# Patient Record
Sex: FEMALE | Race: WHITE | Employment: OTHER | URBAN - METROPOLITAN AREA
[De-identification: names, ages, dates, MRNs, and addresses within clinical notes are randomized per-mention and may not be internally consistent; named-entity substitution may affect disease eponyms.]

---

## 2018-06-01 ENCOUNTER — OFFICE VISIT (OUTPATIENT)
Dept: PAIN MANAGEMENT | Age: 58
End: 2018-06-01
Payer: COMMERCIAL

## 2018-06-01 VITALS
BODY MASS INDEX: 39.4 KG/M2 | OXYGEN SATURATION: 95 % | RESPIRATION RATE: 16 BRPM | WEIGHT: 260 LBS | DIASTOLIC BLOOD PRESSURE: 80 MMHG | HEART RATE: 68 BPM | TEMPERATURE: 98 F | HEIGHT: 68 IN | SYSTOLIC BLOOD PRESSURE: 128 MMHG

## 2018-06-01 DIAGNOSIS — G89.29 CHRONIC LOW BACK PAIN, UNSPECIFIED BACK PAIN LATERALITY, WITH SCIATICA PRESENCE UNSPECIFIED: ICD-10-CM

## 2018-06-01 DIAGNOSIS — M47.816 LUMBAR SPONDYLOSIS: ICD-10-CM

## 2018-06-01 DIAGNOSIS — M50.90 CERVICAL DISC DISORDER: ICD-10-CM

## 2018-06-01 DIAGNOSIS — M48.02 CERVICAL STENOSIS OF SPINE: ICD-10-CM

## 2018-06-01 DIAGNOSIS — M54.5 CHRONIC LOW BACK PAIN, UNSPECIFIED BACK PAIN LATERALITY, WITH SCIATICA PRESENCE UNSPECIFIED: ICD-10-CM

## 2018-06-01 DIAGNOSIS — M17.0 PRIMARY OSTEOARTHRITIS OF BOTH KNEES: ICD-10-CM

## 2018-06-01 DIAGNOSIS — M54.16 LUMBAR RADICULOPATHY: ICD-10-CM

## 2018-06-01 DIAGNOSIS — M47.812 CERVICAL FACET JOINT SYNDROME: ICD-10-CM

## 2018-06-01 DIAGNOSIS — M47.816 LUMBAR FACET ARTHROPATHY: ICD-10-CM

## 2018-06-01 DIAGNOSIS — M54.12 CERVICAL RADICULOPATHY: ICD-10-CM

## 2018-06-01 DIAGNOSIS — M51.9 LUMBAR DISC DISORDER: Primary | ICD-10-CM

## 2018-06-01 DIAGNOSIS — M47.22 OSTEOARTHRITIS OF SPINE WITH RADICULOPATHY, CERVICAL REGION: ICD-10-CM

## 2018-06-01 DIAGNOSIS — M48.061 SPINAL STENOSIS OF LUMBAR REGION WITHOUT NEUROGENIC CLAUDICATION: ICD-10-CM

## 2018-06-01 PROCEDURE — 99204 OFFICE O/P NEW MOD 45 MIN: CPT | Performed by: PAIN MEDICINE

## 2018-06-01 PROCEDURE — 99214 OFFICE O/P EST MOD 30 MIN: CPT | Performed by: PAIN MEDICINE

## 2018-06-01 RX ORDER — AMLODIPINE BESYLATE 5 MG/1
TABLET ORAL
Refills: 5 | COMMUNITY
Start: 2018-03-24 | End: 2018-06-01

## 2018-06-01 RX ORDER — TRAMADOL HYDROCHLORIDE 50 MG/1
TABLET ORAL
Refills: 0 | COMMUNITY
Start: 2018-04-03 | End: 2018-06-01

## 2018-06-01 RX ORDER — DULOXETIN HYDROCHLORIDE 60 MG/1
CAPSULE, DELAYED RELEASE ORAL
COMMUNITY
Start: 2018-05-16 | End: 2018-06-01

## 2018-06-01 RX ORDER — TIZANIDINE 2 MG/1
2 TABLET ORAL DAILY PRN
Qty: 30 TABLET | Refills: 0 | Status: SHIPPED | OUTPATIENT
Start: 2018-06-01 | End: 2018-06-25 | Stop reason: ALTCHOICE

## 2018-06-01 RX ORDER — HYDROCODONE BITARTRATE AND ACETAMINOPHEN 10; 325 MG/1; MG/1
TABLET ORAL
Refills: 0 | COMMUNITY
Start: 2018-03-27 | End: 2018-06-01

## 2018-06-01 RX ORDER — IBUPROFEN 200 MG
1400 TABLET ORAL SEE ADMIN INSTRUCTIONS
Status: ON HOLD | COMMUNITY
End: 2019-04-26 | Stop reason: HOSPADM

## 2018-06-01 RX ORDER — BUPRENORPHINE 5 UG/H
1 PATCH TRANSDERMAL WEEKLY
Qty: 4 PATCH | Refills: 0 | Status: SHIPPED | OUTPATIENT
Start: 2018-06-01 | End: 2018-06-29

## 2018-06-06 ENCOUNTER — TELEPHONE (OUTPATIENT)
Dept: PAIN MANAGEMENT | Age: 58
End: 2018-06-06

## 2018-06-15 ENCOUNTER — TELEPHONE (OUTPATIENT)
Dept: PAIN MANAGEMENT | Age: 58
End: 2018-06-15

## 2018-06-25 ENCOUNTER — APPOINTMENT (OUTPATIENT)
Dept: GENERAL RADIOLOGY | Age: 58
DRG: 607 | End: 2018-06-25
Payer: COMMERCIAL

## 2018-06-25 ENCOUNTER — HOSPITAL ENCOUNTER (INPATIENT)
Age: 58
LOS: 1 days | Discharge: NURSING, REHAB OR LONG-TERM CARE | DRG: 607 | End: 2018-06-26
Attending: EMERGENCY MEDICINE | Admitting: INTERNAL MEDICINE
Payer: COMMERCIAL

## 2018-06-25 DIAGNOSIS — R26.2 INABILITY TO AMBULATE DUE TO MULTIPLE JOINTS: ICD-10-CM

## 2018-06-25 DIAGNOSIS — I50.9 CONGESTIVE HEART FAILURE, UNSPECIFIED CONGESTIVE HEART FAILURE CHRONICITY, UNSPECIFIED CONGESTIVE HEART FAILURE TYPE: ICD-10-CM

## 2018-06-25 DIAGNOSIS — M79.89 LEG SWELLING: Primary | ICD-10-CM

## 2018-06-25 DIAGNOSIS — Z74.1 REQUIRES ASSISTANCE WITH ACTIVITIES OF DAILY LIVING (ADL): ICD-10-CM

## 2018-06-25 PROBLEM — I50.43 CHF (CONGESTIVE HEART FAILURE), NYHA CLASS I, ACUTE ON CHRONIC, COMBINED (HCC): Status: ACTIVE | Noted: 2018-06-25

## 2018-06-25 LAB
ALBUMIN SERPL-MCNC: 3.8 G/DL (ref 3.5–5.2)
ALP BLD-CCNC: 94 U/L (ref 35–104)
ALT SERPL-CCNC: 10 U/L (ref 0–32)
ANION GAP SERPL CALCULATED.3IONS-SCNC: 12 MMOL/L (ref 7–16)
AST SERPL-CCNC: 14 U/L (ref 0–31)
BASOPHILS ABSOLUTE: 0.07 E9/L (ref 0–0.2)
BASOPHILS RELATIVE PERCENT: 1 % (ref 0–2)
BILIRUB SERPL-MCNC: 0.2 MG/DL (ref 0–1.2)
BUN BLDV-MCNC: 20 MG/DL (ref 6–20)
CALCIUM SERPL-MCNC: 8.8 MG/DL (ref 8.6–10.2)
CHLORIDE BLD-SCNC: 100 MMOL/L (ref 98–107)
CO2: 25 MMOL/L (ref 22–29)
CREAT SERPL-MCNC: 0.8 MG/DL (ref 0.5–1)
EOSINOPHILS ABSOLUTE: 0.45 E9/L (ref 0.05–0.5)
EOSINOPHILS RELATIVE PERCENT: 6.3 % (ref 0–6)
GFR AFRICAN AMERICAN: >60
GFR NON-AFRICAN AMERICAN: >60 ML/MIN/1.73
GLUCOSE BLD-MCNC: 133 MG/DL (ref 74–109)
HCT VFR BLD CALC: 35.5 % (ref 34–48)
HEMOGLOBIN: 12 G/DL (ref 11.5–15.5)
IMMATURE GRANULOCYTES #: 0.03 E9/L
IMMATURE GRANULOCYTES %: 0.4 % (ref 0–5)
LYMPHOCYTES ABSOLUTE: 1.82 E9/L (ref 1.5–4)
LYMPHOCYTES RELATIVE PERCENT: 25.5 % (ref 20–42)
MCH RBC QN AUTO: 33.1 PG (ref 26–35)
MCHC RBC AUTO-ENTMCNC: 33.8 % (ref 32–34.5)
MCV RBC AUTO: 98.1 FL (ref 80–99.9)
METER GLUCOSE: 97 MG/DL (ref 70–110)
MONOCYTES ABSOLUTE: 0.35 E9/L (ref 0.1–0.95)
MONOCYTES RELATIVE PERCENT: 4.9 % (ref 2–12)
NEUTROPHILS ABSOLUTE: 4.41 E9/L (ref 1.8–7.3)
NEUTROPHILS RELATIVE PERCENT: 61.9 % (ref 43–80)
PDW BLD-RTO: 14.8 FL (ref 11.5–15)
PLATELET # BLD: 268 E9/L (ref 130–450)
PMV BLD AUTO: 10.3 FL (ref 7–12)
POTASSIUM SERPL-SCNC: 4 MMOL/L (ref 3.5–5)
PRO-BNP: 278 PG/ML (ref 0–125)
RBC # BLD: 3.62 E12/L (ref 3.5–5.5)
SODIUM BLD-SCNC: 137 MMOL/L (ref 132–146)
TOTAL PROTEIN: 6.8 G/DL (ref 6.4–8.3)
TROPONIN: <0.01 NG/ML (ref 0–0.03)
WBC # BLD: 7.1 E9/L (ref 4.5–11.5)

## 2018-06-25 PROCEDURE — 82962 GLUCOSE BLOOD TEST: CPT

## 2018-06-25 PROCEDURE — 83880 ASSAY OF NATRIURETIC PEPTIDE: CPT

## 2018-06-25 PROCEDURE — 71045 X-RAY EXAM CHEST 1 VIEW: CPT

## 2018-06-25 PROCEDURE — 80053 COMPREHEN METABOLIC PANEL: CPT

## 2018-06-25 PROCEDURE — 84484 ASSAY OF TROPONIN QUANT: CPT

## 2018-06-25 PROCEDURE — 94761 N-INVAS EAR/PLS OXIMETRY MLT: CPT

## 2018-06-25 PROCEDURE — 85025 COMPLETE CBC W/AUTO DIFF WBC: CPT

## 2018-06-25 PROCEDURE — 36415 COLL VENOUS BLD VENIPUNCTURE: CPT

## 2018-06-25 PROCEDURE — 6360000002 HC RX W HCPCS: Performed by: EMERGENCY MEDICINE

## 2018-06-25 PROCEDURE — 99285 EMERGENCY DEPT VISIT HI MDM: CPT

## 2018-06-25 PROCEDURE — 6370000000 HC RX 637 (ALT 250 FOR IP): Performed by: EMERGENCY MEDICINE

## 2018-06-25 PROCEDURE — 2140000000 HC CCU INTERMEDIATE R&B

## 2018-06-25 RX ORDER — OXYCODONE HYDROCHLORIDE AND ACETAMINOPHEN 5; 325 MG/1; MG/1
1 TABLET ORAL ONCE
Status: COMPLETED | OUTPATIENT
Start: 2018-06-25 | End: 2018-06-25

## 2018-06-25 RX ORDER — FUROSEMIDE 10 MG/ML
40 INJECTION INTRAMUSCULAR; INTRAVENOUS ONCE
Status: COMPLETED | OUTPATIENT
Start: 2018-06-25 | End: 2018-06-25

## 2018-06-25 RX ORDER — FUROSEMIDE 10 MG/ML
20 INJECTION INTRAMUSCULAR; INTRAVENOUS 2 TIMES DAILY
Status: DISCONTINUED | OUTPATIENT
Start: 2018-06-26 | End: 2018-06-26 | Stop reason: HOSPADM

## 2018-06-25 RX ORDER — DEXTROSE MONOHYDRATE 25 G/50ML
12.5 INJECTION, SOLUTION INTRAVENOUS PRN
Status: DISCONTINUED | OUTPATIENT
Start: 2018-06-25 | End: 2018-06-26 | Stop reason: HOSPADM

## 2018-06-25 RX ORDER — DULOXETIN HYDROCHLORIDE 60 MG/1
60 CAPSULE, DELAYED RELEASE ORAL 2 TIMES DAILY
Status: DISCONTINUED | OUTPATIENT
Start: 2018-06-25 | End: 2018-06-26 | Stop reason: HOSPADM

## 2018-06-25 RX ORDER — AMLODIPINE BESYLATE 5 MG/1
5 TABLET ORAL DAILY
Status: ON HOLD | COMMUNITY
End: 2018-09-01 | Stop reason: HOSPADM

## 2018-06-25 RX ORDER — DEXTROSE MONOHYDRATE 50 MG/ML
100 INJECTION, SOLUTION INTRAVENOUS PRN
Status: DISCONTINUED | OUTPATIENT
Start: 2018-06-25 | End: 2018-06-26 | Stop reason: HOSPADM

## 2018-06-25 RX ORDER — METOPROLOL TARTRATE AND HYDROCHLOROTHIAZIDE 50; 25 MG/1; MG/1
1 TABLET ORAL DAILY
Status: DISCONTINUED | OUTPATIENT
Start: 2018-06-26 | End: 2018-06-25 | Stop reason: CLARIF

## 2018-06-25 RX ORDER — OXYCODONE HYDROCHLORIDE AND ACETAMINOPHEN 5; 325 MG/1; MG/1
1 TABLET ORAL EVERY 4 HOURS PRN
Status: DISCONTINUED | OUTPATIENT
Start: 2018-06-25 | End: 2018-06-26 | Stop reason: SDUPTHER

## 2018-06-25 RX ORDER — HYDROCHLOROTHIAZIDE 25 MG/1
25 TABLET ORAL DAILY
Status: DISCONTINUED | OUTPATIENT
Start: 2018-06-26 | End: 2018-06-26 | Stop reason: HOSPADM

## 2018-06-25 RX ORDER — AMLODIPINE BESYLATE 5 MG/1
5 TABLET ORAL DAILY
Status: DISCONTINUED | OUTPATIENT
Start: 2018-06-26 | End: 2018-06-26 | Stop reason: HOSPADM

## 2018-06-25 RX ORDER — METOPROLOL TARTRATE 50 MG/1
50 TABLET, FILM COATED ORAL DAILY
Status: DISCONTINUED | OUTPATIENT
Start: 2018-06-26 | End: 2018-06-26 | Stop reason: HOSPADM

## 2018-06-25 RX ORDER — GABAPENTIN 400 MG/1
400 CAPSULE ORAL 4 TIMES DAILY
Status: DISCONTINUED | OUTPATIENT
Start: 2018-06-25 | End: 2018-06-26 | Stop reason: HOSPADM

## 2018-06-25 RX ORDER — NICOTINE POLACRILEX 4 MG
15 LOZENGE BUCCAL PRN
Status: DISCONTINUED | OUTPATIENT
Start: 2018-06-25 | End: 2018-06-26 | Stop reason: HOSPADM

## 2018-06-25 RX ORDER — INSULIN GLARGINE 100 [IU]/ML
78 INJECTION, SOLUTION SUBCUTANEOUS NIGHTLY
Status: DISCONTINUED | OUTPATIENT
Start: 2018-06-25 | End: 2018-06-26 | Stop reason: HOSPADM

## 2018-06-25 RX ORDER — BUPRENORPHINE 5 UG/H
1 PATCH TRANSDERMAL WEEKLY
Status: DISCONTINUED | OUTPATIENT
Start: 2018-06-25 | End: 2018-06-26 | Stop reason: HOSPADM

## 2018-06-25 RX ORDER — LISINOPRIL 20 MG/1
20 TABLET ORAL DAILY
Status: DISCONTINUED | OUTPATIENT
Start: 2018-06-26 | End: 2018-06-26 | Stop reason: HOSPADM

## 2018-06-25 RX ADMIN — FUROSEMIDE 40 MG: 10 INJECTION, SOLUTION INTRAMUSCULAR; INTRAVENOUS at 20:40

## 2018-06-25 RX ADMIN — OXYCODONE HYDROCHLORIDE AND ACETAMINOPHEN 1 TABLET: 5; 325 TABLET ORAL at 18:16

## 2018-06-25 ASSESSMENT — PAIN DESCRIPTION - PAIN TYPE
TYPE: ACUTE PAIN;CHRONIC PAIN
TYPE: CHRONIC PAIN

## 2018-06-25 ASSESSMENT — PAIN DESCRIPTION - DESCRIPTORS
DESCRIPTORS: ACHING;CONSTANT;NAGGING
DESCRIPTORS: ACHING

## 2018-06-25 ASSESSMENT — PAIN DESCRIPTION - LOCATION: LOCATION: BACK;FOOT;LEG

## 2018-06-25 ASSESSMENT — PAIN DESCRIPTION - ORIENTATION: ORIENTATION: LOWER;MID

## 2018-06-25 ASSESSMENT — PAIN SCALES - GENERAL
PAINLEVEL_OUTOF10: 9
PAINLEVEL_OUTOF10: 8

## 2018-06-25 ASSESSMENT — PAIN DESCRIPTION - FREQUENCY
FREQUENCY: CONTINUOUS
FREQUENCY: CONTINUOUS

## 2018-06-26 ENCOUNTER — APPOINTMENT (OUTPATIENT)
Dept: GENERAL RADIOLOGY | Age: 58
DRG: 607 | End: 2018-06-26
Payer: COMMERCIAL

## 2018-06-26 VITALS
HEART RATE: 62 BPM | DIASTOLIC BLOOD PRESSURE: 62 MMHG | WEIGHT: 280 LBS | RESPIRATION RATE: 18 BRPM | SYSTOLIC BLOOD PRESSURE: 126 MMHG | OXYGEN SATURATION: 94 % | HEIGHT: 68 IN | TEMPERATURE: 97.8 F | BODY MASS INDEX: 42.44 KG/M2

## 2018-06-26 PROBLEM — I10 ESSENTIAL HYPERTENSION: Chronic | Status: ACTIVE | Noted: 2018-06-26

## 2018-06-26 PROBLEM — M47.816 LUMBAR FACET ARTHROPATHY: Status: RESOLVED | Noted: 2018-06-01 | Resolved: 2018-06-26

## 2018-06-26 PROBLEM — M50.90 CERVICAL DISC DISORDER: Status: RESOLVED | Noted: 2018-06-01 | Resolved: 2018-06-26

## 2018-06-26 PROBLEM — M47.812 CERVICAL FACET JOINT SYNDROME: Status: RESOLVED | Noted: 2018-06-01 | Resolved: 2018-06-26

## 2018-06-26 PROBLEM — Z86.718 HISTORY OF DVT (DEEP VEIN THROMBOSIS): Chronic | Status: ACTIVE | Noted: 2018-06-26

## 2018-06-26 PROBLEM — M48.061 SPINAL STENOSIS OF LUMBAR REGION WITHOUT NEUROGENIC CLAUDICATION: Status: RESOLVED | Noted: 2018-06-01 | Resolved: 2018-06-26

## 2018-06-26 PROBLEM — M54.9 CHRONIC BACK PAIN: Chronic | Status: ACTIVE | Noted: 2018-06-26

## 2018-06-26 PROBLEM — G47.33 OSA (OBSTRUCTIVE SLEEP APNEA): Chronic | Status: ACTIVE | Noted: 2018-06-26

## 2018-06-26 PROBLEM — M47.22 OSTEOARTHRITIS OF SPINE WITH RADICULOPATHY, CERVICAL REGION: Status: RESOLVED | Noted: 2018-06-01 | Resolved: 2018-06-26

## 2018-06-26 PROBLEM — E66.01 MORBID OBESITY WITH BMI OF 40.0-44.9, ADULT (HCC): Chronic | Status: ACTIVE | Noted: 2018-06-26

## 2018-06-26 PROBLEM — M17.0 PRIMARY OSTEOARTHRITIS OF BOTH KNEES: Status: RESOLVED | Noted: 2018-06-01 | Resolved: 2018-06-26

## 2018-06-26 PROBLEM — I50.43 CHF (CONGESTIVE HEART FAILURE), NYHA CLASS I, ACUTE ON CHRONIC, COMBINED (HCC): Status: RESOLVED | Noted: 2018-06-25 | Resolved: 2018-06-26

## 2018-06-26 PROBLEM — M54.12 CERVICAL RADICULOPATHY: Status: RESOLVED | Noted: 2018-06-01 | Resolved: 2018-06-26

## 2018-06-26 PROBLEM — M47.816 LUMBAR SPONDYLOSIS: Status: RESOLVED | Noted: 2018-06-01 | Resolved: 2018-06-26

## 2018-06-26 PROBLEM — M48.02 CERVICAL STENOSIS OF SPINE: Status: RESOLVED | Noted: 2018-06-01 | Resolved: 2018-06-26

## 2018-06-26 PROBLEM — G89.29 CHRONIC BACK PAIN: Chronic | Status: ACTIVE | Noted: 2018-06-26

## 2018-06-26 PROBLEM — M51.9 LUMBAR DISC DISORDER: Status: RESOLVED | Noted: 2018-06-01 | Resolved: 2018-06-26

## 2018-06-26 LAB
ANION GAP SERPL CALCULATED.3IONS-SCNC: 13 MMOL/L (ref 7–16)
BUN BLDV-MCNC: 20 MG/DL (ref 6–20)
CALCIUM SERPL-MCNC: 8.9 MG/DL (ref 8.6–10.2)
CHLORIDE BLD-SCNC: 103 MMOL/L (ref 98–107)
CO2: 28 MMOL/L (ref 22–29)
CREAT SERPL-MCNC: 0.9 MG/DL (ref 0.5–1)
GFR AFRICAN AMERICAN: >60
GFR NON-AFRICAN AMERICAN: >60 ML/MIN/1.73
GLUCOSE BLD-MCNC: 104 MG/DL (ref 74–109)
METER GLUCOSE: 118 MG/DL (ref 70–110)
METER GLUCOSE: 138 MG/DL (ref 70–110)
METER GLUCOSE: 169 MG/DL (ref 70–110)
POTASSIUM SERPL-SCNC: 3.6 MMOL/L (ref 3.5–5)
SODIUM BLD-SCNC: 144 MMOL/L (ref 132–146)

## 2018-06-26 PROCEDURE — 6370000000 HC RX 637 (ALT 250 FOR IP): Performed by: INTERNAL MEDICINE

## 2018-06-26 PROCEDURE — 97530 THERAPEUTIC ACTIVITIES: CPT

## 2018-06-26 PROCEDURE — 82962 GLUCOSE BLOOD TEST: CPT

## 2018-06-26 PROCEDURE — 71046 X-RAY EXAM CHEST 2 VIEWS: CPT

## 2018-06-26 PROCEDURE — G8978 MOBILITY CURRENT STATUS: HCPCS

## 2018-06-26 PROCEDURE — 36415 COLL VENOUS BLD VENIPUNCTURE: CPT

## 2018-06-26 PROCEDURE — 97165 OT EVAL LOW COMPLEX 30 MIN: CPT

## 2018-06-26 PROCEDURE — 6360000002 HC RX W HCPCS: Performed by: INTERNAL MEDICINE

## 2018-06-26 PROCEDURE — 97162 PT EVAL MOD COMPLEX 30 MIN: CPT

## 2018-06-26 PROCEDURE — G8988 SELF CARE GOAL STATUS: HCPCS

## 2018-06-26 PROCEDURE — G8979 MOBILITY GOAL STATUS: HCPCS

## 2018-06-26 PROCEDURE — G8987 SELF CARE CURRENT STATUS: HCPCS

## 2018-06-26 PROCEDURE — 94640 AIRWAY INHALATION TREATMENT: CPT

## 2018-06-26 PROCEDURE — 80048 BASIC METABOLIC PNL TOTAL CA: CPT

## 2018-06-26 RX ORDER — OXYCODONE HYDROCHLORIDE AND ACETAMINOPHEN 5; 325 MG/1; MG/1
1 TABLET ORAL EVERY 4 HOURS PRN
Status: DISCONTINUED | OUTPATIENT
Start: 2018-06-26 | End: 2018-06-26 | Stop reason: HOSPADM

## 2018-06-26 RX ORDER — OXYCODONE HYDROCHLORIDE AND ACETAMINOPHEN 5; 325 MG/1; MG/1
2 TABLET ORAL EVERY 4 HOURS PRN
Status: DISCONTINUED | OUTPATIENT
Start: 2018-06-26 | End: 2018-06-26 | Stop reason: HOSPADM

## 2018-06-26 RX ORDER — IPRATROPIUM BROMIDE AND ALBUTEROL SULFATE 2.5; .5 MG/3ML; MG/3ML
1 SOLUTION RESPIRATORY (INHALATION)
Status: DISCONTINUED | OUTPATIENT
Start: 2018-06-26 | End: 2018-06-26 | Stop reason: HOSPADM

## 2018-06-26 RX ORDER — ALBUTEROL SULFATE 1.25 MG/3ML
1.25 SOLUTION RESPIRATORY (INHALATION) EVERY 6 HOURS PRN
Status: DISCONTINUED | OUTPATIENT
Start: 2018-06-26 | End: 2018-06-26 | Stop reason: HOSPADM

## 2018-06-26 RX ORDER — PETROLATUM 42 G/100G
OINTMENT TOPICAL NIGHTLY
Status: DISCONTINUED | OUTPATIENT
Start: 2018-06-26 | End: 2018-06-26 | Stop reason: HOSPADM

## 2018-06-26 RX ORDER — PETROLATUM 42 G/100G
OINTMENT TOPICAL 2 TIMES DAILY
Status: DISCONTINUED | OUTPATIENT
Start: 2018-06-26 | End: 2018-06-26 | Stop reason: HOSPADM

## 2018-06-26 RX ORDER — PETROLATUM 42 G/100G
OINTMENT TOPICAL 3 TIMES DAILY PRN
Status: DISCONTINUED | OUTPATIENT
Start: 2018-06-26 | End: 2018-06-26 | Stop reason: HOSPADM

## 2018-06-26 RX ADMIN — OXYCODONE HYDROCHLORIDE AND ACETAMINOPHEN 2 TABLET: 5; 325 TABLET ORAL at 16:59

## 2018-06-26 RX ADMIN — GABAPENTIN 400 MG: 400 CAPSULE ORAL at 13:09

## 2018-06-26 RX ADMIN — LISINOPRIL 20 MG: 20 TABLET ORAL at 08:57

## 2018-06-26 RX ADMIN — LINAGLIPTIN 5 MG: 5 TABLET, FILM COATED ORAL at 08:57

## 2018-06-26 RX ADMIN — GABAPENTIN 400 MG: 400 CAPSULE ORAL at 17:00

## 2018-06-26 RX ADMIN — METOPROLOL TARTRATE 50 MG: 50 TABLET, FILM COATED ORAL at 08:57

## 2018-06-26 RX ADMIN — IPRATROPIUM BROMIDE AND ALBUTEROL SULFATE 1 AMPULE: 2.5; .5 SOLUTION RESPIRATORY (INHALATION) at 16:05

## 2018-06-26 RX ADMIN — OXYCODONE HYDROCHLORIDE AND ACETAMINOPHEN 2 TABLET: 5; 325 TABLET ORAL at 13:09

## 2018-06-26 RX ADMIN — METFORMIN HYDROCHLORIDE 1000 MG: 1000 TABLET ORAL at 08:57

## 2018-06-26 RX ADMIN — OXYCODONE HYDROCHLORIDE AND ACETAMINOPHEN 1 TABLET: 5; 325 TABLET ORAL at 08:57

## 2018-06-26 RX ADMIN — FUROSEMIDE 20 MG: 10 INJECTION, SOLUTION INTRAVENOUS at 08:57

## 2018-06-26 RX ADMIN — METFORMIN HYDROCHLORIDE 1000 MG: 1000 TABLET ORAL at 17:00

## 2018-06-26 RX ADMIN — GABAPENTIN 400 MG: 400 CAPSULE ORAL at 08:57

## 2018-06-26 RX ADMIN — DULOXETINE HYDROCHLORIDE 60 MG: 60 CAPSULE, DELAYED RELEASE ORAL at 08:57

## 2018-06-26 RX ADMIN — OXYCODONE HYDROCHLORIDE AND ACETAMINOPHEN 1 TABLET: 5; 325 TABLET ORAL at 04:23

## 2018-06-26 RX ADMIN — AMLODIPINE BESYLATE 5 MG: 5 TABLET ORAL at 08:57

## 2018-06-26 RX ADMIN — DULOXETINE HYDROCHLORIDE 60 MG: 60 CAPSULE, DELAYED RELEASE ORAL at 00:14

## 2018-06-26 RX ADMIN — HYDROCHLOROTHIAZIDE 25 MG: 25 TABLET ORAL at 08:57

## 2018-06-26 RX ADMIN — GABAPENTIN 400 MG: 400 CAPSULE ORAL at 00:14

## 2018-06-26 ASSESSMENT — PAIN DESCRIPTION - DESCRIPTORS: DESCRIPTORS: ACHING;DISCOMFORT;SORE;NAGGING

## 2018-06-26 ASSESSMENT — PAIN DESCRIPTION - LOCATION
LOCATION: GENERALIZED
LOCATION: GENERALIZED

## 2018-06-26 ASSESSMENT — PAIN SCALES - GENERAL
PAINLEVEL_OUTOF10: 9
PAINLEVEL_OUTOF10: 10
PAINLEVEL_OUTOF10: 3
PAINLEVEL_OUTOF10: 6

## 2018-06-26 ASSESSMENT — PAIN DESCRIPTION - FREQUENCY
FREQUENCY: CONTINUOUS
FREQUENCY: CONTINUOUS

## 2018-06-26 ASSESSMENT — PAIN DESCRIPTION - PAIN TYPE: TYPE: CHRONIC PAIN;ACUTE PAIN

## 2018-06-29 LAB
EKG ATRIAL RATE: 59 BPM
EKG P AXIS: -12 DEGREES
EKG P-R INTERVAL: 172 MS
EKG Q-T INTERVAL: 426 MS
EKG QRS DURATION: 68 MS
EKG QTC CALCULATION (BAZETT): 421 MS
EKG R AXIS: 20 DEGREES
EKG T AXIS: 14 DEGREES
EKG VENTRICULAR RATE: 59 BPM

## 2018-07-19 ENCOUNTER — OFFICE VISIT (OUTPATIENT)
Dept: PAIN MANAGEMENT | Age: 58
End: 2018-07-19
Payer: COMMERCIAL

## 2018-07-19 VITALS
HEART RATE: 73 BPM | RESPIRATION RATE: 16 BRPM | TEMPERATURE: 98.2 F | OXYGEN SATURATION: 97 % | DIASTOLIC BLOOD PRESSURE: 70 MMHG | SYSTOLIC BLOOD PRESSURE: 122 MMHG

## 2018-07-19 DIAGNOSIS — G89.29 CHRONIC LOW BACK PAIN, UNSPECIFIED BACK PAIN LATERALITY, WITH SCIATICA PRESENCE UNSPECIFIED: Primary | Chronic | ICD-10-CM

## 2018-07-19 DIAGNOSIS — M54.5 CHRONIC LOW BACK PAIN, UNSPECIFIED BACK PAIN LATERALITY, WITH SCIATICA PRESENCE UNSPECIFIED: Primary | Chronic | ICD-10-CM

## 2018-07-19 PROCEDURE — 99213 OFFICE O/P EST LOW 20 MIN: CPT | Performed by: NURSE PRACTITIONER

## 2018-07-19 RX ORDER — TEMAZEPAM 15 MG/1
CAPSULE ORAL
Refills: 0 | COMMUNITY
Start: 2018-07-16 | End: 2019-02-05 | Stop reason: ALTCHOICE

## 2018-07-19 RX ORDER — BUPRENORPHINE 7.5 UG/H
1 PATCH TRANSDERMAL WEEKLY
Qty: 4 PATCH | Refills: 0 | Status: SHIPPED | OUTPATIENT
Start: 2018-07-19 | End: 2018-08-16

## 2018-07-19 RX ORDER — FUROSEMIDE 40 MG/1
40 TABLET ORAL DAILY
Status: ON HOLD | COMMUNITY
End: 2019-04-26 | Stop reason: HOSPADM

## 2018-07-19 RX ORDER — HYDROCODONE BITARTRATE AND ACETAMINOPHEN 10; 325 MG/1; MG/1
1 TABLET ORAL EVERY 6 HOURS PRN
COMMUNITY
End: 2018-08-16

## 2018-07-19 NOTE — PROGRESS NOTES
Patient lives at home alone. She reports use of a wheelchair. Family History   Problem Relation Age of Onset    Heart Surgery Mother     Breast Cancer Sister     Diabetes Brother        REVIEW OF SYSTEMS:     Michelle Duncan denies fever/chills, chest pain, shortness of breath, new bowel or bladder complaints. All other review of systems was negative. Denies any new neurologic  Complaints and/or red flag symptoms. PHYSICAL EXAMINATION:      /70   Pulse 73   Temp 98.2 °F (36.8 °C) (Oral)   Resp 16   LMP 10/17/2012   SpO2 97%      General:  Chronically ill appearing female, sitting in wheelchair     HEENT: Head is normocephalic, atraumatic. Eyes: Extraocular muscles are intact. Cranial nerves II-XII grossly intact. Hearing is grossly intact. Oral mucosal membranes are moist.      GAIT/GROSS MOTOR STATIONS:  The patient ambulates slowly with the assistance of a Walker. Did not ambulate. PERIPHERAL VASCULAR: Severe +3 edema noted in the bilateral lower extremities. ABDOMEN:  Soft, nontender, obese. PALPATION/SPINE INSPECTION:  positive paraspinal muscle tenderness lumbar region. Spine inspection: old incisional scar No CVA tenderness. Negative piriformis/SIJ tenderness. Straight leg test: neg. sitting    RANGE OF MOTION:   Lumbar Spine: Limited in all planes.  Sensory:diminished to light touch bilateral legs and Right C7 dermatome  Motor:   Right Grip3/5              Left Grip3/5               Right Bicep4+/5           Left Bicep4+/5              Right Triceps4+/5       Left Triceps4+/5          Right Deltoid4+/5     Left Deltoid4+/5          Atrophy of webspace between Left thumb and index  Left dorsal interossi 3/5          Right Quadriceps3/5          Left Quadriceps3/5           Right Gastrocnemius3/5    Left Gastrocnemius3/5  Right Ant Tibialis3/5  Left Ant Tibialis3/5  Reflexes:    Right Brachioradialis reflex2+  Left Brachioradialis reflex2+  Right Biceps reflex2+  Left Biceps reflex2+  Right Triceps reflex1+  Left Triceps reflex2+  Right Quadriceps reflex0  Left Quadriceps reflex0  Right Achilles reflex0    Left Achilles reflex0    PROVOCATIVE MANEUVERS:   Facet Loading:   Unable to perform due to body habitus    INTEGUMENT: Appearance of exposed skin is unremarkable. No rashes or lesions noted.     Assessment/Plan:    Patient seen for follow up for her Chronic neck and low back pain with radiation to both upper extremities  Patient had seen Jennifer Ely and Dr. Megan Watson in the past  Patient had L5-S1 fusion and currently has hardware failure, she had also seen Zan Harrison at [facility] who recommended neck surgery but it is on hold because of her comorbid diabetes which is poorly controlled   Good candidate for interventional procedures but will hold off until her blood sugars stabilize and her chronic wounds heal due to high risk of infection  Continue Gabapentin 400 mg QID   Continue Cymbalta 60 mg BID  Continue with Butrans Patch but will increase to 7.5 Q week, will avoid short acting opioids because of being high risk  Continue Zanaflex 2 mg QD  Urine screen initial inconsistent negative for all   OARRS report reviewed  Discussed following up with psychiatry, information given due to the recent loss of her son  Patient encouraged to stay active as tolerated  Treatment plan discussed with the patient including medications and side effects   Monitor closely    ccreferring physic    Vikas Rodriguez, JENNY - CNP

## 2018-07-20 ENCOUNTER — HOSPITAL ENCOUNTER (EMERGENCY)
Age: 58
Discharge: HOME | End: 2018-07-20
Attending: EMERGENCY MEDICINE
Payer: COMMERCIAL

## 2018-07-20 ENCOUNTER — APPOINTMENT (OUTPATIENT)
Dept: GENERAL RADIOLOGY | Age: 58
End: 2018-07-20
Payer: COMMERCIAL

## 2018-07-20 VITALS
WEIGHT: 250 LBS | RESPIRATION RATE: 17 BRPM | OXYGEN SATURATION: 100 % | HEIGHT: 69 IN | TEMPERATURE: 98.6 F | HEART RATE: 68 BPM | SYSTOLIC BLOOD PRESSURE: 134 MMHG | DIASTOLIC BLOOD PRESSURE: 67 MMHG | BODY MASS INDEX: 37.03 KG/M2

## 2018-07-20 DIAGNOSIS — I89.0 LYMPHEDEMA: Primary | ICD-10-CM

## 2018-07-20 DIAGNOSIS — M79.89 LEG SWELLING: ICD-10-CM

## 2018-07-20 LAB
ANION GAP SERPL CALCULATED.3IONS-SCNC: 13 MMOL/L (ref 7–16)
BASOPHILS ABSOLUTE: 0.06 E9/L (ref 0–0.2)
BASOPHILS RELATIVE PERCENT: 1 % (ref 0–2)
BUN BLDV-MCNC: 32 MG/DL (ref 6–20)
CALCIUM SERPL-MCNC: 8.8 MG/DL (ref 8.6–10.2)
CHLORIDE BLD-SCNC: 101 MMOL/L (ref 98–107)
CO2: 25 MMOL/L (ref 22–29)
CREAT SERPL-MCNC: 1.3 MG/DL (ref 0.5–1)
EKG ATRIAL RATE: 62 BPM
EKG P AXIS: 76 DEGREES
EKG P-R INTERVAL: 170 MS
EKG Q-T INTERVAL: 438 MS
EKG QRS DURATION: 74 MS
EKG QTC CALCULATION (BAZETT): 444 MS
EKG R AXIS: 48 DEGREES
EKG T AXIS: 61 DEGREES
EKG VENTRICULAR RATE: 62 BPM
EOSINOPHILS ABSOLUTE: 0.49 E9/L (ref 0.05–0.5)
EOSINOPHILS RELATIVE PERCENT: 7.9 % (ref 0–6)
GFR AFRICAN AMERICAN: 51
GFR NON-AFRICAN AMERICAN: 42 ML/MIN/1.73
GLUCOSE BLD-MCNC: 233 MG/DL (ref 74–109)
HCT VFR BLD CALC: 35.5 % (ref 34–48)
HEMOGLOBIN: 12 G/DL (ref 11.5–15.5)
IMMATURE GRANULOCYTES #: 0.01 E9/L
IMMATURE GRANULOCYTES %: 0.2 % (ref 0–5)
LYMPHOCYTES ABSOLUTE: 1.93 E9/L (ref 1.5–4)
LYMPHOCYTES RELATIVE PERCENT: 31.2 % (ref 20–42)
MCH RBC QN AUTO: 32.2 PG (ref 26–35)
MCHC RBC AUTO-ENTMCNC: 33.8 % (ref 32–34.5)
MCV RBC AUTO: 95.2 FL (ref 80–99.9)
MONOCYTES ABSOLUTE: 0.41 E9/L (ref 0.1–0.95)
MONOCYTES RELATIVE PERCENT: 6.6 % (ref 2–12)
NEUTROPHILS ABSOLUTE: 3.28 E9/L (ref 1.8–7.3)
NEUTROPHILS RELATIVE PERCENT: 53.1 % (ref 43–80)
PDW BLD-RTO: 13.8 FL (ref 11.5–15)
PLATELET # BLD: 227 E9/L (ref 130–450)
PMV BLD AUTO: 10.9 FL (ref 7–12)
POTASSIUM SERPL-SCNC: 4.3 MMOL/L (ref 3.5–5)
PRO-BNP: 235 PG/ML (ref 0–125)
RBC # BLD: 3.73 E12/L (ref 3.5–5.5)
SODIUM BLD-SCNC: 139 MMOL/L (ref 132–146)
TROPONIN: <0.01 NG/ML (ref 0–0.03)
WBC # BLD: 6.2 E9/L (ref 4.5–11.5)

## 2018-07-20 PROCEDURE — 80048 BASIC METABOLIC PNL TOTAL CA: CPT

## 2018-07-20 PROCEDURE — 71045 X-RAY EXAM CHEST 1 VIEW: CPT

## 2018-07-20 PROCEDURE — 83880 ASSAY OF NATRIURETIC PEPTIDE: CPT

## 2018-07-20 PROCEDURE — 84484 ASSAY OF TROPONIN QUANT: CPT

## 2018-07-20 PROCEDURE — 36415 COLL VENOUS BLD VENIPUNCTURE: CPT

## 2018-07-20 PROCEDURE — 6370000000 HC RX 637 (ALT 250 FOR IP): Performed by: PREVENTIVE MEDICINE

## 2018-07-20 PROCEDURE — 85025 COMPLETE CBC W/AUTO DIFF WBC: CPT

## 2018-07-20 PROCEDURE — 99284 EMERGENCY DEPT VISIT MOD MDM: CPT

## 2018-07-20 PROCEDURE — 93005 ELECTROCARDIOGRAM TRACING: CPT | Performed by: PREVENTIVE MEDICINE

## 2018-07-20 RX ORDER — OXYCODONE HYDROCHLORIDE AND ACETAMINOPHEN 5; 325 MG/1; MG/1
1 TABLET ORAL ONCE
Status: COMPLETED | OUTPATIENT
Start: 2018-07-20 | End: 2018-07-20

## 2018-07-20 RX ORDER — MORPHINE SULFATE 4 MG/ML
4 INJECTION, SOLUTION INTRAMUSCULAR; INTRAVENOUS ONCE
Status: DISCONTINUED | OUTPATIENT
Start: 2018-07-20 | End: 2018-07-20

## 2018-07-20 RX ORDER — SODIUM CHLORIDE 0.9 % (FLUSH) 0.9 %
10 SYRINGE (ML) INJECTION PRN
Status: DISCONTINUED | OUTPATIENT
Start: 2018-07-20 | End: 2018-07-20 | Stop reason: HOSPADM

## 2018-07-20 RX ADMIN — OXYCODONE HYDROCHLORIDE AND ACETAMINOPHEN 1 TABLET: 5; 325 TABLET ORAL at 19:08

## 2018-07-20 ASSESSMENT — ENCOUNTER SYMPTOMS
SHORTNESS OF BREATH: 1
CHEST TIGHTNESS: 0
ABDOMINAL PAIN: 0
DIARRHEA: 0
ALLERGIC/IMMUNOLOGIC NEGATIVE: 1
NAUSEA: 0
COUGH: 0
VOMITING: 0
CONSTIPATION: 0

## 2018-07-20 ASSESSMENT — PAIN DESCRIPTION - ORIENTATION
ORIENTATION: RIGHT;LEFT
ORIENTATION_2: LOWER

## 2018-07-20 ASSESSMENT — PAIN DESCRIPTION - DURATION: DURATION_2: CONTINUOUS

## 2018-07-20 ASSESSMENT — PAIN DESCRIPTION - DESCRIPTORS
DESCRIPTORS_2: ACHING
DESCRIPTORS: THROBBING

## 2018-07-20 ASSESSMENT — PAIN DESCRIPTION - ONSET
ONSET_2: ON-GOING
ONSET: PROGRESSIVE

## 2018-07-20 ASSESSMENT — PAIN DESCRIPTION - PROGRESSION
CLINICAL_PROGRESSION: GRADUALLY WORSENING
CLINICAL_PROGRESSION_2: NOT CHANGED

## 2018-07-20 ASSESSMENT — PAIN DESCRIPTION - FREQUENCY: FREQUENCY: CONTINUOUS

## 2018-07-20 ASSESSMENT — PAIN DESCRIPTION - PAIN TYPE
TYPE: CHRONIC PAIN
TYPE_2: CHRONIC PAIN

## 2018-07-20 ASSESSMENT — PAIN DESCRIPTION - LOCATION
LOCATION: FOOT
LOCATION_2: BACK

## 2018-07-20 ASSESSMENT — PAIN DESCRIPTION - INTENSITY: RATING_2: 10

## 2018-07-20 ASSESSMENT — PAIN SCALES - GENERAL
PAINLEVEL_OUTOF10: 9
PAINLEVEL_OUTOF10: 8

## 2018-07-20 NOTE — ED PROVIDER NOTES
This is a 72-year-old white female with past history of hypertension, hyperlipidemia, insulin-dependent diabetes, COPD, CHF and chronic lower extremity lymphedema presents the emergency department with one-month history of increasing bilateral lower extremity swelling as well as shortness of breath when she lays flat at night. She reports no associated chest pain or pressure at this time. She is mostly nonambulatory at this time and has home PT for her chronic lymphedema however today her home health nurse recommended she seek out medical care for her shortness of breath and worsening bilateral leg swelling. She is formerly a patient of Dr. Prabhakar Dai and is requesting a new physician at this time. Review of Systems   Constitutional: Negative for chills and fever. HENT: Negative for congestion. Eyes: Negative for visual disturbance. Respiratory: Positive for shortness of breath. Negative for cough and chest tightness. Cardiovascular: Positive for leg swelling. Negative for chest pain and palpitations. Gastrointestinal: Negative for abdominal pain, constipation, diarrhea, nausea and vomiting. Endocrine: Negative. Genitourinary: Negative for dysuria, frequency, hematuria and urgency. Musculoskeletal: Negative for arthralgias. Skin: Negative. Allergic/Immunologic: Negative. Neurological: Negative for dizziness, weakness and headaches. Hematological: Negative. Psychiatric/Behavioral: Negative. Physical Exam   Constitutional: She is oriented to person, place, and time. She appears well-developed and well-nourished. No distress. HENT:   Head: Normocephalic and atraumatic. Right Ear: External ear normal.   Left Ear: External ear normal.   Nose: Nose normal.   Mouth/Throat: Oropharynx is clear and moist. No oropharyngeal exudate. Eyes: Conjunctivae and EOM are normal. Pupils are equal, round, and reactive to light. Right eye exhibits no discharge.  Left eye exhibits no discharge. Neck: Normal range of motion. Neck supple. No JVD present. No tracheal deviation present. No thyromegaly present. Cardiovascular: Normal rate, regular rhythm, normal heart sounds and intact distal pulses. Exam reveals no gallop and no friction rub. No murmur heard. Pulmonary/Chest: Effort normal and breath sounds normal. No respiratory distress. She has no wheezes. She has no rales. Lungs sounds diminished secondary to body habitus   Abdominal: Soft. Bowel sounds are normal. She exhibits no distension. There is no tenderness. There is no rebound and no guarding. Musculoskeletal: Normal range of motion. She exhibits edema. There is bilateral lower extremity pitting edema 4+ up to the level of the knees. Lymphadenopathy:     She has no cervical adenopathy. Neurological: She is alert and oriented to person, place, and time. Skin: Skin is warm and dry. She is not diaphoretic. Psychiatric: She has a normal mood and affect. Her behavior is normal. Judgment and thought content normal.   Vitals reviewed. Procedures    MDM  Number of Diagnoses or Management Options  Leg swelling:   Lymphedema:   Diagnosis management comments: 53-year-old female with worsening bilateral lower extremity swelling as well as nocturnal orthopnea. We will obtain appropriate laboratory studies as well as EKG and chest x-ray at this time. 1815: Repeat examination demonstrates the patient is not in acute congestive heart failure. Symptoms are likely secondary to poorly-controlled lymphedema. Recommended heart healthy diet with low salt, follow-up with her primary care physician. Plan is for discharge at this time. I have discussed the results of the workup as well as the plan with the patient and family who indicate understanding and agreement with the plan. All questions answered at this time.                --------------------------------------------- PAST HISTORY ---------------------------------------------  Past Medical History:  has a past medical history of Arthritis; Back pain; Bladder infection; Blood transfusion; Cervical facet syndrome; Chest pain; CHF (congestive heart failure), NYHA class I, acute on chronic, combined ([facility] COPD (chronic obstructive pulmonary disease) ([facility] Degenerative disc disease, cervical; Degenerative Osteoarthritis of cervical spine; Degenerative Osteoarthritis of lumbar spine; Diabetes mellitus, insulin dependent (IDDM), uncontrolled ([facility] Diabetic Peripheral neuropathy; Facet syndrome, lumbar; FHx: migraine headaches; Fibrocystic breast; Generalized headaches; HIGH CHOLESTEROL; Hypertension; Irritable bowel; Kidney disease; Lumbago; Lumbar radiculopathy Bilateral; Migraines; Muscle weakness; Postlaminectomy syndrome, lumbar region, S/P Fusion (Dr. Paulino Trimble, [place] [address] 2001); Sinus congestion; Sleep apnea; and Type 2 diabetes mellitus without complication ([facility] Past Surgical History:  has a past surgical history that includes back surgery; Gallbladder surgery; Tubal ligation; Breast cyst excision; Appendectomy; Cholecystectomy; Spine surgery; lumbar fusion (2001); and Finger surgery (03/2015). Social History:  reports that she has been smoking Cigarettes. She has a 8.25 pack-year smoking history. She has quit using smokeless tobacco. She reports that she does not drink alcohol or use drugs. Family History: family history includes Breast Cancer in her sister; Diabetes in her brother; Heart Surgery in her mother. The patients home medications have been reviewed.     Allergies: Niacin and related and Penicillins    -------------------------------------------------- RESULTS -------------------------------------------------  Labs:  Results for orders placed or performed during the hospital encounter of 07/20/18   CBC auto differential   Result Value Ref Range    WBC 6.2 4.5 - 11.5 E9/L    RBC 3.73 3.50 - 5.50 E12/L reviewed. BP (!) 146/59   Pulse 66   Temp 96.7 °F (35.9 °C) (Temporal)   Resp 18   Ht 5' 9\" (1.753 m)   Wt 250 lb (113.4 kg)   LMP 10/17/2012   SpO2 96%   BMI 36.92 kg/m²   Oxygen Saturation Interpretation: Normal      ------------------------------------------ PROGRESS NOTES ------------------------------------------  6:14 PM  I have spoken with the patient and discussed todays results, in addition to providing specific details for the plan of care and counseling regarding the diagnosis and prognosis. Their questions are answered at this time and they are agreeable with the plan. I discussed at length with them reasons for immediate return here for re evaluation. They will followup with PCP.       --------------------------------- ADDITIONAL PROVIDER NOTES ---------------------------------  At this time the patient is without objective evidence of an acute process requiring hospitalization or inpatient management. They have remained hemodynamically stable throughout their entire ED visit and are stable for discharge with outpatient follow-up. The plan has been discussed in detail and they are aware of the specific conditions for emergent return, as well as the importance of follow-up. New Prescriptions    No medications on file       Diagnosis:  1. Lymphedema    2. Leg swelling        Disposition:  Patient's disposition: Discharge to home  Patient's condition is stable.             Destiny Owen,   Resident  07/20/18 5768

## 2018-07-27 ENCOUNTER — TELEPHONE (OUTPATIENT)
Dept: PAIN MANAGEMENT | Age: 58
End: 2018-07-27

## 2018-08-13 PROBLEM — I50.43 CHF (CONGESTIVE HEART FAILURE), NYHA CLASS I, ACUTE ON CHRONIC, COMBINED (HCC): Status: ACTIVE | Noted: 2018-08-13

## 2018-08-16 ENCOUNTER — OFFICE VISIT (OUTPATIENT)
Dept: PAIN MANAGEMENT | Age: 58
End: 2018-08-16
Payer: COMMERCIAL

## 2018-08-16 VITALS
OXYGEN SATURATION: 98 % | TEMPERATURE: 98 F | HEART RATE: 62 BPM | SYSTOLIC BLOOD PRESSURE: 110 MMHG | RESPIRATION RATE: 16 BRPM | DIASTOLIC BLOOD PRESSURE: 60 MMHG

## 2018-08-16 DIAGNOSIS — M54.5 CHRONIC LOW BACK PAIN, UNSPECIFIED BACK PAIN LATERALITY, WITH SCIATICA PRESENCE UNSPECIFIED: Chronic | ICD-10-CM

## 2018-08-16 DIAGNOSIS — G89.29 CHRONIC LOW BACK PAIN, UNSPECIFIED BACK PAIN LATERALITY, WITH SCIATICA PRESENCE UNSPECIFIED: Chronic | ICD-10-CM

## 2018-08-16 PROCEDURE — G8417 CALC BMI ABV UP PARAM F/U: HCPCS | Performed by: PAIN MEDICINE

## 2018-08-16 PROCEDURE — 3017F COLORECTAL CA SCREEN DOC REV: CPT | Performed by: PAIN MEDICINE

## 2018-08-16 PROCEDURE — 99213 OFFICE O/P EST LOW 20 MIN: CPT | Performed by: PAIN MEDICINE

## 2018-08-16 PROCEDURE — G8427 DOCREV CUR MEDS BY ELIG CLIN: HCPCS | Performed by: PAIN MEDICINE

## 2018-08-16 PROCEDURE — 4004F PT TOBACCO SCREEN RCVD TLK: CPT | Performed by: PAIN MEDICINE

## 2018-08-16 NOTE — PROGRESS NOTES
[address] [place] [address], [address] [place] [address] Alli  [phone]    Follow up Note      Helena Bowman     Date of Visit:  8/16/2018    CC:  Patient presents for follow up   Chief Complaint   Patient presents with    Lower Back Pain       HPI:    Pain is unchanged  Change in quality of symptoms:no. Medication side effects:none. Recent diagnostic testing:none. Recent interventional procedures:none. She has been on anticoagulation medications to include ASA and has not been on herbal supplements. She is diabetic.     Imaging: CT Lumbar spine 08/2016  Hardware fixation failure is similar in appearance to prior examination dated 10/2012  Mild grade 1 anterolisthesis of L5 on S1 is similar in appearance to prior examination   Moderate spinal stenosis at L4-5   Multilevel at least mild spinal stenosis at other regions of the lumbar spine    Cervical spine MRI 2012  Degenerative straightening of the normal cervical lordosis, disc space loss C5-6 through C7-T1, endplate and spondylotic changes C6-7 through C7-T1  C5-6 moderately severe central spinal and right sided foraminal stenosis      Previous treatments: Physical Therapy, Nerve block, Epidural Steroid Injection, Surgery L5-S1 fusion and medications. .         Potential Aberrant Drug-Related Behavior:  ??     Urine Drug Screening:  First office visit saliva screen showed no narcotics which is consistent    OARRS report[de-identified]  08/2018 consistent     Past Medical History:   Diagnosis Date    Arthritis     Back pain     Bladder infection     Blood transfusion 2001    Cervical facet syndrome ([address] 9/1/2012    Chest pain     CHF (congestive heart failure), NYHA class I, acute on chronic, combined ([address] 6/25/2018    COPD (chronic obstructive pulmonary disease) (HCC)     Degenerative disc disease, cervical     Degenerative Osteoarthritis of cervical spine 9/1/2012    Degenerative Osteoarthritis of lumbar spine 9/1/2012

## 2018-08-23 ENCOUNTER — HOSPITAL ENCOUNTER (EMERGENCY)
Age: 58
Discharge: LEFT BEFORE DISCHARGE | End: 2018-08-23
Payer: COMMERCIAL

## 2018-08-23 ENCOUNTER — HOSPITAL ENCOUNTER (OUTPATIENT)
Dept: ULTRASOUND IMAGING | Age: 58
Discharge: HOME | End: 2018-08-25
Payer: COMMERCIAL

## 2018-08-23 VITALS
RESPIRATION RATE: 16 BRPM | HEART RATE: 78 BPM | OXYGEN SATURATION: 99 % | DIASTOLIC BLOOD PRESSURE: 73 MMHG | TEMPERATURE: 98.1 F | SYSTOLIC BLOOD PRESSURE: 166 MMHG

## 2018-08-23 LAB
ALBUMIN SERPL-MCNC: 3.9 G/DL (ref 3.5–5.2)
ALP BLD-CCNC: 88 U/L (ref 35–104)
ALT SERPL-CCNC: 8 U/L (ref 0–32)
ANION GAP SERPL CALCULATED.3IONS-SCNC: 13 MMOL/L (ref 7–16)
AST SERPL-CCNC: 12 U/L (ref 0–31)
BASOPHILS ABSOLUTE: 0.05 E9/L (ref 0–0.2)
BASOPHILS RELATIVE PERCENT: 0.7 % (ref 0–2)
BILIRUB SERPL-MCNC: <0.2 MG/DL (ref 0–1.2)
BUN BLDV-MCNC: 29 MG/DL (ref 6–20)
CALCIUM SERPL-MCNC: 9.5 MG/DL (ref 8.6–10.2)
CHLORIDE BLD-SCNC: 98 MMOL/L (ref 98–107)
CO2: 26 MMOL/L (ref 22–29)
CREAT SERPL-MCNC: 1.6 MG/DL (ref 0.5–1)
EOSINOPHILS ABSOLUTE: 0.74 E9/L (ref 0.05–0.5)
EOSINOPHILS RELATIVE PERCENT: 9.9 % (ref 0–6)
GFR AFRICAN AMERICAN: 40
GFR NON-AFRICAN AMERICAN: 33 ML/MIN/1.73
GLUCOSE BLD-MCNC: 139 MG/DL (ref 74–109)
HCT VFR BLD CALC: 36.8 % (ref 34–48)
HEMOGLOBIN: 12.2 G/DL (ref 11.5–15.5)
IMMATURE GRANULOCYTES #: 0.02 E9/L
IMMATURE GRANULOCYTES %: 0.3 % (ref 0–5)
LYMPHOCYTES ABSOLUTE: 2.41 E9/L (ref 1.5–4)
LYMPHOCYTES RELATIVE PERCENT: 32.2 % (ref 20–42)
MCH RBC QN AUTO: 32.2 PG (ref 26–35)
MCHC RBC AUTO-ENTMCNC: 33.2 % (ref 32–34.5)
MCV RBC AUTO: 97.1 FL (ref 80–99.9)
MONOCYTES ABSOLUTE: 0.43 E9/L (ref 0.1–0.95)
MONOCYTES RELATIVE PERCENT: 5.7 % (ref 2–12)
NEUTROPHILS ABSOLUTE: 3.84 E9/L (ref 1.8–7.3)
NEUTROPHILS RELATIVE PERCENT: 51.2 % (ref 43–80)
PDW BLD-RTO: 14.7 FL (ref 11.5–15)
PLATELET # BLD: 245 E9/L (ref 130–450)
PMV BLD AUTO: 10.8 FL (ref 7–12)
POTASSIUM SERPL-SCNC: 4.2 MMOL/L (ref 3.5–5)
RBC # BLD: 3.79 E12/L (ref 3.5–5.5)
SODIUM BLD-SCNC: 137 MMOL/L (ref 132–146)
TOTAL PROTEIN: 7.1 G/DL (ref 6.4–8.3)
WBC # BLD: 7.5 E9/L (ref 4.5–11.5)

## 2018-08-23 PROCEDURE — 36415 COLL VENOUS BLD VENIPUNCTURE: CPT

## 2018-08-23 PROCEDURE — 4500000002 HC ER NO CHARGE

## 2018-08-23 PROCEDURE — 85025 COMPLETE CBC W/AUTO DIFF WBC: CPT

## 2018-08-23 PROCEDURE — 80053 COMPREHEN METABOLIC PANEL: CPT

## 2018-08-23 RX ORDER — HYDROCODONE BITARTRATE AND ACETAMINOPHEN 5; 325 MG/1; MG/1
1 TABLET ORAL ONCE
Status: DISCONTINUED | OUTPATIENT
Start: 2018-08-23 | End: 2018-08-23 | Stop reason: HOSPADM

## 2018-08-23 ASSESSMENT — PAIN DESCRIPTION - DESCRIPTORS: DESCRIPTORS: ACHING

## 2018-08-23 ASSESSMENT — PAIN SCALES - GENERAL: PAINLEVEL_OUTOF10: 10

## 2018-08-23 ASSESSMENT — PAIN DESCRIPTION - PAIN TYPE: TYPE: ACUTE PAIN

## 2018-08-23 ASSESSMENT — PAIN DESCRIPTION - LOCATION: LOCATION: LEG

## 2018-08-29 ENCOUNTER — HOSPITAL ENCOUNTER (OUTPATIENT)
Age: 58
Setting detail: OBSERVATION
Discharge: OTHER INSTITUTION | End: 2018-09-01
Attending: EMERGENCY MEDICINE | Admitting: INTERNAL MEDICINE
Payer: COMMERCIAL

## 2018-08-29 ENCOUNTER — APPOINTMENT (OUTPATIENT)
Dept: GENERAL RADIOLOGY | Age: 58
End: 2018-08-29
Payer: COMMERCIAL

## 2018-08-29 DIAGNOSIS — M54.5 CHRONIC LOW BACK PAIN, UNSPECIFIED BACK PAIN LATERALITY, WITH SCIATICA PRESENCE UNSPECIFIED: Chronic | ICD-10-CM

## 2018-08-29 DIAGNOSIS — M54.50 CHRONIC MIDLINE LOW BACK PAIN WITHOUT SCIATICA: ICD-10-CM

## 2018-08-29 DIAGNOSIS — G89.29 CHRONIC LOW BACK PAIN, UNSPECIFIED BACK PAIN LATERALITY, WITH SCIATICA PRESENCE UNSPECIFIED: Chronic | ICD-10-CM

## 2018-08-29 DIAGNOSIS — M79.89 LEG SWELLING: Primary | ICD-10-CM

## 2018-08-29 DIAGNOSIS — G89.29 CHRONIC MIDLINE LOW BACK PAIN WITHOUT SCIATICA: ICD-10-CM

## 2018-08-29 LAB
ALBUMIN SERPL-MCNC: 3.9 G/DL (ref 3.5–5.2)
ALP BLD-CCNC: 72 U/L (ref 35–104)
ALT SERPL-CCNC: 9 U/L (ref 0–32)
ANION GAP SERPL CALCULATED.3IONS-SCNC: 12 MMOL/L (ref 7–16)
AST SERPL-CCNC: 13 U/L (ref 0–31)
BASOPHILS ABSOLUTE: 0.05 E9/L (ref 0–0.2)
BASOPHILS RELATIVE PERCENT: 0.7 % (ref 0–2)
BILIRUB SERPL-MCNC: <0.2 MG/DL (ref 0–1.2)
BILIRUBIN URINE: NEGATIVE
BLOOD, URINE: NEGATIVE
BUN BLDV-MCNC: 26 MG/DL (ref 6–20)
CALCIUM SERPL-MCNC: 9.3 MG/DL (ref 8.6–10.2)
CHLORIDE BLD-SCNC: 102 MMOL/L (ref 98–107)
CLARITY: CLEAR
CO2: 27 MMOL/L (ref 22–29)
COLOR: YELLOW
CREAT SERPL-MCNC: 1.2 MG/DL (ref 0.5–1)
EKG ATRIAL RATE: 71 BPM
EKG P AXIS: 70 DEGREES
EKG P-R INTERVAL: 156 MS
EKG Q-T INTERVAL: 400 MS
EKG QRS DURATION: 76 MS
EKG QTC CALCULATION (BAZETT): 434 MS
EKG R AXIS: 21 DEGREES
EKG T AXIS: 58 DEGREES
EKG VENTRICULAR RATE: 71 BPM
EOSINOPHILS ABSOLUTE: 0.53 E9/L (ref 0.05–0.5)
EOSINOPHILS RELATIVE PERCENT: 7.6 % (ref 0–6)
GFR AFRICAN AMERICAN: 56
GFR NON-AFRICAN AMERICAN: 46 ML/MIN/1.73
GLUCOSE BLD-MCNC: 114 MG/DL (ref 74–109)
GLUCOSE URINE: NEGATIVE MG/DL
HCT VFR BLD CALC: 35.3 % (ref 34–48)
HEMOGLOBIN: 11.5 G/DL (ref 11.5–15.5)
IMMATURE GRANULOCYTES #: 0.02 E9/L
IMMATURE GRANULOCYTES %: 0.3 % (ref 0–5)
KETONES, URINE: NEGATIVE MG/DL
LEUKOCYTE ESTERASE, URINE: NEGATIVE
LYMPHOCYTES ABSOLUTE: 2.46 E9/L (ref 1.5–4)
LYMPHOCYTES RELATIVE PERCENT: 35.5 % (ref 20–42)
MCH RBC QN AUTO: 31.4 PG (ref 26–35)
MCHC RBC AUTO-ENTMCNC: 32.6 % (ref 32–34.5)
MCV RBC AUTO: 96.4 FL (ref 80–99.9)
MONOCYTES ABSOLUTE: 0.39 E9/L (ref 0.1–0.95)
MONOCYTES RELATIVE PERCENT: 5.6 % (ref 2–12)
NEUTROPHILS ABSOLUTE: 3.48 E9/L (ref 1.8–7.3)
NEUTROPHILS RELATIVE PERCENT: 50.3 % (ref 43–80)
NITRITE, URINE: NEGATIVE
PDW BLD-RTO: 14.9 FL (ref 11.5–15)
PH UA: 6 (ref 5–9)
PLATELET # BLD: 228 E9/L (ref 130–450)
PMV BLD AUTO: 11.1 FL (ref 7–12)
POTASSIUM SERPL-SCNC: 3.4 MMOL/L (ref 3.5–5)
PRO-BNP: 272 PG/ML (ref 0–125)
PROTEIN UA: NEGATIVE MG/DL
RBC # BLD: 3.66 E12/L (ref 3.5–5.5)
SODIUM BLD-SCNC: 141 MMOL/L (ref 132–146)
SPECIFIC GRAVITY UA: 1.02 (ref 1–1.03)
TOTAL PROTEIN: 6.9 G/DL (ref 6.4–8.3)
TROPONIN: <0.01 NG/ML (ref 0–0.03)
UROBILINOGEN, URINE: 0.2 E.U./DL
WBC # BLD: 6.9 E9/L (ref 4.5–11.5)

## 2018-08-29 PROCEDURE — 71046 X-RAY EXAM CHEST 2 VIEWS: CPT

## 2018-08-29 PROCEDURE — 96374 THER/PROPH/DIAG INJ IV PUSH: CPT

## 2018-08-29 PROCEDURE — 81003 URINALYSIS AUTO W/O SCOPE: CPT

## 2018-08-29 PROCEDURE — 99285 EMERGENCY DEPT VISIT HI MDM: CPT

## 2018-08-29 PROCEDURE — 93005 ELECTROCARDIOGRAM TRACING: CPT | Performed by: NURSE PRACTITIONER

## 2018-08-29 PROCEDURE — 83880 ASSAY OF NATRIURETIC PEPTIDE: CPT

## 2018-08-29 PROCEDURE — 6360000002 HC RX W HCPCS: Performed by: EMERGENCY MEDICINE

## 2018-08-29 PROCEDURE — 85025 COMPLETE CBC W/AUTO DIFF WBC: CPT

## 2018-08-29 PROCEDURE — 84484 ASSAY OF TROPONIN QUANT: CPT

## 2018-08-29 PROCEDURE — 36415 COLL VENOUS BLD VENIPUNCTURE: CPT

## 2018-08-29 PROCEDURE — 80053 COMPREHEN METABOLIC PANEL: CPT

## 2018-08-29 RX ORDER — MORPHINE SULFATE 4 MG/ML
4 INJECTION, SOLUTION INTRAMUSCULAR; INTRAVENOUS ONCE
Status: COMPLETED | OUTPATIENT
Start: 2018-08-29 | End: 2018-08-29

## 2018-08-29 RX ADMIN — Medication 4 MG: at 23:59

## 2018-08-29 ASSESSMENT — PAIN SCALES - GENERAL
PAINLEVEL_OUTOF10: 10
PAINLEVEL_OUTOF10: 10

## 2018-08-29 ASSESSMENT — PAIN DESCRIPTION - PAIN TYPE: TYPE: CHRONIC PAIN

## 2018-08-29 ASSESSMENT — PAIN DESCRIPTION - LOCATION: LOCATION: BACK

## 2018-08-29 NOTE — Clinical Note
Patient Class: Observation [104]   REQUIRED: Diagnosis: Leg swelling [806350]   Estimated Length of Stay: Estimated stay of less than 2 midnights   Future Attending Provider: LEOBARDO Austin [de-identified]

## 2018-08-29 NOTE — ED NOTES
FIRST PROVIDER CONTACT ASSESSMENT NOTE      Department of Emergency Medicine   8/29/18  6:18 PM    Chief Complaint: Shortness of Breath (STARTED EARLIER TODAY); Elbow Pain (SHARP PAIN IN HER ELBOW GOING TO HER UPPER ARM); and Leg Swelling (BILATERAL LEG SWELLING)      History of Present Illness:    Марина Head is a 66 y.o. female who presents to the ED by private car for Shortness of breath as well as continued lower extremity edema and left elbow pain. Patient denies any injury to her left elbow states the pain has been there for 2 months just, comes and goes. Patient reports that she does have lymphedema to bilateral lower extremities. Reports that the swelling has been there for several months. Patient also at one point was in pain management but is not now. Focused Screening Exam:  Constitutional:  Alert, appears stated age and is in no distress.     *ALLERGIES*     Niacin and related and Penicillins     ED Triage Vitals [08/29/18 1812]   BP Temp Temp Source Pulse Resp SpO2 Height Weight   129/82 97.3 °F (36.3 °C) Temporal 80 18 99 % 5' 8\" (1.727 m) 250 lb (113.4 kg)        Initial Plan of Care:  Initiate Treatment-Testing, Proceed toTreatment Area When Bed Available for ED Attending/MLP to Continue Care    -----------------END OF FIRST PROVIDER CONTACT ASSESSMENT NOTE--------------  Electronically signed by JENNY Kelley CNP   DD: 8/29/18         JENNY Kelley CNP  08/29/18 9721

## 2018-08-30 PROBLEM — M79.89 LEG SWELLING: Status: ACTIVE | Noted: 2018-08-30

## 2018-08-30 LAB
ALBUMIN SERPL-MCNC: 3.6 G/DL (ref 3.5–5.2)
ALP BLD-CCNC: 86 U/L (ref 35–104)
ALT SERPL-CCNC: 34 U/L (ref 0–32)
ANION GAP SERPL CALCULATED.3IONS-SCNC: 16 MMOL/L (ref 7–16)
AST SERPL-CCNC: 86 U/L (ref 0–31)
BASOPHILS ABSOLUTE: 0.04 E9/L (ref 0–0.2)
BASOPHILS RELATIVE PERCENT: 0.6 % (ref 0–2)
BILIRUB SERPL-MCNC: 0.4 MG/DL (ref 0–1.2)
BUN BLDV-MCNC: 23 MG/DL (ref 6–20)
CALCIUM SERPL-MCNC: 8.8 MG/DL (ref 8.6–10.2)
CHLORIDE BLD-SCNC: 99 MMOL/L (ref 98–107)
CO2: 25 MMOL/L (ref 22–29)
CREAT SERPL-MCNC: 1.1 MG/DL (ref 0.5–1)
EOSINOPHILS ABSOLUTE: 0.38 E9/L (ref 0.05–0.5)
EOSINOPHILS RELATIVE PERCENT: 6.1 % (ref 0–6)
FOLATE: 7.3 NG/ML (ref 4.8–24.2)
GFR AFRICAN AMERICAN: >60
GFR NON-AFRICAN AMERICAN: 51 ML/MIN/1.73
GLUCOSE BLD-MCNC: 96 MG/DL (ref 74–109)
HBA1C MFR BLD: 7.7 % (ref 4–5.6)
HCT VFR BLD CALC: 33.1 % (ref 34–48)
HEMOGLOBIN: 11 G/DL (ref 11.5–15.5)
IMMATURE GRANULOCYTES #: 0.02 E9/L
IMMATURE GRANULOCYTES %: 0.3 % (ref 0–5)
LYMPHOCYTES ABSOLUTE: 2.24 E9/L (ref 1.5–4)
LYMPHOCYTES RELATIVE PERCENT: 35.8 % (ref 20–42)
MAGNESIUM: 1.9 MG/DL (ref 1.6–2.6)
MCH RBC QN AUTO: 31.5 PG (ref 26–35)
MCHC RBC AUTO-ENTMCNC: 33.2 % (ref 32–34.5)
MCV RBC AUTO: 94.8 FL (ref 80–99.9)
METER GLUCOSE: 113 MG/DL (ref 70–110)
METER GLUCOSE: 121 MG/DL (ref 70–110)
METER GLUCOSE: 162 MG/DL (ref 70–110)
METER GLUCOSE: 94 MG/DL (ref 70–110)
MONOCYTES ABSOLUTE: 0.38 E9/L (ref 0.1–0.95)
MONOCYTES RELATIVE PERCENT: 6.1 % (ref 2–12)
NEUTROPHILS ABSOLUTE: 3.19 E9/L (ref 1.8–7.3)
NEUTROPHILS RELATIVE PERCENT: 51.1 % (ref 43–80)
PDW BLD-RTO: 14.9 FL (ref 11.5–15)
PLATELET # BLD: 188 E9/L (ref 130–450)
PMV BLD AUTO: 11.2 FL (ref 7–12)
POTASSIUM REFLEX MAGNESIUM: 3.5 MMOL/L (ref 3.5–5)
RBC # BLD: 3.49 E12/L (ref 3.5–5.5)
SODIUM BLD-SCNC: 140 MMOL/L (ref 132–146)
TOTAL PROTEIN: 6.5 G/DL (ref 6.4–8.3)
TSH SERPL DL<=0.05 MIU/L-ACNC: 1.89 UIU/ML (ref 0.27–4.2)
VITAMIN B-12: 494 PG/ML (ref 211–946)
WBC # BLD: 6.3 E9/L (ref 4.5–11.5)

## 2018-08-30 PROCEDURE — 6370000000 HC RX 637 (ALT 250 FOR IP)

## 2018-08-30 PROCEDURE — 6360000002 HC RX W HCPCS: Performed by: INTERNAL MEDICINE

## 2018-08-30 PROCEDURE — 6370000000 HC RX 637 (ALT 250 FOR IP): Performed by: INTERNAL MEDICINE

## 2018-08-30 PROCEDURE — 97530 THERAPEUTIC ACTIVITIES: CPT

## 2018-08-30 PROCEDURE — G0378 HOSPITAL OBSERVATION PER HR: HCPCS

## 2018-08-30 PROCEDURE — 97165 OT EVAL LOW COMPLEX 30 MIN: CPT

## 2018-08-30 PROCEDURE — 97162 PT EVAL MOD COMPLEX 30 MIN: CPT

## 2018-08-30 PROCEDURE — 82746 ASSAY OF FOLIC ACID SERUM: CPT

## 2018-08-30 PROCEDURE — 82962 GLUCOSE BLOOD TEST: CPT

## 2018-08-30 PROCEDURE — 85025 COMPLETE CBC W/AUTO DIFF WBC: CPT

## 2018-08-30 PROCEDURE — 83036 HEMOGLOBIN GLYCOSYLATED A1C: CPT

## 2018-08-30 PROCEDURE — 84443 ASSAY THYROID STIM HORMONE: CPT

## 2018-08-30 PROCEDURE — 80053 COMPREHEN METABOLIC PANEL: CPT

## 2018-08-30 PROCEDURE — 82607 VITAMIN B-12: CPT

## 2018-08-30 PROCEDURE — G8987 SELF CARE CURRENT STATUS: HCPCS

## 2018-08-30 PROCEDURE — G8988 SELF CARE GOAL STATUS: HCPCS

## 2018-08-30 PROCEDURE — 96372 THER/PROPH/DIAG INJ SC/IM: CPT

## 2018-08-30 PROCEDURE — 83735 ASSAY OF MAGNESIUM: CPT

## 2018-08-30 PROCEDURE — 2580000003 HC RX 258: Performed by: INTERNAL MEDICINE

## 2018-08-30 PROCEDURE — 36415 COLL VENOUS BLD VENIPUNCTURE: CPT

## 2018-08-30 RX ORDER — INSULIN GLARGINE 100 [IU]/ML
75 INJECTION, SOLUTION SUBCUTANEOUS EVERY MORNING
Status: DISCONTINUED | OUTPATIENT
Start: 2018-08-30 | End: 2018-09-01 | Stop reason: HOSPADM

## 2018-08-30 RX ORDER — DEXTROSE MONOHYDRATE 25 G/50ML
12.5 INJECTION, SOLUTION INTRAVENOUS PRN
Status: DISCONTINUED | OUTPATIENT
Start: 2018-08-30 | End: 2018-09-01 | Stop reason: HOSPADM

## 2018-08-30 RX ORDER — NICOTINE POLACRILEX 4 MG
15 LOZENGE BUCCAL PRN
Status: DISCONTINUED | OUTPATIENT
Start: 2018-08-30 | End: 2018-09-01 | Stop reason: HOSPADM

## 2018-08-30 RX ORDER — LISINOPRIL 20 MG/1
20 TABLET ORAL DAILY
Status: DISCONTINUED | OUTPATIENT
Start: 2018-08-30 | End: 2018-09-01 | Stop reason: HOSPADM

## 2018-08-30 RX ORDER — ACETAMINOPHEN 325 MG/1
650 TABLET ORAL EVERY 4 HOURS PRN
Status: DISCONTINUED | OUTPATIENT
Start: 2018-08-30 | End: 2018-09-01 | Stop reason: HOSPADM

## 2018-08-30 RX ORDER — DEXTROSE MONOHYDRATE 50 MG/ML
100 INJECTION, SOLUTION INTRAVENOUS PRN
Status: DISCONTINUED | OUTPATIENT
Start: 2018-08-30 | End: 2018-09-01 | Stop reason: HOSPADM

## 2018-08-30 RX ORDER — SODIUM CHLORIDE 0.9 % (FLUSH) 0.9 %
10 SYRINGE (ML) INJECTION PRN
Status: DISCONTINUED | OUTPATIENT
Start: 2018-08-30 | End: 2018-09-01 | Stop reason: HOSPADM

## 2018-08-30 RX ORDER — ONDANSETRON 2 MG/ML
4 INJECTION INTRAMUSCULAR; INTRAVENOUS EVERY 6 HOURS PRN
Status: DISCONTINUED | OUTPATIENT
Start: 2018-08-30 | End: 2018-09-01 | Stop reason: HOSPADM

## 2018-08-30 RX ORDER — HYDROCHLOROTHIAZIDE 25 MG/1
25 TABLET ORAL DAILY
Status: DISCONTINUED | OUTPATIENT
Start: 2018-08-30 | End: 2018-09-01 | Stop reason: HOSPADM

## 2018-08-30 RX ORDER — IBUPROFEN 400 MG/1
400 TABLET ORAL EVERY 6 HOURS PRN
Status: DISCONTINUED | OUTPATIENT
Start: 2018-08-30 | End: 2018-09-01 | Stop reason: HOSPADM

## 2018-08-30 RX ORDER — FUROSEMIDE 40 MG/1
40 TABLET ORAL DAILY
Status: DISCONTINUED | OUTPATIENT
Start: 2018-08-30 | End: 2018-09-01 | Stop reason: HOSPADM

## 2018-08-30 RX ORDER — DULOXETIN HYDROCHLORIDE 60 MG/1
60 CAPSULE, DELAYED RELEASE ORAL DAILY
Status: DISCONTINUED | OUTPATIENT
Start: 2018-08-30 | End: 2018-09-01 | Stop reason: HOSPADM

## 2018-08-30 RX ORDER — GABAPENTIN 400 MG/1
400 CAPSULE ORAL 4 TIMES DAILY
Status: DISCONTINUED | OUTPATIENT
Start: 2018-08-30 | End: 2018-09-01 | Stop reason: HOSPADM

## 2018-08-30 RX ORDER — LORAZEPAM 0.5 MG/1
0.5 TABLET ORAL NIGHTLY PRN
Status: DISCONTINUED | OUTPATIENT
Start: 2018-08-30 | End: 2018-09-01 | Stop reason: HOSPADM

## 2018-08-30 RX ORDER — POTASSIUM CHLORIDE 20 MEQ/1
40 TABLET, EXTENDED RELEASE ORAL ONCE
Status: COMPLETED | OUTPATIENT
Start: 2018-08-30 | End: 2018-08-30

## 2018-08-30 RX ORDER — POTASSIUM CHLORIDE 20 MEQ/1
TABLET, EXTENDED RELEASE ORAL
Status: COMPLETED
Start: 2018-08-30 | End: 2018-08-30

## 2018-08-30 RX ORDER — SODIUM CHLORIDE 0.9 % (FLUSH) 0.9 %
10 SYRINGE (ML) INJECTION EVERY 12 HOURS SCHEDULED
Status: DISCONTINUED | OUTPATIENT
Start: 2018-08-30 | End: 2018-09-01 | Stop reason: HOSPADM

## 2018-08-30 RX ORDER — METOPROLOL TARTRATE 50 MG/1
50 TABLET, FILM COATED ORAL DAILY
Status: DISCONTINUED | OUTPATIENT
Start: 2018-08-30 | End: 2018-09-01 | Stop reason: HOSPADM

## 2018-08-30 RX ORDER — METOPROLOL TARTRATE AND HYDROCHLOROTHIAZIDE 50; 25 MG/1; MG/1
1 TABLET ORAL DAILY
Status: DISCONTINUED | OUTPATIENT
Start: 2018-08-30 | End: 2018-08-30 | Stop reason: ALTCHOICE

## 2018-08-30 RX ADMIN — LISINOPRIL 20 MG: 20 TABLET ORAL at 08:44

## 2018-08-30 RX ADMIN — Medication 10 ML: at 08:45

## 2018-08-30 RX ADMIN — IBUPROFEN 400 MG: 400 TABLET, FILM COATED ORAL at 12:32

## 2018-08-30 RX ADMIN — LORAZEPAM 0.5 MG: 0.5 TABLET ORAL at 22:31

## 2018-08-30 RX ADMIN — Medication 10 ML: at 22:32

## 2018-08-30 RX ADMIN — GABAPENTIN 400 MG: 400 CAPSULE ORAL at 08:44

## 2018-08-30 RX ADMIN — POTASSIUM CHLORIDE 40 MEQ: 20 TABLET, EXTENDED RELEASE ORAL at 03:26

## 2018-08-30 RX ADMIN — LINAGLIPTIN 5 MG: 5 TABLET, FILM COATED ORAL at 08:45

## 2018-08-30 RX ADMIN — METOPROLOL TARTRATE 50 MG: 50 TABLET, FILM COATED ORAL at 08:44

## 2018-08-30 RX ADMIN — DULOXETINE HYDROCHLORIDE 60 MG: 60 CAPSULE, DELAYED RELEASE ORAL at 08:44

## 2018-08-30 RX ADMIN — GABAPENTIN 400 MG: 400 CAPSULE ORAL at 17:15

## 2018-08-30 RX ADMIN — ENOXAPARIN SODIUM 40 MG: 40 INJECTION SUBCUTANEOUS at 08:45

## 2018-08-30 RX ADMIN — INSULIN GLARGINE 75 UNITS: 100 INJECTION, SOLUTION SUBCUTANEOUS at 08:52

## 2018-08-30 RX ADMIN — METFORMIN HYDROCHLORIDE 1000 MG: 1000 TABLET ORAL at 08:44

## 2018-08-30 RX ADMIN — HYDROCHLOROTHIAZIDE 25 MG: 25 TABLET ORAL at 08:45

## 2018-08-30 RX ADMIN — GABAPENTIN 400 MG: 400 CAPSULE ORAL at 12:32

## 2018-08-30 RX ADMIN — FUROSEMIDE 40 MG: 40 TABLET ORAL at 08:44

## 2018-08-30 RX ADMIN — METFORMIN HYDROCHLORIDE 1000 MG: 1000 TABLET ORAL at 17:15

## 2018-08-30 RX ADMIN — IBUPROFEN 400 MG: 400 TABLET, FILM COATED ORAL at 22:31

## 2018-08-30 RX ADMIN — GABAPENTIN 400 MG: 400 CAPSULE ORAL at 22:30

## 2018-08-30 ASSESSMENT — ENCOUNTER SYMPTOMS: CHEST TIGHTNESS: 0

## 2018-08-30 ASSESSMENT — PAIN DESCRIPTION - FREQUENCY
FREQUENCY: CONTINUOUS

## 2018-08-30 ASSESSMENT — PAIN DESCRIPTION - PAIN TYPE
TYPE: CHRONIC PAIN

## 2018-08-30 ASSESSMENT — PAIN DESCRIPTION - LOCATION
LOCATION: GENERALIZED
LOCATION: GENERALIZED;BACK
LOCATION: GENERALIZED

## 2018-08-30 ASSESSMENT — PAIN DESCRIPTION - ONSET
ONSET: ON-GOING

## 2018-08-30 ASSESSMENT — PAIN SCALES - GENERAL
PAINLEVEL_OUTOF10: 9
PAINLEVEL_OUTOF10: 8
PAINLEVEL_OUTOF10: 8
PAINLEVEL_OUTOF10: 5
PAINLEVEL_OUTOF10: 5
PAINLEVEL_OUTOF10: 10

## 2018-08-30 ASSESSMENT — PAIN DESCRIPTION - DESCRIPTORS
DESCRIPTORS: ACHING;DISCOMFORT
DESCRIPTORS: ACHING;DISCOMFORT
DESCRIPTORS: ACHING;DISCOMFORT;SHARP

## 2018-08-30 ASSESSMENT — PAIN DESCRIPTION - PROGRESSION: CLINICAL_PROGRESSION: NOT CHANGED

## 2018-08-30 NOTE — H&P
History and Physical      CHIEF COMPLAINT:  edema      HISTORY OF PRESENT ILLNESS:      The patient is a 66 y.o. female patient of Dr Nika Hernandez who presents with edema and pain. She has a history of chronic back pain related to spinal cord disease. She's been to multiple pain management specialist. She is wheelchair-bound. Recently she noticed increasing lower extremity edema causing difficulty ambulating. She presented emergency room because increased pain. She denies any nausea vomiting shortness of breath chest pain abdominal pain or diarrhea. She was evaluated in [facility] surgery was considered an option patient can stabilize her morbidities including her diabetes. Does suffer from diabetic peripheral neuropathy as well.     Past Medical History:    Past Medical History:   Diagnosis Date    Arthritis     Back pain     Bladder infection     Blood transfusion 2001    Cervical facet syndrome (HCC) 9/1/2012    Chest pain     CHF (congestive heart failure), NYHA class I, acute on chronic, combined ([address] 6/25/2018    COPD (chronic obstructive pulmonary disease) ([facility])     Degenerative disc disease, cervical     Degenerative Osteoarthritis of cervical spine 9/1/2012    Degenerative Osteoarthritis of lumbar spine 9/1/2012    Diabetes mellitus, insulin dependent (IDDM), uncontrolled ([address]     Diabetic Peripheral neuropathy 9/1/2012    Facet syndrome, lumbar ([address] 9/1/2012    FHx: migraine headaches     Fibrocystic breast     Generalized headaches     HIGH CHOLESTEROL     Hypertension     Irritable bowel     Kidney disease     Lumbago 6/13/2012    Lumbar radiculopathy Bilateral 9/1/2012    Migraines     Muscle weakness     Postlaminectomy syndrome, lumbar region, S/P Fusion (Dr. Otto Lang, [address] 2001) 6/13/2012    Sinus congestion     Sleep apnea     Type 2 diabetes mellitus without complication ([address]        Past Surgical History:    Past Surgical History:   Procedure Laterality Date    APPENDECTOMY      BACK SURGERY      spinal surgery    BREAST CYST EXCISION      CHOLECYSTECTOMY      FINGER SURGERY  03/2015    from a cat bite from March 12 2015   [facility]    L5-S1 posterior lumbar interbody fusion    SPINE SURGERY      TUBAL LIGATION         Medications Prior to Admission:    Prescriptions Prior to Admission: temazepam (RESTORIL) 15 MG capsule, take 1 capsule by mouth at bedtime  furosemide (LASIX) 40 MG tablet, Take 40 mg by mouth daily  amLODIPine (NORVASC) 5 MG tablet, Take 5 mg by mouth daily  ibuprofen (ADVIL;MOTRIN) 200 MG tablet, Take 200 mg by mouth every 6 hours as needed for Pain Taking 7 tablets of advil 4 x day  lisinopril (PRINIVIL;ZESTRIL) 20 MG tablet, Take 20 mg by mouth daily  metoprolol-hydrochlorothiazide (LOPRESSOR HCT) 50-25 MG per tablet, Take 1 tablet by mouth daily  gabapentin (NEURONTIN) 400 MG capsule, Take 1 capsule by mouth 4 times daily (Patient taking differently: Take 400 mg by mouth 4 times daily. Patient taking 3 capsules twice daily. Kalee Copper )  sitaGLIPtan-metformin (JANUMET)  MG per tablet, Take 1 tablet by mouth 2 times daily (with meals)  LANTUS SOLOSTAR 100 UNIT/ML injection pen, inject 75 UNITS subcutaneously once daily (Patient taking differently: inject 78 UNITS subcutaneously once daily)  DULoxetine (CYMBALTA) 60 MG capsule, take 1 capsule by mouth twice a day    Allergies:    Niacin and related and Penicillins    Social History:    reports that she has been smoking Cigarettes. She has a 8.25 pack-year smoking history. She has quit using smokeless tobacco. She reports that she does not drink alcohol or use drugs. Family History:   family history includes Breast Cancer in her sister; Diabetes in her brother; Heart Surgery in her mother.     REVIEW OF SYSTEMS    Constitutional: negative for chills and fevers  Eyes: negative for icterus and redness  Ears, nose, mouth, throat, and face: negative for epistaxis, hearing loss, nasal congestion, sore mouth, sore throat and tinnitus  Respiratory: negative for cough and hemoptysis  Cardiovascular: negative for chest pain and claudication  Gastrointestinal: negative for abdominal pain and vomiting  Genitourinary:negative for dysuria and frequency  Integument/breast: negative for pruritus and rash  Hematologic/lymphatic: negative for bleeding and easy bruising  Musculoskeletal:positive for arthralgias, back pain, muscle weakness, myalgias and neck pain, negative for bone pain and stiff joints  Neurological: positive for gait problems and weakness, negative for headaches and memory problems  Behavioral/Psych: negative for anxiety and depression  Endocrine: negative for temperature intolerance  Allergic/Immunologic: negative for anaphylaxis and angioedema    PHYSICAL EXAM:    Vitals:  /84   Pulse 72   Temp 97.8 °F (36.6 °C) (Temporal)   Resp 16   Ht 5' 8\" (1.727 m)   Wt 250 lb (113.4 kg)   LMP 10/17/2012   SpO2 95%   BMI 38.01 kg/m²     General appearance: alert, appears stated age and cooperative  Head: Normocephalic, without obvious abnormality, atraumatic  Eyes: conjunctivae/corneas clear. PERRL, EOM's intact. Fundi benign. Ears: normal TM's and external ear canals both ears  Nose: Nares normal. Septum midline. Mucosa normal. No drainage or sinus tenderness.   Throat: lips, mucosa, and tongue normal; teeth and gums normal  Neck: no adenopathy, no carotid bruit, no JVD, supple, symmetrical, trachea midline and thyroid not enlarged, symmetric, no tenderness/mass/nodules  Lungs: clear to auscultation bilaterally  Heart: regular rate and rhythm, S1, S2 normal, no murmur, click, rub or gallop  Abdomen: soft, non-tender; bowel sounds normal; no masses,  no organomegaly  Extremities: extremities normal, atraumatic, no cyanosis or edema  Pulses: 2+ and symmetric  Skin: Skin color, texture, turgor normal. No rashes or lesions  Neurologic: Grossly normal  Results

## 2018-08-30 NOTE — PLAN OF CARE
Problem: Pain:  Goal: Patient's pain/discomfort is manageable  Patient's pain/discomfort is manageable   Outcome: Met This Shift

## 2018-08-30 NOTE — PROGRESS NOTES
OCCUPATIONAL THERAPY INITIAL EVALUATION      Date:2018  Patient Name: Naseem Franco  MRN: [id number]  : 1960  Room: [address] [place], [place] [address]     Evaluating OT: Donovan Sanderson OTR/L   License #  [id number]    Recommended Adaptive Equipment: TBA    AM PAC ADL RAW SCORE -    G code: CL    Diagnosis: Leg swelling  Surgery:   Past Surgical History:   Procedure Laterality Date    APPENDECTOMY      BACK SURGERY      spinal surgery    BREAST CYST EXCISION      CHOLECYSTECTOMY      FINGER SURGERY  2015    from a cat bite from 2015    GALLBLADDER SURGERY      LUMBAR FUSION      L5-S1 posterior lumbar interbody fusion    SPINE SURGERY      TUBAL LIGATION        Past Medical History:       Diagnosis Date    Arthritis     Back pain     Bladder infection     Blood transfusion     Cervical facet syndrome ([address] 2012    Chest pain     CHF (congestive heart failure), NYHA class I, acute on chronic, combined ([address] 2018    [facility]     Degenerative disc disease, cervical     Degenerative Osteoarthritis of cervical spine 2012    Degenerative Osteoarthritis of lumbar spine 2012    Diabetes mellitus, insulin dependent (IDDM), uncontrolled ([address]     Diabetic Peripheral neuropathy 2012    Facet syndrome, lumbar ([address] 2012    FHx: migraine headaches     Fibrocystic breast     Generalized headaches     HIGH CHOLESTEROL     Hypertension     Irritable bowel     Kidney disease     Lumbago 2012    Lumbar radiculopathy Bilateral 2012    Migraines     Muscle weakness     Postlaminectomy syndrome, lumbar region, S/P Fusion ([facility] 2012    Sinus congestion     Sleep apnea     Type 2 diabetes mellitus without complication ([facility])      Precautions: falls, B LE weakness     Pt was agreeable to eval/treatment this day: yes    Home Living: Pt lives with 79 y/o mother, states unable to assist, in a 1 story  with 1 stairs to enter and no rails; bed/bath on main. Pt. states there is 1 step up into bathroom  Bathroom setup: tub/shower, std. commode  Equipment owned: man. w/c, power w/c, w.walker    Prior Level of Function: pt. States was Ind. with ADLs Ind. with IADLs; using w/c primarily for ambulation. Pt. States she used w.walker during therapy for short distance. Also states she walked 3-4' into bathroom without any device, \"holding onto sink. \"  Driving: uses Transport services  Occupation: unemployed    Pain Level: pt c/o 10+/10 back, neck, UEs, LEs pain this session     Cognition: oriented x 4; follows 2 step directions. wfl Problem solving skills  wfl Memory   wfl Sequencing    Sensory:   Hearing: wfl  Vision: wfl    Glasses: yes [x] no [] reading []      UE Assessment:  Hand Dominance: Right []  Left [x]     Strength ROM Additional Info:    RUE   R shld. 3+/5  R elbow 3-/5  R wrist 3-/5  R hand 3+/5 R shld. To 90  R elbow wfl  R wrist 75%  R hand wfl weak  and fair- FMC/dexterity noted during ADL tasks     LUE L shld. 3+/5  L elbow 3-/5  L wrist 3-/5  L hand 3/5 L shld. To 90  L elbow wfl  L wrist 75%  L hand wfl with exception of index finger DIP contracture weak  and poor+ FMC/dexterity noted during ADL tasks   Sensation: pt. C/o N/T, burning sensations B LEs, L UE  Tone: wfl B UEs  Edema: none noted B UEs    Functional Assessment:   Initial Eval Status  Comments   Feeding  SBA/ s/u    Grooming  Min A    Upper Body Dressing Mod/Max A     Lower Body Dressing Dep with B socks, pants bed level    Bathing NT    Toileting  NT    Bed Mobility  Log roll: Max A   Supine to Sit: Max A of 2  Sit to Supine: Max A of 2    Functional Transfers Sit <> stand: Max A of 2 with w.walker     Functional Mobility Max A of 2 with attempted side step towards HOB, B knees blocked      Sit balance: SBA  Stand balance: Max A  Endurance/Activity tolerance: Poor+ with lt.  Ax.

## 2018-08-30 NOTE — ED PROVIDER NOTES
Pt reports that she is here for her chronic leg edema due to it becoming more painful. Pt was in pain management but her doctor does not want to see her anymore. She uses a motorized wheelchair. Pt does home rehab and has a home health nurse. Pt denies LOC, HA, SOB, CP, back pain, neck pain, n/v/d, fever, chills, dizziness, coughing or any other general complaints. Pt does not mention having SOB, abdominal pain or elbow pain although stated in triage. She has just come for her increase in pain. The history is provided by the patient. No  was used. Review of Systems   Constitutional: Negative for chills and fever. Respiratory: Negative for chest tightness. Cardiovascular: Negative for chest pain and palpitations. All other systems reviewed and are negative. Physical Exam   Constitutional: She is oriented to person, place, and time. She appears well-developed and well-nourished. No distress. HENT:   Head: Normocephalic and atraumatic. Eyes: Pupils are equal, round, and reactive to light. Conjunctivae are normal.   Neck: Normal range of motion. No thyromegaly present. Cardiovascular: Normal rate and regular rhythm. Pulmonary/Chest: Effort normal and breath sounds normal. No respiratory distress. Abdominal: Soft. She exhibits no distension. There is no tenderness. There is no rebound and no guarding. Musculoskeletal: Normal range of motion. She exhibits edema. She exhibits no tenderness. Neurological: She is alert and oriented to person, place, and time. No cranial nerve deficit. Coordination normal.   Skin: Skin is warm and dry. No erythema. Psychiatric: She has a normal mood and affect. Procedures    MDM         EKG: Sinus rhythm, premature atrial complexes, rate of 71, normal axis, no ST elevations or depressions, no T-wave abnormalities.          --------------------------------------------- PAST HISTORY Value Ref Range    Ventricular Rate 71 BPM    Atrial Rate 71 BPM    P-R Interval 156 ms    QRS Duration 76 ms    Q-T Interval 400 ms    QTc Calculation (Bazett) 434 ms    P Axis 70 degrees    R Axis 21 degrees    T Axis 58 degrees       Radiology:  XR CHEST STANDARD (2 VW)   Final Result      No airspace opacities or pleural effusion.                ------------------------- NURSING NOTES AND VITALS REVIEWED ---------------------------  Date / Time Roomed:  8/29/2018  8:13 PM  ED Bed Assignment:  Kashmir Downing    The nursing notes within the ED encounter and vital signs as below have been reviewed. /82   Pulse 80   Temp 97.3 °F (36.3 °C) (Temporal)   Resp 18   Ht 5' 8\" (1.727 m)   Wt 250 lb (113.4 kg)   LMP 10/17/2012   SpO2 99%   BMI 38.01 kg/m²   Oxygen Saturation Interpretation: Normal      ------------------------------------------ PROGRESS NOTES ------------------------------------------  I have spoken with the patient and discussed todays results, in addition to providing specific details for the plan of care and counseling regarding the diagnosis and prognosis. Their questions are answered at this time and they are agreeable with the plan. I discussed at length with them reasons for immediate return here for re evaluation. They will followup with primary care by calling their office tomorrow. 0040 - Patient is not having shortness of breath, not having abdominal pain, not having arm pain. She presents today because of the chronic pain in her back that she has. There is also continued leg swelling which is not unusual for her. She takes Lasix at home, she was instructed to take a double dose for the next 2 days. She fell about one week ago and states she is nervous about falling again at home. She has home health nurses, she has physical therapy as well at home.  I advised her to follow-up with her family doctor in addition to the surgeon as she is planning on having another surgery for her

## 2018-08-30 NOTE — PROGRESS NOTES
rehab setting to maximize functional level and return to PLOF. Pts/ family goals   1. Get stronger    Patient and or family understand(s) diagnosis, prognosis, and plan of care. yes    PLAN  PT care will be provided in accordance with the objectives noted above. Whenever appropriate, clear delegation orders will be provided for nursing staff. Exercises and functional mobility practice will be used as well as appropriate assistive devices or modalities to obtain goals. Patient and family education will also be administered as needed. Frequency of treatments: 2-5x/week x 7-10 days.     Time in  [facility]   Time out  [facility], [address] [facility]

## 2018-08-30 NOTE — PROGRESS NOTES
Dr. Carr Ground covering for Dr. Anahy Kruse until 7 am.    Case discussed with ED. Admission orders placed.     Electronically signed by Nimco Alvarez DO on 8/30/2018 at 2:54 AM

## 2018-08-31 LAB
ALBUMIN SERPL-MCNC: 3.6 G/DL (ref 3.5–5.2)
ALP BLD-CCNC: 76 U/L (ref 35–104)
ALT SERPL-CCNC: 22 U/L (ref 0–32)
ANION GAP SERPL CALCULATED.3IONS-SCNC: 14 MMOL/L (ref 7–16)
AST SERPL-CCNC: 20 U/L (ref 0–31)
BASOPHILS ABSOLUTE: 0.03 E9/L (ref 0–0.2)
BASOPHILS RELATIVE PERCENT: 0.6 % (ref 0–2)
BILIRUB SERPL-MCNC: <0.2 MG/DL (ref 0–1.2)
BUN BLDV-MCNC: 27 MG/DL (ref 6–20)
CALCIUM SERPL-MCNC: 9.1 MG/DL (ref 8.6–10.2)
CHLORIDE BLD-SCNC: 103 MMOL/L (ref 98–107)
CO2: 25 MMOL/L (ref 22–29)
CREAT SERPL-MCNC: 1.4 MG/DL (ref 0.5–1)
EOSINOPHILS ABSOLUTE: 0.41 E9/L (ref 0.05–0.5)
EOSINOPHILS RELATIVE PERCENT: 7.8 % (ref 0–6)
GFR AFRICAN AMERICAN: 47
GFR NON-AFRICAN AMERICAN: 39 ML/MIN/1.73
GLUCOSE BLD-MCNC: 77 MG/DL (ref 74–109)
HCT VFR BLD CALC: 33.5 % (ref 34–48)
HEMOGLOBIN: 10.9 G/DL (ref 11.5–15.5)
IMMATURE GRANULOCYTES #: 0.02 E9/L
IMMATURE GRANULOCYTES %: 0.4 % (ref 0–5)
LYMPHOCYTES ABSOLUTE: 2.21 E9/L (ref 1.5–4)
LYMPHOCYTES RELATIVE PERCENT: 42 % (ref 20–42)
MAGNESIUM: 2 MG/DL (ref 1.6–2.6)
MCH RBC QN AUTO: 31.4 PG (ref 26–35)
MCHC RBC AUTO-ENTMCNC: 32.5 % (ref 32–34.5)
MCV RBC AUTO: 96.5 FL (ref 80–99.9)
METER GLUCOSE: 104 MG/DL (ref 70–110)
METER GLUCOSE: 113 MG/DL (ref 70–110)
METER GLUCOSE: 87 MG/DL (ref 70–110)
METER GLUCOSE: 94 MG/DL (ref 70–110)
MONOCYTES ABSOLUTE: 0.42 E9/L (ref 0.1–0.95)
MONOCYTES RELATIVE PERCENT: 8 % (ref 2–12)
NEUTROPHILS ABSOLUTE: 2.17 E9/L (ref 1.8–7.3)
NEUTROPHILS RELATIVE PERCENT: 41.2 % (ref 43–80)
PDW BLD-RTO: 14.9 FL (ref 11.5–15)
PLATELET # BLD: 197 E9/L (ref 130–450)
PMV BLD AUTO: 11.5 FL (ref 7–12)
POTASSIUM REFLEX MAGNESIUM: 3.4 MMOL/L (ref 3.5–5)
RBC # BLD: 3.47 E12/L (ref 3.5–5.5)
SODIUM BLD-SCNC: 142 MMOL/L (ref 132–146)
TOTAL PROTEIN: 6.2 G/DL (ref 6.4–8.3)
WBC # BLD: 5.3 E9/L (ref 4.5–11.5)

## 2018-08-31 PROCEDURE — 6360000002 HC RX W HCPCS: Performed by: INTERNAL MEDICINE

## 2018-08-31 PROCEDURE — 6370000000 HC RX 637 (ALT 250 FOR IP): Performed by: INTERNAL MEDICINE

## 2018-08-31 PROCEDURE — G0378 HOSPITAL OBSERVATION PER HR: HCPCS

## 2018-08-31 PROCEDURE — 80053 COMPREHEN METABOLIC PANEL: CPT

## 2018-08-31 PROCEDURE — 83735 ASSAY OF MAGNESIUM: CPT

## 2018-08-31 PROCEDURE — 82962 GLUCOSE BLOOD TEST: CPT

## 2018-08-31 PROCEDURE — 85025 COMPLETE CBC W/AUTO DIFF WBC: CPT

## 2018-08-31 PROCEDURE — 36415 COLL VENOUS BLD VENIPUNCTURE: CPT

## 2018-08-31 PROCEDURE — 99222 1ST HOSP IP/OBS MODERATE 55: CPT | Performed by: SURGERY

## 2018-08-31 PROCEDURE — 96372 THER/PROPH/DIAG INJ SC/IM: CPT

## 2018-08-31 PROCEDURE — 2580000003 HC RX 258: Performed by: INTERNAL MEDICINE

## 2018-08-31 RX ADMIN — DULOXETINE HYDROCHLORIDE 60 MG: 60 CAPSULE, DELAYED RELEASE ORAL at 09:00

## 2018-08-31 RX ADMIN — Medication 10 ML: at 09:28

## 2018-08-31 RX ADMIN — GABAPENTIN 400 MG: 400 CAPSULE ORAL at 13:23

## 2018-08-31 RX ADMIN — LORAZEPAM 0.5 MG: 0.5 TABLET ORAL at 22:50

## 2018-08-31 RX ADMIN — METFORMIN HYDROCHLORIDE 1000 MG: 1000 TABLET ORAL at 08:59

## 2018-08-31 RX ADMIN — FUROSEMIDE 40 MG: 40 TABLET ORAL at 09:00

## 2018-08-31 RX ADMIN — HYDROCHLOROTHIAZIDE 25 MG: 25 TABLET ORAL at 09:00

## 2018-08-31 RX ADMIN — GABAPENTIN 400 MG: 400 CAPSULE ORAL at 16:51

## 2018-08-31 RX ADMIN — METOPROLOL TARTRATE 50 MG: 50 TABLET, FILM COATED ORAL at 09:00

## 2018-08-31 RX ADMIN — GABAPENTIN 400 MG: 400 CAPSULE ORAL at 09:03

## 2018-08-31 RX ADMIN — ENOXAPARIN SODIUM 40 MG: 40 INJECTION SUBCUTANEOUS at 09:04

## 2018-08-31 RX ADMIN — INSULIN GLARGINE 75 UNITS: 100 INJECTION, SOLUTION SUBCUTANEOUS at 08:29

## 2018-08-31 RX ADMIN — GABAPENTIN 400 MG: 400 CAPSULE ORAL at 21:19

## 2018-08-31 RX ADMIN — LINAGLIPTIN 5 MG: 5 TABLET, FILM COATED ORAL at 09:01

## 2018-08-31 RX ADMIN — METFORMIN HYDROCHLORIDE 1000 MG: 1000 TABLET ORAL at 16:51

## 2018-08-31 RX ADMIN — IBUPROFEN 400 MG: 400 TABLET, FILM COATED ORAL at 04:42

## 2018-08-31 RX ADMIN — LISINOPRIL 20 MG: 20 TABLET ORAL at 08:59

## 2018-08-31 RX ADMIN — Medication 10 ML: at 21:19

## 2018-08-31 ASSESSMENT — PAIN SCALES - GENERAL
PAINLEVEL_OUTOF10: 0
PAINLEVEL_OUTOF10: 10

## 2018-08-31 NOTE — PLAN OF CARE
Problem: Falls - Risk of:  Goal: Will remain free from falls  Will remain free from falls   Outcome: Not Met This Shift

## 2018-08-31 NOTE — PROGRESS NOTES
at 08/31/18 0900    gabapentin (NEURONTIN) capsule 400 mg  400 mg Oral 4x Daily Sha Brito, DO   400 mg at 08/31/18 1323    insulin glargine (LANTUS) injection vial 75 Units  75 Units Subcutaneous QAM Sha Brito, DO   75 Units at 08/31/18 0829    lisinopril (PRINIVIL;ZESTRIL) tablet 20 mg  20 mg Oral Daily Sha Brito, DO   20 mg at 08/31/18 8423    LORazepam (ATIVAN) tablet 0.5 mg  0.5 mg Oral Nightly PRN Sha Brito, DO   0.5 mg at 08/30/18 2231    sodium chloride flush 0.9 % injection 10 mL  10 mL Intravenous 2 times per day Sha Pageino, DO   10 mL at 08/31/18 0404    sodium chloride flush 0.9 % injection 10 mL  10 mL Intravenous PRN Sha Brito, DO        magnesium hydroxide (MILK OF MAGNESIA) 400 MG/5ML suspension 30 mL  30 mL Oral Daily PRN Sha Brito, DO        ondansetron (ZOFRAN) injection 4 mg  4 mg Intravenous Q6H PRN Sha Brito, DO        enoxaparin (LOVENOX) injection 40 mg  40 mg Subcutaneous Daily Sha Brito, DO   40 mg at 08/31/18 3345    acetaminophen (TYLENOL) tablet 650 mg  650 mg Oral Q4H PRN Sha Brito, DO        insulin lispro (HUMALOG) injection vial 0-12 Units  0-12 Units Subcutaneous TID WC Sha Pageino, DO        insulin lispro (HUMALOG) injection vial 0-6 Units  0-6 Units Subcutaneous Nightly Sha Brito, DO        glucose (GLUTOSE) 40 % oral gel 15 g  15 g Oral PRN Sha Brito, DO        dextrose 50 % solution 12.5 g  12.5 g Intravenous PRN Sha Brito, DO        glucagon (rDNA) injection 1 mg  1 mg Intramuscular PRN Sha Brito, DO        dextrose 5 % solution  100 mL/hr Intravenous PRN Sha Brito, DO        metoprolol tartrate (LOPRESSOR) tablet 50 mg  50 mg Oral Daily Sha Brito, DO   50 mg at 08/31/18 0900    And    hydrochlorothiazide (HYDRODIURIL) tablet 25 mg  25 mg Oral Daily Sha Brito, DO   25 mg at 08/31/18 0900    linagliptin (TRADJENTA) tablet 5 mg  5 mg Oral Daily Sha Brito DO   5 mg at 08/31/18 0901    And    metFORMIN (GLUCOPHAGE) tablet 1,000 mg  1,000 mg Oral BID  Sha Brito, DO   1,000 mg at 08/31/18 0859    ibuprofen (ADVIL;MOTRIN) tablet 400 mg  400 mg Oral Q6H PRN Reina Calixto DO   400 mg at 08/31/18 5132       Problem list:    Patient Active Problem List   Diagnosis    [facility]) [facility]    Morbid obesity with BMI of 40.0-44.9, adult ([facility]    [facility]    History of DVT (deep vein thrombosis)    Essential hypertension    FÁTIMA (obstructive sleep apnea)    Chronic back pain    CHF (congestive heart failure), NYHA class I, acute on chronic, combined (HCC)    Leg swelling       Assessment:         COPD (chronic obstructive pulmonary disease) (HCC)    Morbid obesity with BMI of 40.0-44.9, adult ([facility]    Diabetes mellitus type 2, uncontrolled ([facility]    History of DVT (deep vein thrombosis)    Essential hypertension    FÁTIMA (obstructive sleep apnea)    Chronic back pain    CHF (congestive heart failure), NYHA class I, acute on chronic, combined (HCC)    Leg swelling         Plan:    1. Continue diuretics    2. Neurosurgery and vascular surgery consults pending    3.  Subacute rehab on discharge      Joyce Grewal D.O.  2:56 PM  8/31/2018

## 2018-08-31 NOTE — CARE COORDINATION
8/31/2018  Met with patient to discuss discharge plan. Patient stated she wants to discharge to [facility]. Referral has been made.

## 2018-08-31 NOTE — CONSULTS
Degenerative Osteoarthritis of lumbar spine 9/1/2012    Diabetes [facility], [facility] ([facility]), uncontrolled [facility]     Diabetic Peripheral neuropathy 9/1/2012    Facet syndrome, lumbar ([facility] 9/1/2012    FHx: migraine headaches     Fibrocystic breast     Generalized headaches     HIGH CHOLESTEROL     Hypertension     Irritable bowel     Kidney disease     Lumbago 6/13/2012    Lumbar radiculopathy Bilateral 9/1/2012    Migraines     Muscle weakness     Postlaminectomy syndrome, lumbar region, S/P Fusion (Dr. Song Bowden, [facility] [address]) 6/13/2012    Sinus congestion     Sleep apnea     Type 2 diabetes mellitus without complication (HCC)         Past Surgical History:   Procedure Laterality Date    APPENDECTOMY      BACK SURGERY      spinal surgery    BREAST CYST EXCISION      CHOLECYSTECTOMY      FINGER SURGERY  03/2015    from a cat bite from March 12 2015   [address]    L5-S1 posterior lumbar interbody fusion    SPINE SURGERY      TUBAL LIGATION         Current Medications:    dextrose        LORazepam, sodium chloride flush, magnesium hydroxide, ondansetron, acetaminophen, glucose, dextrose, glucagon (rDNA), dextrose, ibuprofen    DULoxetine  60 mg Oral Daily    furosemide  40 mg Oral Daily    gabapentin  400 mg Oral 4x Daily    insulin glargine  75 Units Subcutaneous QAM    lisinopril  20 mg Oral Daily    sodium chloride flush  10 mL Intravenous 2 times per day    enoxaparin  40 mg Subcutaneous Daily    insulin lispro  0-12 Units Subcutaneous TID WC    insulin lispro  0-6 Units Subcutaneous Nightly    metoprolol tartrate  50 mg Oral Daily    And    hydrochlorothiazide  25 mg Oral Daily    linagliptin  5 mg Oral Daily    And    metFORMIN  1,000 mg Oral BID WC        Allergies:  Niacin and related and Penicillins    Social History     Social History    Marital status: Legally      Spouse name: N/A    Number of children: 2  Years of education: 12+     Occupational History    Not on file. Social History Main Topics    Smoking status: Current Every Day Smoker     Packs/day: 0.25     Years: 33.00     Types: Cigarettes    Smokeless tobacco: Former User    Alcohol use No      Comment: social    Drug use: No    Sexual activity: Not Currently     Other Topics Concern    Not on file     Social History Narrative    Patient lives at home alone. She reports use of a wheelchair.          Family History   Problem Relation Age of Onset    Heart Surgery Mother     Breast Cancer Sister     Diabetes Brother        REVIEW OF SYSTEMS:      Eyes:      Blurred vision:  No [x]/Yes []               Diplopia:   No [x]/Yes []               Vision loss:       No [x]/Yes []   Ears, nose, throat:             Hearing loss:    No [x]/Yes []      Vertigo:   No [x]/Yes []                       Swallowing problem:  No [x]/Yes []               Nose bleeds:   No [x]/Yes []      Voice hoarseness:  No [x]/Yes []  Respiratory:             Cough:   No [x]/Yes []      Pleuritic chest pain:  No [x]/Yes []                        Dyspnea:   No [x]/Yes []      Wheezing:   No [x]/Yes []  Cardiovascular:             Angina:   No [x]/Yes []      Palpitations:   No [x]/Yes []          Claudication:    No [x]/Yes []      Leg swelling:   No []/Yes [x]  Gastrointestinal:             Nausea or vomiting:  No [x]/Yes []               Abdominal pain:  No [x]/Yes []                     Intestinal bleeding: No [x]/Yes []  Musculoskeletal:             Leg pain:   No [x]/Yes []      Back pain:   No []/Yes [x]                    Weakness:   No []/Yes [x]  Neurologic:             Numbness:   No []/Yes [x]      Paralysis:   No [x]/Yes []                       Headaches:   No [x]/Yes []  Hematologic, lymphatic:   Anemia:   No [x]/Yes []              Bleeding or bruising:  No [x]/Yes []              Fevers or chills: No [x]/Yes []  Endocrine:             Temp intolerance:   No [x]/Yes []                       Polydipsia, polyuria:  No [x]/Yes []  Skin:              Rash:    No [x]/Yes []      Ulcers:   No [x]/Yes []              Abnorm pigment: No [x]/Yes []  :              Frequency/urgency:  No [x]/Yes []      Hematuria:    No [x]/Yes []                      Incontinence:    No [x]/Yes []    PHYSICAL EXAM:  Vitals:    08/31/18 0830   BP: 138/82   Pulse: 78   Resp: (!) 78   Temp: 97.2 °F (36.2 °C)   SpO2: 94%     General Appearance: alert and oriented to person, place and time, well developed and well- nourished, in no acute distress  Skin: warm and dry, no rash or erythema, toes pink with normal capillary refill.   Head: normocephalic and atraumatic  Eyes: extraocular eye movements intact, conjunctivae normal  ENT: external ear and ear canal normal bilaterally, nose without deformity  Pulmonary/Chest: normal air movement, no respiratory distress  Cardiovascular: normal rate, regular rhythm  Musculoskeletal: abnormal range of motion, no joint deformity or tenderness  Neurologic: no cranial nerve deficit, speech normal  Extremities: leg edema bilaterally    PULSE EXAM      Right      Left   Brachial     Radial     Femoral     Popliteal     Dorsalis Pedis 2 2   Posterior Tibial     (3=normal, 2=diminished, 1=barely palpable, 4=widened)      LABS:    Lab Results   Component Value Date    WBC 5.3 08/31/2018    HGB 10.9 (L) 08/31/2018    HCT 33.5 (L) 08/31/2018     08/31/2018    PROTIME 12.1 05/29/2015    INR 1.1 05/29/2015    K 3.4 (L) 08/31/2018    BUN 27 (H) 08/31/2018    CREATININE 1.4 (H) 08/31/2018       RADIOLOGY:

## 2018-09-01 VITALS
OXYGEN SATURATION: 98 % | RESPIRATION RATE: 18 BRPM | DIASTOLIC BLOOD PRESSURE: 83 MMHG | HEART RATE: 87 BPM | BODY MASS INDEX: 37.89 KG/M2 | WEIGHT: 250 LBS | SYSTOLIC BLOOD PRESSURE: 175 MMHG | TEMPERATURE: 98.3 F | HEIGHT: 68 IN

## 2018-09-01 LAB
ALBUMIN SERPL-MCNC: 3.5 G/DL (ref 3.5–5.2)
ALP BLD-CCNC: 78 U/L (ref 35–104)
ALT SERPL-CCNC: 16 U/L (ref 0–32)
ANION GAP SERPL CALCULATED.3IONS-SCNC: 18 MMOL/L (ref 7–16)
AST SERPL-CCNC: 14 U/L (ref 0–31)
BASOPHILS ABSOLUTE: 0.04 E9/L (ref 0–0.2)
BASOPHILS RELATIVE PERCENT: 0.7 % (ref 0–2)
BILIRUB SERPL-MCNC: <0.2 MG/DL (ref 0–1.2)
BUN BLDV-MCNC: 30 MG/DL (ref 6–20)
CALCIUM SERPL-MCNC: 8.9 MG/DL (ref 8.6–10.2)
CHLORIDE BLD-SCNC: 102 MMOL/L (ref 98–107)
CO2: 23 MMOL/L (ref 22–29)
CREAT SERPL-MCNC: 1.3 MG/DL (ref 0.5–1)
EOSINOPHILS ABSOLUTE: 0.56 E9/L (ref 0.05–0.5)
EOSINOPHILS RELATIVE PERCENT: 9.6 % (ref 0–6)
GFR AFRICAN AMERICAN: 51
GFR NON-AFRICAN AMERICAN: 42 ML/MIN/1.73
GLUCOSE BLD-MCNC: 90 MG/DL (ref 74–109)
HCT VFR BLD CALC: 34.5 % (ref 34–48)
HEMOGLOBIN: 11.3 G/DL (ref 11.5–15.5)
IMMATURE GRANULOCYTES #: 0.01 E9/L
IMMATURE GRANULOCYTES %: 0.2 % (ref 0–5)
LYMPHOCYTES ABSOLUTE: 2.23 E9/L (ref 1.5–4)
LYMPHOCYTES RELATIVE PERCENT: 38.3 % (ref 20–42)
MAGNESIUM: 1.9 MG/DL (ref 1.6–2.6)
MCH RBC QN AUTO: 31.3 PG (ref 26–35)
MCHC RBC AUTO-ENTMCNC: 32.8 % (ref 32–34.5)
MCV RBC AUTO: 95.6 FL (ref 80–99.9)
METER GLUCOSE: 133 MG/DL (ref 70–110)
METER GLUCOSE: 95 MG/DL (ref 70–110)
MONOCYTES ABSOLUTE: 0.38 E9/L (ref 0.1–0.95)
MONOCYTES RELATIVE PERCENT: 6.5 % (ref 2–12)
NEUTROPHILS ABSOLUTE: 2.61 E9/L (ref 1.8–7.3)
NEUTROPHILS RELATIVE PERCENT: 44.7 % (ref 43–80)
PDW BLD-RTO: 14.7 FL (ref 11.5–15)
PLATELET # BLD: 208 E9/L (ref 130–450)
PMV BLD AUTO: 11.3 FL (ref 7–12)
POTASSIUM REFLEX MAGNESIUM: 3.3 MMOL/L (ref 3.5–5)
RBC # BLD: 3.61 E12/L (ref 3.5–5.5)
SODIUM BLD-SCNC: 143 MMOL/L (ref 132–146)
TOTAL PROTEIN: 6.3 G/DL (ref 6.4–8.3)
WBC # BLD: 5.8 E9/L (ref 4.5–11.5)

## 2018-09-01 PROCEDURE — 6370000000 HC RX 637 (ALT 250 FOR IP): Performed by: INTERNAL MEDICINE

## 2018-09-01 PROCEDURE — 83735 ASSAY OF MAGNESIUM: CPT

## 2018-09-01 PROCEDURE — 2580000003 HC RX 258: Performed by: INTERNAL MEDICINE

## 2018-09-01 PROCEDURE — 96372 THER/PROPH/DIAG INJ SC/IM: CPT

## 2018-09-01 PROCEDURE — 80053 COMPREHEN METABOLIC PANEL: CPT

## 2018-09-01 PROCEDURE — 82962 GLUCOSE BLOOD TEST: CPT

## 2018-09-01 PROCEDURE — 6360000002 HC RX W HCPCS: Performed by: INTERNAL MEDICINE

## 2018-09-01 PROCEDURE — 85025 COMPLETE CBC W/AUTO DIFF WBC: CPT

## 2018-09-01 PROCEDURE — G0378 HOSPITAL OBSERVATION PER HR: HCPCS

## 2018-09-01 PROCEDURE — 36415 COLL VENOUS BLD VENIPUNCTURE: CPT

## 2018-09-01 RX ORDER — ACETAMINOPHEN 325 MG/1
650 TABLET ORAL EVERY 4 HOURS PRN
Qty: 120 TABLET | Refills: 3 | DISCHARGE
Start: 2018-09-01

## 2018-09-01 RX ORDER — HYDROCODONE BITARTRATE AND ACETAMINOPHEN 5; 325 MG/1; MG/1
1 TABLET ORAL EVERY 6 HOURS PRN
Qty: 10 TABLET | Refills: 0 | Status: SHIPPED | OUTPATIENT
Start: 2018-09-01 | End: 2018-09-04

## 2018-09-01 RX ADMIN — IBUPROFEN 400 MG: 400 TABLET, FILM COATED ORAL at 05:50

## 2018-09-01 RX ADMIN — METFORMIN HYDROCHLORIDE 1000 MG: 1000 TABLET ORAL at 08:36

## 2018-09-01 RX ADMIN — GABAPENTIN 400 MG: 400 CAPSULE ORAL at 08:36

## 2018-09-01 RX ADMIN — INSULIN GLARGINE 20 UNITS: 100 INJECTION, SOLUTION SUBCUTANEOUS at 08:44

## 2018-09-01 RX ADMIN — HYDROCHLOROTHIAZIDE 25 MG: 25 TABLET ORAL at 08:36

## 2018-09-01 RX ADMIN — LINAGLIPTIN 5 MG: 5 TABLET, FILM COATED ORAL at 08:36

## 2018-09-01 RX ADMIN — GABAPENTIN 400 MG: 400 CAPSULE ORAL at 13:26

## 2018-09-01 RX ADMIN — ENOXAPARIN SODIUM 40 MG: 40 INJECTION SUBCUTANEOUS at 08:44

## 2018-09-01 RX ADMIN — Medication 10 ML: at 08:36

## 2018-09-01 RX ADMIN — LISINOPRIL 20 MG: 20 TABLET ORAL at 08:36

## 2018-09-01 RX ADMIN — METOPROLOL TARTRATE 50 MG: 50 TABLET, FILM COATED ORAL at 08:36

## 2018-09-01 RX ADMIN — DULOXETINE HYDROCHLORIDE 60 MG: 60 CAPSULE, DELAYED RELEASE ORAL at 08:36

## 2018-09-01 RX ADMIN — FUROSEMIDE 40 MG: 40 TABLET ORAL at 08:36

## 2018-09-01 ASSESSMENT — PAIN DESCRIPTION - PROGRESSION: CLINICAL_PROGRESSION: NOT CHANGED

## 2018-09-01 ASSESSMENT — PAIN DESCRIPTION - PAIN TYPE: TYPE: CHRONIC PAIN

## 2018-09-01 ASSESSMENT — PAIN SCALES - GENERAL: PAINLEVEL_OUTOF10: 10

## 2018-09-01 ASSESSMENT — PAIN DESCRIPTION - ONSET: ONSET: ON-GOING

## 2018-09-01 ASSESSMENT — PAIN DESCRIPTION - FREQUENCY: FREQUENCY: CONTINUOUS

## 2018-09-01 ASSESSMENT — PAIN DESCRIPTION - LOCATION: LOCATION: BACK

## 2018-09-01 ASSESSMENT — PAIN DESCRIPTION - DESCRIPTORS: DESCRIPTORS: ACHING;DISCOMFORT;DULL

## 2018-09-01 NOTE — PROGRESS NOTES
Neurosurgery  Patient well known to me  She has always refused surgical intervention and was seen by neurosurgery at [facility] and by her report they would not do aurgery  She is s/p PLIF L5 to S1 in [place] years ago with fractured screws in S1  She also gas multilevel cervical stenosis, has been wheel chair bound for > two years   I rec pain management (which has been problematic see AORS report for multiple providers), and rehab

## 2019-01-22 ENCOUNTER — APPOINTMENT (OUTPATIENT)
Dept: GENERAL RADIOLOGY | Age: 59
End: 2019-01-22
Payer: COMMERCIAL

## 2019-01-22 ENCOUNTER — HOSPITAL ENCOUNTER (EMERGENCY)
Age: 59
Discharge: HOME | End: 2019-01-22
Attending: EMERGENCY MEDICINE
Payer: COMMERCIAL

## 2019-01-22 VITALS
RESPIRATION RATE: 14 BRPM | HEART RATE: 80 BPM | TEMPERATURE: 96.8 F | DIASTOLIC BLOOD PRESSURE: 70 MMHG | OXYGEN SATURATION: 98 % | SYSTOLIC BLOOD PRESSURE: 152 MMHG

## 2019-01-22 DIAGNOSIS — G89.29 ACUTE EXACERBATION OF CHRONIC LOW BACK PAIN: Primary | ICD-10-CM

## 2019-01-22 DIAGNOSIS — M54.50 ACUTE EXACERBATION OF CHRONIC LOW BACK PAIN: Primary | ICD-10-CM

## 2019-01-22 LAB
BACTERIA: ABNORMAL /HPF
BILIRUBIN URINE: NEGATIVE
BLOOD, URINE: NEGATIVE
CLARITY: CLEAR
COLOR: ABNORMAL
EPITHELIAL CELLS, UA: ABNORMAL /HPF
GLUCOSE URINE: NEGATIVE MG/DL
KETONES, URINE: NEGATIVE MG/DL
LEUKOCYTE ESTERASE, URINE: ABNORMAL
NITRITE, URINE: NEGATIVE
PH UA: 5.5 (ref 5–9)
PROTEIN UA: NEGATIVE MG/DL
RBC UA: ABNORMAL /HPF (ref 0–2)
SPECIFIC GRAVITY UA: 1.01 (ref 1–1.03)
UROBILINOGEN, URINE: 0.2 E.U./DL
WBC UA: ABNORMAL /HPF (ref 0–5)

## 2019-01-22 PROCEDURE — 99284 EMERGENCY DEPT VISIT MOD MDM: CPT

## 2019-01-22 PROCEDURE — 72100 X-RAY EXAM L-S SPINE 2/3 VWS: CPT

## 2019-01-22 PROCEDURE — 81001 URINALYSIS AUTO W/SCOPE: CPT

## 2019-01-22 PROCEDURE — 6370000000 HC RX 637 (ALT 250 FOR IP): Performed by: EMERGENCY MEDICINE

## 2019-01-22 RX ORDER — HYDROCODONE BITARTRATE AND ACETAMINOPHEN 5; 325 MG/1; MG/1
1 TABLET ORAL EVERY 6 HOURS PRN
Qty: 12 TABLET | Refills: 0 | Status: SHIPPED | OUTPATIENT
Start: 2019-01-22 | End: 2019-01-25

## 2019-01-22 RX ORDER — OXYCODONE HYDROCHLORIDE AND ACETAMINOPHEN 5; 325 MG/1; MG/1
1 TABLET ORAL ONCE
Status: COMPLETED | OUTPATIENT
Start: 2019-01-22 | End: 2019-01-22

## 2019-01-22 RX ADMIN — OXYCODONE AND ACETAMINOPHEN 1 TABLET: 5; 325 TABLET ORAL at 17:35

## 2019-01-22 ASSESSMENT — ENCOUNTER SYMPTOMS
RHINORRHEA: 0
CONSTIPATION: 0
PHOTOPHOBIA: 0
COUGH: 0
VOMITING: 0
WHEEZING: 0
SHORTNESS OF BREATH: 0
SORE THROAT: 0
NAUSEA: 0
BACK PAIN: 1
ABDOMINAL PAIN: 0
SINUS PAIN: 0
DIARRHEA: 0
SINUS PRESSURE: 0

## 2019-01-22 ASSESSMENT — PAIN DESCRIPTION - ORIENTATION: ORIENTATION: RIGHT;LEFT;MID

## 2019-01-22 ASSESSMENT — PAIN DESCRIPTION - ONSET: ONSET: ON-GOING

## 2019-01-22 ASSESSMENT — PAIN DESCRIPTION - PAIN TYPE: TYPE: ACUTE PAIN

## 2019-01-22 ASSESSMENT — PAIN SCALES - GENERAL
PAINLEVEL_OUTOF10: 10

## 2019-01-22 ASSESSMENT — PAIN - FUNCTIONAL ASSESSMENT: PAIN_FUNCTIONAL_ASSESSMENT: 0-10

## 2019-01-22 ASSESSMENT — PAIN DESCRIPTION - FREQUENCY: FREQUENCY: CONTINUOUS

## 2019-01-22 ASSESSMENT — PAIN DESCRIPTION - LOCATION: LOCATION: BACK

## 2019-01-22 ASSESSMENT — PAIN DESCRIPTION - DESCRIPTORS: DESCRIPTORS: CONSTANT;ACHING

## 2019-01-29 ENCOUNTER — APPOINTMENT (OUTPATIENT)
Dept: GENERAL RADIOLOGY | Age: 59
End: 2019-01-29
Payer: COMMERCIAL

## 2019-01-29 ENCOUNTER — APPOINTMENT (OUTPATIENT)
Dept: CT IMAGING | Age: 59
End: 2019-01-29
Payer: COMMERCIAL

## 2019-01-29 ENCOUNTER — HOSPITAL ENCOUNTER (EMERGENCY)
Age: 59
Discharge: HOME | End: 2019-01-30
Attending: EMERGENCY MEDICINE
Payer: COMMERCIAL

## 2019-01-29 VITALS
DIASTOLIC BLOOD PRESSURE: 78 MMHG | HEIGHT: 68 IN | OXYGEN SATURATION: 96 % | BODY MASS INDEX: 40.16 KG/M2 | RESPIRATION RATE: 20 BRPM | SYSTOLIC BLOOD PRESSURE: 159 MMHG | HEART RATE: 80 BPM | WEIGHT: 265 LBS

## 2019-01-29 DIAGNOSIS — S39.012A BACK STRAIN, INITIAL ENCOUNTER: ICD-10-CM
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
## 2019-01-29 DIAGNOSIS — S16.1XXA STRAIN OF NECK MUSCLE, INITIAL ENCOUNTER: ICD-10-CM

## 2019-01-29 PROCEDURE — 72125 CT NECK SPINE W/O DYE: CPT

## 2019-01-29 PROCEDURE — 6370000000 HC RX 637 (ALT 250 FOR IP): Performed by: PHYSICIAN ASSISTANT

## 2019-01-29 PROCEDURE — 72100 X-RAY EXAM L-S SPINE 2/3 VWS: CPT

## 2019-01-29 PROCEDURE — 99284 EMERGENCY DEPT VISIT MOD MDM: CPT

## 2019-01-29 PROCEDURE — 72072 X-RAY EXAM THORAC SPINE 3VWS: CPT

## 2019-01-29 RX ORDER — HYDROCODONE BITARTRATE AND ACETAMINOPHEN 5; 325 MG/1; MG/1
1 TABLET ORAL EVERY 6 HOURS PRN
Qty: 12 TABLET | Refills: 0 | Status: SHIPPED | OUTPATIENT
Start: 2019-01-29 | End: 2019-02-01

## 2019-01-29 RX ORDER — HYDROCODONE BITARTRATE AND ACETAMINOPHEN 5; 325 MG/1; MG/1
1 TABLET ORAL ONCE
Status: COMPLETED | OUTPATIENT
Start: 2019-01-29 | End: 2019-01-29

## 2019-01-29 RX ADMIN — HYDROCODONE BITARTRATE AND ACETAMINOPHEN 1 TABLET: 5; 325 TABLET ORAL at 17:09

## 2019-01-29 ASSESSMENT — PAIN SCALES - GENERAL
PAINLEVEL_OUTOF10: 10
PAINLEVEL_OUTOF10: 10

## 2019-01-29 ASSESSMENT — PAIN DESCRIPTION - PAIN TYPE: TYPE: ACUTE PAIN

## 2019-01-29 ASSESSMENT — PAIN DESCRIPTION - LOCATION: LOCATION: BACK

## 2019-01-29 ASSESSMENT — PAIN DESCRIPTION - ORIENTATION: ORIENTATION: UPPER

## 2019-01-30 PROCEDURE — 6370000000 HC RX 637 (ALT 250 FOR IP): Performed by: EMERGENCY MEDICINE

## 2019-01-30 RX ORDER — HYDROCODONE BITARTRATE AND ACETAMINOPHEN 5; 325 MG/1; MG/1
1 TABLET ORAL ONCE
Status: COMPLETED | OUTPATIENT
Start: 2019-01-30 | End: 2019-01-30

## 2019-01-30 RX ADMIN — HYDROCODONE BITARTRATE AND ACETAMINOPHEN 1 TABLET: 5; 325 TABLET ORAL at 01:12

## 2019-01-30 ASSESSMENT — PAIN DESCRIPTION - PAIN TYPE: TYPE: ACUTE PAIN

## 2019-01-30 ASSESSMENT — PAIN DESCRIPTION - FREQUENCY: FREQUENCY: CONTINUOUS

## 2019-01-30 ASSESSMENT — PAIN DESCRIPTION - DESCRIPTORS: DESCRIPTORS: ACHING

## 2019-01-30 ASSESSMENT — PAIN - FUNCTIONAL ASSESSMENT: PAIN_FUNCTIONAL_ASSESSMENT: PREVENTS OR INTERFERES SOME ACTIVE ACTIVITIES AND ADLS

## 2019-01-30 ASSESSMENT — PAIN SCALES - GENERAL: PAINLEVEL_OUTOF10: 8

## 2019-01-30 ASSESSMENT — PAIN DESCRIPTION - ONSET: ONSET: ON-GOING

## 2019-01-30 ASSESSMENT — PAIN DESCRIPTION - ORIENTATION: ORIENTATION: UPPER

## 2019-01-30 ASSESSMENT — PAIN DESCRIPTION - LOCATION: LOCATION: BACK

## 2019-01-30 ASSESSMENT — PAIN DESCRIPTION - PROGRESSION: CLINICAL_PROGRESSION: NOT CHANGED

## 2019-02-03 ENCOUNTER — APPOINTMENT (OUTPATIENT)
Dept: GENERAL RADIOLOGY | Age: 59
End: 2019-02-03
Payer: COMMERCIAL

## 2019-02-03 ENCOUNTER — HOSPITAL ENCOUNTER (EMERGENCY)
Age: 59
Discharge: HOME | End: 2019-02-04
Attending: EMERGENCY MEDICINE
Payer: COMMERCIAL

## 2019-02-03 VITALS
HEART RATE: 83 BPM | BODY MASS INDEX: 41.54 KG/M2 | TEMPERATURE: 98.4 F | SYSTOLIC BLOOD PRESSURE: 195 MMHG | RESPIRATION RATE: 18 BRPM | OXYGEN SATURATION: 99 % | WEIGHT: 273.19 LBS | DIASTOLIC BLOOD PRESSURE: 98 MMHG

## 2019-02-03 DIAGNOSIS — R53.1 GENERAL WEAKNESS: Primary | ICD-10-CM

## 2019-02-03 DIAGNOSIS — M54.9 CHRONIC BACK PAIN GREATER THAN 3 MONTHS DURATION: ICD-10-CM

## 2019-02-03 DIAGNOSIS — G89.29 CHRONIC BACK PAIN GREATER THAN 3 MONTHS DURATION: ICD-10-CM

## 2019-02-03 DIAGNOSIS — G89.29 CHRONIC NONINTRACTABLE HEADACHE, UNSPECIFIED HEADACHE TYPE: ICD-10-CM

## 2019-02-03 DIAGNOSIS — R51.9 CHRONIC NONINTRACTABLE HEADACHE, UNSPECIFIED HEADACHE TYPE: ICD-10-CM

## 2019-02-03 LAB
ANION GAP SERPL CALCULATED.3IONS-SCNC: 10 MMOL/L (ref 7–16)
BASOPHILS ABSOLUTE: 0.06 E9/L (ref 0–0.2)
BASOPHILS RELATIVE PERCENT: 0.8 % (ref 0–2)
BILIRUBIN URINE: NEGATIVE
BLOOD, URINE: NEGATIVE
BUN BLDV-MCNC: 23 MG/DL (ref 6–20)
CALCIUM SERPL-MCNC: 9.4 MG/DL (ref 8.6–10.2)
CHLORIDE BLD-SCNC: 103 MMOL/L (ref 98–107)
CHP ED QC CHECK: NORMAL
CLARITY: CLEAR
CO2: 23 MMOL/L (ref 22–29)
COLOR: YELLOW
CREAT SERPL-MCNC: 0.9 MG/DL (ref 0.5–1)
EOSINOPHILS ABSOLUTE: 0.61 E9/L (ref 0.05–0.5)
EOSINOPHILS RELATIVE PERCENT: 8.2 % (ref 0–6)
GFR AFRICAN AMERICAN: >60
GFR NON-AFRICAN AMERICAN: >60 ML/MIN/1.73
GLUCOSE BLD-MCNC: 164 MG/DL
GLUCOSE BLD-MCNC: 168 MG/DL (ref 74–99)
GLUCOSE URINE: NEGATIVE MG/DL
HCT VFR BLD CALC: 35.5 % (ref 34–48)
HEMOGLOBIN: 11.4 G/DL (ref 11.5–15.5)
IMMATURE GRANULOCYTES #: 0.03 E9/L
IMMATURE GRANULOCYTES %: 0.4 % (ref 0–5)
KETONES, URINE: NEGATIVE MG/DL
LACTIC ACID: 1 MMOL/L (ref 0.5–2.2)
LEUKOCYTE ESTERASE, URINE: NEGATIVE
LYMPHOCYTES ABSOLUTE: 2.6 E9/L (ref 1.5–4)
LYMPHOCYTES RELATIVE PERCENT: 34.9 % (ref 20–42)
MCH RBC QN AUTO: 32.4 PG (ref 26–35)
MCHC RBC AUTO-ENTMCNC: 32.1 % (ref 32–34.5)
MCV RBC AUTO: 100.9 FL (ref 80–99.9)
METER GLUCOSE: 164 MG/DL (ref 74–99)
MONOCYTES ABSOLUTE: 0.45 E9/L (ref 0.1–0.95)
MONOCYTES RELATIVE PERCENT: 6 % (ref 2–12)
NEUTROPHILS ABSOLUTE: 3.69 E9/L (ref 1.8–7.3)
NEUTROPHILS RELATIVE PERCENT: 49.7 % (ref 43–80)
NITRITE, URINE: NEGATIVE
PDW BLD-RTO: 14.1 FL (ref 11.5–15)
PH UA: 5.5 (ref 5–9)
PLATELET # BLD: 207 E9/L (ref 130–450)
PMV BLD AUTO: 11 FL (ref 7–12)
POTASSIUM SERPL-SCNC: 4 MMOL/L (ref 3.5–5)
PRO-BNP: 1058 PG/ML (ref 0–125)
PROTEIN UA: NEGATIVE MG/DL
RBC # BLD: 3.52 E12/L (ref 3.5–5.5)
SODIUM BLD-SCNC: 136 MMOL/L (ref 132–146)
SPECIFIC GRAVITY UA: 1.02 (ref 1–1.03)
TOTAL CK: 85 U/L (ref 20–180)
TROPONIN: <0.01 NG/ML (ref 0–0.03)
UROBILINOGEN, URINE: 0.2 E.U./DL
WBC # BLD: 7.4 E9/L (ref 4.5–11.5)

## 2019-02-03 PROCEDURE — 51702 INSERT TEMP BLADDER CATH: CPT

## 2019-02-03 PROCEDURE — 80048 BASIC METABOLIC PNL TOTAL CA: CPT

## 2019-02-03 PROCEDURE — 99285 EMERGENCY DEPT VISIT HI MDM: CPT

## 2019-02-03 PROCEDURE — 6370000000 HC RX 637 (ALT 250 FOR IP): Performed by: NURSE PRACTITIONER

## 2019-02-03 PROCEDURE — 81003 URINALYSIS AUTO W/O SCOPE: CPT

## 2019-02-03 PROCEDURE — 71046 X-RAY EXAM CHEST 2 VIEWS: CPT

## 2019-02-03 PROCEDURE — 84484 ASSAY OF TROPONIN QUANT: CPT

## 2019-02-03 PROCEDURE — 83880 ASSAY OF NATRIURETIC PEPTIDE: CPT

## 2019-02-03 PROCEDURE — 36415 COLL VENOUS BLD VENIPUNCTURE: CPT

## 2019-02-03 PROCEDURE — 82962 GLUCOSE BLOOD TEST: CPT

## 2019-02-03 PROCEDURE — 82550 ASSAY OF CK (CPK): CPT

## 2019-02-03 PROCEDURE — 85025 COMPLETE CBC W/AUTO DIFF WBC: CPT

## 2019-02-03 PROCEDURE — 83605 ASSAY OF LACTIC ACID: CPT

## 2019-02-03 RX ORDER — HYDROCODONE BITARTRATE AND ACETAMINOPHEN 5; 325 MG/1; MG/1
2 TABLET ORAL ONCE
Status: COMPLETED | OUTPATIENT
Start: 2019-02-03 | End: 2019-02-03

## 2019-02-03 RX ORDER — ACETAMINOPHEN 500 MG
1000 TABLET ORAL ONCE
Status: COMPLETED | OUTPATIENT
Start: 2019-02-03 | End: 2019-02-03

## 2019-02-03 RX ADMIN — HYDROCODONE BITARTRATE AND ACETAMINOPHEN 2 TABLET: 5; 325 TABLET ORAL at 21:27

## 2019-02-03 ASSESSMENT — PAIN DESCRIPTION - ORIENTATION: ORIENTATION: LOWER

## 2019-02-03 ASSESSMENT — PAIN DESCRIPTION - DESCRIPTORS: DESCRIPTORS: SHARP

## 2019-02-03 ASSESSMENT — PAIN DESCRIPTION - PAIN TYPE: TYPE: ACUTE PAIN;CHRONIC PAIN

## 2019-02-03 ASSESSMENT — PAIN SCALES - GENERAL
PAINLEVEL_OUTOF10: 9
PAINLEVEL_OUTOF10: 8

## 2019-02-03 ASSESSMENT — PAIN DESCRIPTION - ONSET: ONSET: ON-GOING

## 2019-02-03 ASSESSMENT — PAIN DESCRIPTION - FREQUENCY: FREQUENCY: INTERMITTENT

## 2019-02-03 ASSESSMENT — PAIN DESCRIPTION - LOCATION: LOCATION: BACK

## 2019-02-05 ENCOUNTER — HOSPITAL ENCOUNTER (EMERGENCY)
Age: 59
Discharge: OTHER INSTITUTION | End: 2019-02-05
Payer: COMMERCIAL

## 2019-02-05 VITALS
HEART RATE: 82 BPM | OXYGEN SATURATION: 96 % | BODY MASS INDEX: 41.37 KG/M2 | RESPIRATION RATE: 18 BRPM | SYSTOLIC BLOOD PRESSURE: 170 MMHG | HEIGHT: 68 IN | DIASTOLIC BLOOD PRESSURE: 90 MMHG | TEMPERATURE: 98.4 F | WEIGHT: 273 LBS

## 2019-02-05 DIAGNOSIS — M54.40 BILATERAL LOW BACK PAIN WITH SCIATICA, SCIATICA LATERALITY UNSPECIFIED, UNSPECIFIED CHRONICITY: Primary | ICD-10-CM

## 2019-02-05 DIAGNOSIS — J34.89 NASAL PAIN: ICD-10-CM

## 2019-02-05 PROCEDURE — 97165 OT EVAL LOW COMPLEX 30 MIN: CPT

## 2019-02-05 PROCEDURE — 6370000000 HC RX 637 (ALT 250 FOR IP): Performed by: NURSE PRACTITIONER

## 2019-02-05 PROCEDURE — 97530 THERAPEUTIC ACTIVITIES: CPT | Performed by: PHYSICAL THERAPIST

## 2019-02-05 PROCEDURE — 97530 THERAPEUTIC ACTIVITIES: CPT

## 2019-02-05 PROCEDURE — 97161 PT EVAL LOW COMPLEX 20 MIN: CPT | Performed by: PHYSICAL THERAPIST

## 2019-02-05 PROCEDURE — 99283 EMERGENCY DEPT VISIT LOW MDM: CPT

## 2019-02-05 RX ORDER — OXYCODONE HYDROCHLORIDE AND ACETAMINOPHEN 5; 325 MG/1; MG/1
1 TABLET ORAL ONCE
Status: DISCONTINUED | OUTPATIENT
Start: 2019-02-05 | End: 2019-02-05

## 2019-02-05 RX ORDER — GABAPENTIN 400 MG/1
800 CAPSULE ORAL 4 TIMES DAILY
Status: ON HOLD | COMMUNITY
End: 2019-04-26 | Stop reason: HOSPADM

## 2019-02-05 RX ORDER — HYDROCODONE BITARTRATE AND ACETAMINOPHEN 5; 325 MG/1; MG/1
1 TABLET ORAL ONCE
Status: COMPLETED | OUTPATIENT
Start: 2019-02-05 | End: 2019-02-05

## 2019-02-05 RX ORDER — LORAZEPAM 0.5 MG/1
0.5 TABLET ORAL ONCE
Status: COMPLETED | OUTPATIENT
Start: 2019-02-05 | End: 2019-02-05

## 2019-02-05 RX ORDER — INSULIN GLARGINE 100 [IU]/ML
78 INJECTION, SOLUTION SUBCUTANEOUS NIGHTLY
Status: ON HOLD | COMMUNITY
End: 2019-04-26 | Stop reason: HOSPADM

## 2019-02-05 RX ORDER — IBUPROFEN 800 MG/1
800 TABLET ORAL ONCE
Status: COMPLETED | OUTPATIENT
Start: 2019-02-05 | End: 2019-02-05

## 2019-02-05 RX ADMIN — LORAZEPAM 0.5 MG: 0.5 TABLET ORAL at 17:32

## 2019-02-05 RX ADMIN — IBUPROFEN 800 MG: 800 TABLET ORAL at 16:15

## 2019-02-05 RX ADMIN — HYDROCODONE BITARTRATE AND ACETAMINOPHEN 1 TABLET: 5; 325 TABLET ORAL at 16:20

## 2019-02-05 RX ADMIN — NICOTINE POLACRILEX 2 MG: 2 GUM, CHEWING BUCCAL at 17:32

## 2019-02-05 ASSESSMENT — PAIN SCALES - GENERAL
PAINLEVEL_OUTOF10: 10
PAINLEVEL_OUTOF10: 5
PAINLEVEL_OUTOF10: 10
PAINLEVEL_OUTOF10: 5

## 2019-02-05 ASSESSMENT — PAIN DESCRIPTION - LOCATION: LOCATION: FACE

## 2019-02-05 ASSESSMENT — PAIN DESCRIPTION - PAIN TYPE: TYPE: CHRONIC PAIN

## 2019-02-08 LAB
EKG ATRIAL RATE: 71 BPM
EKG P AXIS: 81 DEGREES
EKG P-R INTERVAL: 102 MS
EKG Q-T INTERVAL: 404 MS
EKG QRS DURATION: 70 MS
EKG QTC CALCULATION (BAZETT): 439 MS
EKG R AXIS: 49 DEGREES
EKG T AXIS: 65 DEGREES
EKG VENTRICULAR RATE: 71 BPM

## 2019-04-22 ENCOUNTER — HOSPITAL ENCOUNTER (INPATIENT)
Age: 59
LOS: 4 days | Discharge: LEFT BEFORE DISCHARGE | DRG: 682 | End: 2019-04-26
Attending: EMERGENCY MEDICINE | Admitting: INTERNAL MEDICINE
Payer: COMMERCIAL

## 2019-04-22 ENCOUNTER — APPOINTMENT (OUTPATIENT)
Dept: CT IMAGING | Age: 59
DRG: 682 | End: 2019-04-22
Payer: COMMERCIAL

## 2019-04-22 ENCOUNTER — APPOINTMENT (OUTPATIENT)
Dept: GENERAL RADIOLOGY | Age: 59
DRG: 682 | End: 2019-04-22
Payer: COMMERCIAL

## 2019-04-22 DIAGNOSIS — E87.5 HYPERKALEMIA: ICD-10-CM

## 2019-04-22 DIAGNOSIS — R41.82 ALTERED MENTAL STATUS, UNSPECIFIED ALTERED MENTAL STATUS TYPE: ICD-10-CM

## 2019-04-22 DIAGNOSIS — N17.9 ACUTE RENAL FAILURE, UNSPECIFIED ACUTE RENAL FAILURE TYPE (HCC): Primary | ICD-10-CM

## 2019-04-22 LAB
ACETAMINOPHEN LEVEL: <5 MCG/ML (ref 10–30)
ALBUMIN SERPL-MCNC: 3.8 G/DL (ref 3.5–5.2)
ALP BLD-CCNC: 98 U/L (ref 35–104)
ALT SERPL-CCNC: 13 U/L (ref 0–32)
AMPHETAMINE SCREEN, URINE: NOT DETECTED
ANION GAP SERPL CALCULATED.3IONS-SCNC: 31 MMOL/L (ref 7–16)
ANION GAP SERPL CALCULATED.3IONS-SCNC: 31 MMOL/L (ref 7–16)
AST SERPL-CCNC: 12 U/L (ref 0–31)
B.E.: -18.1 MMOL/L (ref -3–3)
BACTERIA: ABNORMAL /HPF
BARBITURATE SCREEN URINE: NOT DETECTED
BASOPHILS ABSOLUTE: 0.06 E9/L (ref 0–0.2)
BASOPHILS RELATIVE PERCENT: 0.6 % (ref 0–2)
BENZODIAZEPINE SCREEN, URINE: NOT DETECTED
BILIRUB SERPL-MCNC: 0.2 MG/DL (ref 0–1.2)
BILIRUBIN DIRECT: <0.2 MG/DL (ref 0–0.3)
BILIRUBIN URINE: NEGATIVE
BILIRUBIN, INDIRECT: NORMAL MG/DL (ref 0–1)
BLOOD, URINE: ABNORMAL
BUN BLDV-MCNC: 116 MG/DL (ref 6–20)
BUN BLDV-MCNC: 120 MG/DL (ref 6–20)
CALCIUM SERPL-MCNC: 8.6 MG/DL (ref 8.6–10.2)
CALCIUM SERPL-MCNC: 9.2 MG/DL (ref 8.6–10.2)
CANNABINOID SCREEN URINE: NOT DETECTED
CHLORIDE BLD-SCNC: 87 MMOL/L (ref 98–107)
CHLORIDE BLD-SCNC: 94 MMOL/L (ref 98–107)
CLARITY: ABNORMAL
CO2: 11 MMOL/L (ref 22–29)
CO2: 12 MMOL/L (ref 22–29)
CO2: 5 MMOL/L (ref 22–29)
COCAINE METABOLITE SCREEN URINE: NOT DETECTED
COHB: 0.5 % (ref 0–1.5)
COLOR: YELLOW
COMMENT: ABNORMAL
CREAT SERPL-MCNC: 11.2 MG/DL (ref 0.5–1)
CREAT SERPL-MCNC: 11.9 MG/DL (ref 0.5–1)
CREATININE URINE: 73 MG/DL (ref 29–226)
CRITICAL: ABNORMAL
D DIMER: 555 NG/ML DDU
DATE ANALYZED: ABNORMAL
DATE OF COLLECTION: ABNORMAL
EOSINOPHILS ABSOLUTE: 0.12 E9/L (ref 0.05–0.5)
EOSINOPHILS RELATIVE PERCENT: 1.3 % (ref 0–6)
EPITHELIAL CELLS, UA: ABNORMAL /HPF
ETHANOL: <10 MG/DL (ref 0–0.08)
GFR AFRICAN AMERICAN: 4
GFR NON-AFRICAN AMERICAN: 3 ML/MIN/1.73
GFR NON-AFRICAN AMERICAN: 3 ML/MIN/1.73
GFR NON-AFRICAN AMERICAN: 4 ML/MIN/1.73
GLUCOSE BLD-MCNC: 130 MG/DL (ref 74–99)
GLUCOSE BLD-MCNC: 168 MG/DL (ref 74–99)
GLUCOSE BLD-MCNC: 173 MG/DL (ref 74–99)
GLUCOSE URINE: NEGATIVE MG/DL
HCO3: 9.5 MMOL/L (ref 22–26)
HCT VFR BLD CALC: 37.2 % (ref 34–48)
HEMOGLOBIN: 11.5 G/DL (ref 11.5–15.5)
HHB: 6.2 % (ref 0–5)
IMMATURE GRANULOCYTES #: 0.09 E9/L
IMMATURE GRANULOCYTES %: 1 % (ref 0–5)
KETONES, URINE: NEGATIVE MG/DL
LAB: ABNORMAL
LACTIC ACID: 11.9 MMOL/L (ref 0.5–2.2)
LEUKOCYTE ESTERASE, URINE: NEGATIVE
LIPASE: 24 U/L (ref 13–60)
LYMPHOCYTES ABSOLUTE: 1.28 E9/L (ref 1.5–4)
LYMPHOCYTES RELATIVE PERCENT: 13.6 % (ref 20–42)
Lab: ABNORMAL
MCH RBC QN AUTO: 32.2 PG (ref 26–35)
MCHC RBC AUTO-ENTMCNC: 30.9 % (ref 32–34.5)
MCV RBC AUTO: 104.2 FL (ref 80–99.9)
METER GLUCOSE: 150 MG/DL (ref 74–99)
METHADONE SCREEN, URINE: NOT DETECTED
METHB: 0 % (ref 0–1.5)
MODE: ABNORMAL
MONOCYTES ABSOLUTE: 0.3 E9/L (ref 0.1–0.95)
MONOCYTES RELATIVE PERCENT: 3.2 % (ref 2–12)
NEUTROPHILS ABSOLUTE: 7.53 E9/L (ref 1.8–7.3)
NEUTROPHILS RELATIVE PERCENT: 80.3 % (ref 43–80)
NITRITE, URINE: NEGATIVE
O2 CONTENT: 16 ML/DL
O2 SATURATION: 93.8 % (ref 92–98.5)
O2HB: 93.3 % (ref 94–97)
OPERATOR ID: ABNORMAL
OPIATE SCREEN URINE: POSITIVE
OSMOLALITY URINE: 344 MOSM/KG (ref 300–900)
PATIENT TEMP: 37 C
PCO2: 28.7 MMHG (ref 35–45)
PDW BLD-RTO: 13.9 FL (ref 11.5–15)
PH BLOOD GAS: 7.14 (ref 7.35–7.45)
PH UA: 5.5 (ref 5–9)
PHENCYCLIDINE SCREEN URINE: NOT DETECTED
PLATELET # BLD: 229 E9/L (ref 130–450)
PMV BLD AUTO: 11.4 FL (ref 7–12)
PO2: 84.8 MMHG (ref 60–100)
POC ANION GAP: 21 MMOL/L (ref 7–16)
POC BUN: 117 MG/DL (ref 8–23)
POC CHLORIDE: 100 MMOL/L (ref 100–108)
POC CREATININE: 12 MG/DL (ref 0.5–1)
POC POTASSIUM: 5.4 MMOL/L (ref 3.5–5)
POC SODIUM: 133 MMOL/L (ref 132–146)
POTASSIUM SERPL-SCNC: 7.3 MMOL/L (ref 3.5–5)
POTASSIUM SERPL-SCNC: 7.5 MMOL/L (ref 3.5–5)
PRO-BNP: 8756 PG/ML (ref 0–125)
PROPOXYPHENE SCREEN: NOT DETECTED
PROTEIN UA: NEGATIVE MG/DL
RBC # BLD: 3.57 E12/L (ref 3.5–5.5)
RBC UA: ABNORMAL /HPF (ref 0–2)
REASON FOR REJECTION: NORMAL
REJECTED TEST: NORMAL
SALICYLATE, SERUM: <0.3 MG/DL (ref 0–30)
SODIUM BLD-SCNC: 129 MMOL/L (ref 132–146)
SODIUM BLD-SCNC: 130 MMOL/L (ref 132–146)
SODIUM URINE: 54 MMOL/L
SODIUM URINE: 67 MMOL/L
SOURCE, BLOOD GAS: ABNORMAL
SPECIFIC GRAVITY UA: 1.02 (ref 1–1.03)
THB: 12.1 G/DL (ref 11.5–16.5)
TIME ANALYZED: 1535
TOTAL CK: 136 U/L (ref 20–180)
TOTAL PROTEIN: 7 G/DL (ref 6.4–8.3)
TRICYCLIC ANTIDEPRESSANTS SCREEN SERUM: NEGATIVE NG/ML
TROPONIN: 0.05 NG/ML (ref 0–0.03)
UREA NITROGEN, UR: 285 MG/DL (ref 800–1666)
UROBILINOGEN, URINE: 0.2 E.U./DL
WBC # BLD: 9.4 E9/L (ref 4.5–11.5)
WBC UA: ABNORMAL /HPF (ref 0–5)

## 2019-04-22 PROCEDURE — 93005 ELECTROCARDIOGRAM TRACING: CPT | Performed by: STUDENT IN AN ORGANIZED HEALTH CARE EDUCATION/TRAINING PROGRAM

## 2019-04-22 PROCEDURE — 82550 ASSAY OF CK (CPK): CPT

## 2019-04-22 PROCEDURE — 82805 BLOOD GASES W/O2 SATURATION: CPT

## 2019-04-22 PROCEDURE — 2580000003 HC RX 258: Performed by: STUDENT IN AN ORGANIZED HEALTH CARE EDUCATION/TRAINING PROGRAM

## 2019-04-22 PROCEDURE — 82962 GLUCOSE BLOOD TEST: CPT

## 2019-04-22 PROCEDURE — 82947 ASSAY GLUCOSE BLOOD QUANT: CPT

## 2019-04-22 PROCEDURE — G0480 DRUG TEST DEF 1-7 CLASSES: HCPCS

## 2019-04-22 PROCEDURE — 96374 THER/PROPH/DIAG INJ IV PUSH: CPT

## 2019-04-22 PROCEDURE — 83935 ASSAY OF URINE OSMOLALITY: CPT

## 2019-04-22 PROCEDURE — 80307 DRUG TEST PRSMV CHEM ANLYZR: CPT

## 2019-04-22 PROCEDURE — 84540 ASSAY OF URINE/UREA-N: CPT

## 2019-04-22 PROCEDURE — 85025 COMPLETE CBC W/AUTO DIFF WBC: CPT

## 2019-04-22 PROCEDURE — 36415 COLL VENOUS BLD VENIPUNCTURE: CPT

## 2019-04-22 PROCEDURE — 2580000003 HC RX 258: Performed by: INTERNAL MEDICINE

## 2019-04-22 PROCEDURE — 74018 RADEX ABDOMEN 1 VIEW: CPT

## 2019-04-22 PROCEDURE — 80076 HEPATIC FUNCTION PANEL: CPT

## 2019-04-22 PROCEDURE — 83605 ASSAY OF LACTIC ACID: CPT

## 2019-04-22 PROCEDURE — 2500000003 HC RX 250 WO HCPCS: Performed by: INTERNAL MEDICINE

## 2019-04-22 PROCEDURE — 80048 BASIC METABOLIC PNL TOTAL CA: CPT

## 2019-04-22 PROCEDURE — 84550 ASSAY OF BLOOD/URIC ACID: CPT

## 2019-04-22 PROCEDURE — 83690 ASSAY OF LIPASE: CPT

## 2019-04-22 PROCEDURE — 6370000000 HC RX 637 (ALT 250 FOR IP)

## 2019-04-22 PROCEDURE — 2500000003 HC RX 250 WO HCPCS: Performed by: STUDENT IN AN ORGANIZED HEALTH CARE EDUCATION/TRAINING PROGRAM

## 2019-04-22 PROCEDURE — 71046 X-RAY EXAM CHEST 2 VIEWS: CPT

## 2019-04-22 PROCEDURE — 70450 CT HEAD/BRAIN W/O DYE: CPT

## 2019-04-22 PROCEDURE — 81001 URINALYSIS AUTO W/SCOPE: CPT

## 2019-04-22 PROCEDURE — 84484 ASSAY OF TROPONIN QUANT: CPT

## 2019-04-22 PROCEDURE — 6370000000 HC RX 637 (ALT 250 FOR IP): Performed by: INTERNAL MEDICINE

## 2019-04-22 PROCEDURE — 83880 ASSAY OF NATRIURETIC PEPTIDE: CPT

## 2019-04-22 PROCEDURE — 6360000002 HC RX W HCPCS: Performed by: STUDENT IN AN ORGANIZED HEALTH CARE EDUCATION/TRAINING PROGRAM

## 2019-04-22 PROCEDURE — 84300 ASSAY OF URINE SODIUM: CPT

## 2019-04-22 PROCEDURE — 2060000000 HC ICU INTERMEDIATE R&B

## 2019-04-22 PROCEDURE — 85378 FIBRIN DEGRADE SEMIQUANT: CPT

## 2019-04-22 PROCEDURE — 80051 ELECTROLYTE PANEL: CPT

## 2019-04-22 PROCEDURE — 6370000000 HC RX 637 (ALT 250 FOR IP): Performed by: STUDENT IN AN ORGANIZED HEALTH CARE EDUCATION/TRAINING PROGRAM

## 2019-04-22 PROCEDURE — 82565 ASSAY OF CREATININE: CPT

## 2019-04-22 PROCEDURE — 83036 HEMOGLOBIN GLYCOSYLATED A1C: CPT

## 2019-04-22 PROCEDURE — 94640 AIRWAY INHALATION TREATMENT: CPT

## 2019-04-22 PROCEDURE — C9113 INJ PANTOPRAZOLE SODIUM, VIA: HCPCS | Performed by: INTERNAL MEDICINE

## 2019-04-22 PROCEDURE — 96375 TX/PRO/DX INJ NEW DRUG ADDON: CPT

## 2019-04-22 PROCEDURE — 87205 SMEAR GRAM STAIN: CPT

## 2019-04-22 PROCEDURE — 6360000002 HC RX W HCPCS: Performed by: INTERNAL MEDICINE

## 2019-04-22 PROCEDURE — 99285 EMERGENCY DEPT VISIT HI MDM: CPT

## 2019-04-22 PROCEDURE — 82570 ASSAY OF URINE CREATININE: CPT

## 2019-04-22 PROCEDURE — 84520 ASSAY OF UREA NITROGEN: CPT

## 2019-04-22 RX ORDER — NICOTINE POLACRILEX 4 MG
15 LOZENGE BUCCAL PRN
Status: DISCONTINUED | OUTPATIENT
Start: 2019-04-22 | End: 2019-04-26 | Stop reason: HOSPADM

## 2019-04-22 RX ORDER — 0.9 % SODIUM CHLORIDE 0.9 %
1000 INTRAVENOUS SOLUTION INTRAVENOUS ONCE
Status: COMPLETED | OUTPATIENT
Start: 2019-04-22 | End: 2019-04-22

## 2019-04-22 RX ORDER — CALCIUM GLUCONATE 94 MG/ML
1 INJECTION, SOLUTION INTRAVENOUS ONCE
Status: DISCONTINUED | OUTPATIENT
Start: 2019-04-22 | End: 2019-04-22 | Stop reason: SDUPTHER

## 2019-04-22 RX ORDER — DEXTROSE MONOHYDRATE 25 G/50ML
25 INJECTION, SOLUTION INTRAVENOUS ONCE
Status: COMPLETED | OUTPATIENT
Start: 2019-04-22 | End: 2019-04-22

## 2019-04-22 RX ORDER — ASPIRIN 81 MG/1
324 TABLET, CHEWABLE ORAL ONCE
Status: DISCONTINUED | OUTPATIENT
Start: 2019-04-22 | End: 2019-04-26 | Stop reason: HOSPADM

## 2019-04-22 RX ORDER — ONDANSETRON 2 MG/ML
4 INJECTION INTRAMUSCULAR; INTRAVENOUS ONCE
Status: COMPLETED | OUTPATIENT
Start: 2019-04-22 | End: 2019-04-22

## 2019-04-22 RX ORDER — ACETAMINOPHEN 325 MG/1
650 TABLET ORAL EVERY 6 HOURS PRN
Status: DISCONTINUED | OUTPATIENT
Start: 2019-04-22 | End: 2019-04-26 | Stop reason: HOSPADM

## 2019-04-22 RX ORDER — DEXTROSE MONOHYDRATE 50 MG/ML
100 INJECTION, SOLUTION INTRAVENOUS PRN
Status: DISCONTINUED | OUTPATIENT
Start: 2019-04-22 | End: 2019-04-26 | Stop reason: HOSPADM

## 2019-04-22 RX ORDER — GABAPENTIN 100 MG/1
100 CAPSULE ORAL NIGHTLY
Status: DISCONTINUED | OUTPATIENT
Start: 2019-04-22 | End: 2019-04-26 | Stop reason: HOSPADM

## 2019-04-22 RX ORDER — HYDROCODONE BITARTRATE AND ACETAMINOPHEN 5; 325 MG/1; MG/1
1 TABLET ORAL EVERY 6 HOURS PRN
Status: DISCONTINUED | OUTPATIENT
Start: 2019-04-22 | End: 2019-04-23

## 2019-04-22 RX ORDER — PANTOPRAZOLE SODIUM 40 MG/10ML
40 INJECTION, POWDER, LYOPHILIZED, FOR SOLUTION INTRAVENOUS DAILY
Status: DISCONTINUED | OUTPATIENT
Start: 2019-04-22 | End: 2019-04-25

## 2019-04-22 RX ORDER — SODIUM CHLORIDE 9 MG/ML
INJECTION, SOLUTION INTRAVENOUS CONTINUOUS
Status: DISCONTINUED | OUTPATIENT
Start: 2019-04-22 | End: 2019-04-22 | Stop reason: ALTCHOICE

## 2019-04-22 RX ORDER — DEXTROSE MONOHYDRATE 25 G/50ML
12.5 INJECTION, SOLUTION INTRAVENOUS PRN
Status: DISCONTINUED | OUTPATIENT
Start: 2019-04-22 | End: 2019-04-26 | Stop reason: HOSPADM

## 2019-04-22 RX ORDER — MORPHINE SULFATE 2 MG/ML
2 INJECTION, SOLUTION INTRAMUSCULAR; INTRAVENOUS
Status: DISCONTINUED | OUTPATIENT
Start: 2019-04-22 | End: 2019-04-26 | Stop reason: HOSPADM

## 2019-04-22 RX ORDER — INSULIN GLARGINE 100 [IU]/ML
20 INJECTION, SOLUTION SUBCUTANEOUS NIGHTLY
Status: DISCONTINUED | OUTPATIENT
Start: 2019-04-22 | End: 2019-04-26 | Stop reason: HOSPADM

## 2019-04-22 RX ORDER — DULOXETIN HYDROCHLORIDE 30 MG/1
30 CAPSULE, DELAYED RELEASE ORAL DAILY
Status: DISCONTINUED | OUTPATIENT
Start: 2019-04-22 | End: 2019-04-23

## 2019-04-22 RX ORDER — SODIUM POLYSTYRENE SULFONATE 15 G/60ML
15 SUSPENSION ORAL; RECTAL ONCE
Status: COMPLETED | OUTPATIENT
Start: 2019-04-22 | End: 2019-04-22

## 2019-04-22 RX ORDER — ONDANSETRON 2 MG/ML
4 INJECTION INTRAMUSCULAR; INTRAVENOUS EVERY 6 HOURS PRN
Status: DISCONTINUED | OUTPATIENT
Start: 2019-04-22 | End: 2019-04-23

## 2019-04-22 RX ADMIN — SODIUM POLYSTYRENE SULFONATE 15 G: 15 SUSPENSION ORAL; RECTAL at 16:54

## 2019-04-22 RX ADMIN — SODIUM BICARBONATE: 84 INJECTION, SOLUTION INTRAVENOUS at 18:27

## 2019-04-22 RX ADMIN — ALBUTEROL SULFATE 10 MG: 2.5 SOLUTION RESPIRATORY (INHALATION) at 15:54

## 2019-04-22 RX ADMIN — DEXTROSE MONOHYDRATE 25 G: 25 INJECTION, SOLUTION INTRAVENOUS at 16:17

## 2019-04-22 RX ADMIN — ONDANSETRON 4 MG: 2 INJECTION INTRAMUSCULAR; INTRAVENOUS at 14:31

## 2019-04-22 RX ADMIN — PANTOPRAZOLE SODIUM 40 MG: 40 INJECTION, POWDER, FOR SOLUTION INTRAVENOUS at 22:08

## 2019-04-22 RX ADMIN — INSULIN GLARGINE 20 UNITS: 100 INJECTION, SOLUTION SUBCUTANEOUS at 22:07

## 2019-04-22 RX ADMIN — SODIUM BICARBONATE 50 MEQ: 84 INJECTION, SOLUTION INTRAVENOUS at 16:17

## 2019-04-22 RX ADMIN — CALCIUM GLUCONATE 1 G: 98 INJECTION, SOLUTION INTRAVENOUS at 16:07

## 2019-04-22 RX ADMIN — INSULIN LISPRO 2 UNITS: 100 INJECTION, SOLUTION INTRAVENOUS; SUBCUTANEOUS at 22:07

## 2019-04-22 RX ADMIN — MORPHINE SULFATE 2 MG: 2 INJECTION, SOLUTION INTRAMUSCULAR; INTRAVENOUS at 22:08

## 2019-04-22 RX ADMIN — SODIUM CHLORIDE 1000 ML: 9 INJECTION, SOLUTION INTRAVENOUS at 17:27

## 2019-04-22 RX ADMIN — METOPROLOL TARTRATE 25 MG: 25 TABLET ORAL at 22:10

## 2019-04-22 RX ADMIN — SODIUM CHLORIDE 1000 ML: 9 INJECTION, SOLUTION INTRAVENOUS at 16:17

## 2019-04-22 RX ADMIN — GABAPENTIN 100 MG: 100 CAPSULE ORAL at 22:11

## 2019-04-22 RX ADMIN — INSULIN HUMAN 10 UNITS: 100 INJECTION, SOLUTION PARENTERAL at 16:17

## 2019-04-22 RX ADMIN — HYDROCODONE BITARTRATE AND ACETAMINOPHEN 1 TABLET: 5; 325 TABLET ORAL at 19:52

## 2019-04-22 ASSESSMENT — PAIN DESCRIPTION - DESCRIPTORS
DESCRIPTORS: ACHING
DESCRIPTORS: ACHING;CONSTANT;SHARP

## 2019-04-22 ASSESSMENT — PAIN SCALES - GENERAL
PAINLEVEL_OUTOF10: 9
PAINLEVEL_OUTOF10: 9
PAINLEVEL_OUTOF10: 8
PAINLEVEL_OUTOF10: 7
PAINLEVEL_OUTOF10: 3

## 2019-04-22 ASSESSMENT — PAIN - FUNCTIONAL ASSESSMENT: PAIN_FUNCTIONAL_ASSESSMENT: ACTIVITIES ARE NOT PREVENTED

## 2019-04-22 ASSESSMENT — PAIN DESCRIPTION - LOCATION
LOCATION: ABDOMEN
LOCATION: BACK

## 2019-04-22 ASSESSMENT — PAIN DESCRIPTION - ORIENTATION: ORIENTATION: RIGHT;LEFT

## 2019-04-22 ASSESSMENT — PAIN DESCRIPTION - PROGRESSION: CLINICAL_PROGRESSION: NOT CHANGED

## 2019-04-22 ASSESSMENT — PAIN DESCRIPTION - PAIN TYPE: TYPE: CHRONIC PAIN

## 2019-04-22 ASSESSMENT — PAIN DESCRIPTION - ONSET: ONSET: ON-GOING

## 2019-04-22 ASSESSMENT — PAIN DESCRIPTION - FREQUENCY: FREQUENCY: CONTINUOUS

## 2019-04-22 NOTE — ED NOTES
Bed: H1  Expected date:   Expected time:   Means of arrival:   Comments:     Nicolle Dunlap RN  04/22/19 2687

## 2019-04-22 NOTE — ED PROVIDER NOTES
The patient is a 82-year-old female that presents the emergency department to be evaluated for altered mental status. When she woke this morning she was found to be much more confused than her baseline mental status. She was also noted to be hypoxic. They placed her on nasal cannula oxygen at that time, but her hypoxia did not resolve. She was also noted to have bilateral cyanosis of her hands. EMS personnel were subsequently called for transport to the emergency department. The patient has a past medical history of chronic low back pain for which she has been with a limited mobility, which is the reason she resides in a nursing home. She also past medical history of hypertension, diabetes mellitus, COPD, and congestive heart failure. EMS personnel note that they had a difficult time obtaining a pulse ox while she was in route due to poor waveform. No interventions were performed while in route. Here in the emergency department, she cannot specify which symptoms, if any, she is experiencing at this time. No further history is available at this time. The history is provided by the patient, the nursing home and the EMS personnel. Review of Systems   Unable to perform ROS: Mental status change       Physical Exam   Constitutional:   Alert and oriented ×3, however, slow responses. Pleasantly confused. No acute distress. HENT:   Head: Normocephalic and atraumatic. Mouth/Throat: No oropharyngeal exudate. Cardiovascular: Normal rate, regular rhythm, normal heart sounds and intact distal pulses. No murmur heard. Pulmonary/Chest: Effort normal and breath sounds normal. No stridor. No respiratory distress. She has no wheezes. She has no rales. Abdominal: Soft. She exhibits no distension and no mass. There is no tenderness. There is no rebound and no guarding. Musculoskeletal:   No peripheral edema, erythema, or temperature discrepancy when comparing bilateral lower extremities.  Normal distal pulses in bilateral lower extremities. Cyanosis involving the fingers of bilateral upper extremities. However, normal radial pulses in bilateral upper extremities. Neurological: She is alert. She has normal strength. She is disoriented. She displays no tremor. No sensory deficit. Coordination normal.   Skin: Skin is warm and dry. Nursing note and vitals reviewed. Procedures    MDM    ED Course as of Apr 22 1554   Mon Apr 22, 2019   1453   ATTENDING PROVIDER ATTESTATION:     I have personally performed and/or participated in the history, exam, medical decision making, and procedures and agree with all pertinent clinical information unless otherwise noted. I have also reviewed and agree with the past medical, family and social history unless otherwise noted. I have discussed this patient in detail with the resident and provided the instruction and education regarding the evidence-based evaluation and treatment of [unfilled]  History: patient presents with ams. She is complaining of chronic back pain. NH reports that she is in NH due to decreased mobility from back pain. They state the confusion is new. My findings: Ally Gallego is a 64 y.o. female whom is in no distress. Physical exam reveals PERRL, EOMI, heart RRR, lungs CTA, abdomen is soft and nontender. She is awake but slow with speech and confused. She is able to move extremities b/l. My plan: Symptomatic and supportive care. CT head, further treatment and evaluation. Electronically signed by Geoffrey Moseley DO on 4/22/19 at 2:53 PM          [JS]      ED Course User Index  [JS] Geoffrey Moseley DO       EKG Interpretation    Interpreted by emergency department physician    Clinical Impression: Normal sinus rhythm. No ST segment elevations or depressions.  When compared to EKG from February 3, 2019, T waves are peaked in the anterolateral leads as well as in the inferior leads when compared to EKG from that breast, Generalized headaches, HIGH CHOLESTEROL, Hypertension, Irritable bowel, Kidney disease, Lumbago, Lumbar radiculopathy Bilateral, Migraines, Muscle weakness, Poor motor control of trunk, Postlaminectomy syndrome, lumbar region, S/P Fusion [facility] Gene [facility]), Sinus congestion, Sleep apnea, and Type 2 diabetes mellitus [facility] Past Surgical History:  has a past surgical history that includes back surgery; Gallbladder surgery; Tubal ligation; Breast cyst excision; Appendectomy; Cholecystectomy; Spine surgery; lumbar fusion (2001); and Finger surgery (03/2015). Social History:  reports that she has been smoking cigarettes. She has a 8.25 pack-year smoking history. She has quit using smokeless tobacco. She reports that she does not drink alcohol or use drugs. Family History: family history includes Breast Cancer in her sister; Diabetes in her brother; Heart Surgery in her mother. The patients home medications have been reviewed.     Allergies: Niacin and related and Penicillins    -------------------------------------------------- RESULTS -------------------------------------------------    Lab  Results for orders placed or performed during the hospital encounter of 04/22/19   Respiratory Panel, Film Array   Result Value Ref Range    Film Array Adenovirus       Result: Not Detected  *  *  Normal Range: Not Detected      Film Array Coronavirus HKU1       Result: Not Detected  *  *  Normal Range: Not Detected      Film Array Conoravirus NL63       Result: Not Detected  *  *  Normal Range: Not Detected      Film Array Coronavirus 229E       Result: Not Detected  *  *  Normal Range: Not Detected      Film Array Coronavirus OC43       Result: Not Detected  *  *  Normal Range: Not Detected      Film Array Influenza A Virus       Result: Not Detected  *  *  Normal Range: Not Detected      Film Array Influenza A Virus H1       Result: Not Detected  *  *  Normal Range: Not Lymphocytes # 1.28 (L) 1.50 - 4.00 E9/L    Monocytes # 0.30 0.10 - 0.95 E9/L    Eosinophils # 0.12 0.05 - 0.50 E9/L    Basophils # 0.06 0.00 - 0.20 A6/G   Basic metabolic panel   Result Value Ref Range    Sodium 129 (L) 132 - 146 mmol/L    Potassium 7.3 (HH) 3.5 - 5.0 mmol/L    Chloride 87 (L) 98 - 107 mmol/L    CO2 11 (L) 22 - 29 mmol/L    Anion Gap 31 (H) 7 - 16 mmol/L    Glucose 130 (H) 74 - 99 mg/dL     (H) 6 - 20 mg/dL    CREATININE 11.9 (HH) 0.5 - 1.0 mg/dL    GFR Non-African American 3 >=60 mL/min/1.73    GFR African American 4     Calcium 9.2 8.6 - 10.2 mg/dL   Troponin   Result Value Ref Range    Troponin 0.05 (H) 0.00 - 0.03 ng/mL   Brain Natriuretic Peptide   Result Value Ref Range    Pro-BNP 8,756 (H) 0 - 125 pg/mL   Blood Gas, Arterial   Result Value Ref Range    Date Analyzed [id number]     Time Analyzed 1535     Source: Blood Arterial     pH, Blood Gas 7.139 (LL) 7.350 - 7.450    PCO2 28.7 (L) 35.0 - 45.0 mmHg    PO2 84.8 60.0 - 100.0 mmHg    HCO3 9.5 (L) 22.0 - 26.0 mmol/L    B.E. -18.1 (L) -3.0 - 3.0 mmol/L    O2 Sat 93.8 92.0 - 98.5 %    O2Hb 93.3 (L) 94.0 - 97.0 %    COHb 0.5 0.0 - 1.5 %    MetHb 0.0 0.0 - 1.5 %    O2 Content 16.0 mL/dL    HHb 6.2 (H) 0.0 - 5.0 %    tHb (est) 12.1 11.5 - 16.5 g/dL    Mode RA     Comment with readback     Date Of Collection      Time Collected      Pt Temp 37.0 C     ID [id number]     Lab [id number]     Critical(s) Notified Called to [facility]    Urinalysis with Microscopic   Result Value Ref Range    Color, UA Yellow Straw/Yellow    Clarity, UA SLCLOUDY Clear    Glucose, Ur Negative Negative mg/dL    Bilirubin Urine Negative Negative    Ketones, Urine Negative Negative mg/dL    Specific Gravity, UA 1.020 1.005 - 1.030    Blood, Urine TRACE (A) Negative    pH, UA 5.5 5.0 - 9.0    Protein, UA Negative Negative mg/dL    Urobilinogen, Urine 0.2 <2.0 E.U./dL    Nitrite, Urine Negative Negative    Leukocyte Esterase, Urine Negative Negative    WBC, UA 0-1 0 - 5 /HPF RBC, UA 2-5 0 - 2 /HPF    Epi Cells MODERATE /HPF    Bacteria, UA RARE (A) /HPF   Urine Drug Screen   Result Value Ref Range    Amphetamine Screen, Urine NOT DETECTED Negative <1000 ng/mL    Barbiturate Screen, Ur NOT DETECTED Negative < 200 ng/mL    Benzodiazepine Screen, Urine NOT DETECTED Negative < 200 ng/mL    Cannabinoid Scrn, Ur NOT DETECTED Negative < 50ng/mL    Cocaine Metabolite Screen, Urine NOT DETECTED Negative < 300 ng/mL    Opiate Scrn, Ur POSITIVE (A) Negative < 300ng/mL    PCP Screen, Urine NOT DETECTED Negative < 25 ng/mL    Methadone Screen, Urine NOT DETECTED Negative <300 ng/mL    Propoxyphene Scrn, Ur NOT DETECTED Negative <300 ng/mL   Serum Drug Screen   Result Value Ref Range    Ethanol Lvl <10 mg/dL    Acetaminophen Level <5.0 (L) 10.0 - 44.6 mcg/mL    Salicylate, Serum <1.0 0.0 - 30.0 mg/dL    TCA Scrn NEGATIVE Cutoff:300 ng/mL   Lipase   Result Value Ref Range    Lipase 24 13 - 60 U/L   Hepatic function panel   Result Value Ref Range    Total Protein 7.0 6.4 - 8.3 g/dL    Alb 3.8 3.5 - 5.2 g/dL    Alkaline Phosphatase 98 35 - 104 U/L    ALT 13 0 - 32 U/L    AST 12 0 - 31 U/L    Total Bilirubin 0.2 0.0 - 1.2 mg/dL    Bilirubin, Direct <0.2 0.0 - 0.3 mg/dL    Bilirubin, Indirect see below 0.0 - 1.0 mg/dL   SPECIMEN REJECTION   Result Value Ref Range    Rejected Test DIMER     Reason for Rejection see below    D-dimer, quantitative   Result Value Ref Range    D-Dimer, Quant 555 ng/mL DDU   CK   Result Value Ref Range    Total  20 - 180 U/L   Sodium, urine, random   Result Value Ref Range    Sodium, Ur 54 Not Established mmol/L   UREA NITROGEN, URINE   Result Value Ref Range    Urea Nitrogen, Ur 285 (L) 800 - 1666 mg/dL   Osmolality, Urine   Result Value Ref Range    Osmolality, Ur 344 300 - 900 mOsm/kg   Hemoglobin A1c   Result Value Ref Range    Hemoglobin A1C 6.9 (H) 4.0 - 5.6 %   Sodium, urine, random   Result Value Ref Range    Sodium, Ur 67 Not Established mmol/L   Creatinine, Range    Folate 3.8 (L) 4.8 - 24.2 ng/mL   Blood Gas, Arterial   Result Value Ref Range    Date Analyzed [id number]     Time Analyzed 0576     Source: Blood Arterial     pH, Blood Gas 7.278 (L) 7.350 - 7.450    PCO2 29.9 (L) 35.0 - 45.0 mmHg    PO2 72.6 60.0 - 100.0 mmHg    HCO3 13.7 (L) 22.0 - 26.0 mmol/L    B.E. -11.8 (L) -3.0 - 3.0 mmol/L    O2 Sat 93.1 92.0 - 98.5 %    O2Hb 92.5 (L) 94.0 - 97.0 %    COHb 0.3 0.0 - 1.5 %    MetHb 0.3 0.0 - 1.5 %    O2 Content 14.2 mL/dL    HHb 6.9 (H) 0.0 - 5.0 %    tHb (est) 10.9 (L) 11.5 - 16.5 g/dL    Mode RA     Date Of Collection      Time Collected      Pt Temp 37.0 C          Lab [id number]     Critical(s) Notified .  No Critical Values    Lactic acid, plasma   Result Value Ref Range    Lactic Acid 2.8 (H) 0.5 - 2.2 mmol/L   Troponin   Result Value Ref Range    Troponin 0.05 (H) 0.00 - 0.03 ng/mL   Basic metabolic panel   Result Value Ref Range    Sodium 134 132 - 146 mmol/L    Potassium 6.1 (H) 3.5 - 5.0 mmol/L    Chloride 91 (L) 98 - 107 mmol/L    CO2 16 (L) 22 - 29 mmol/L    Anion Gap 27 (H) 7 - 16 mmol/L    Glucose 152 (H) 74 - 99 mg/dL     (H) 6 - 20 mg/dL    CREATININE 11.4 (HH) 0.5 - 1.0 mg/dL    GFR Non-African American 3 >=60 mL/min/1.73    GFR African American 4     Calcium 8.3 (L) 8.6 - 10.2 mg/dL   Basic metabolic panel   Result Value Ref Range    Sodium 137 132 - 146 mmol/L    Potassium 5.8 (H) 3.5 - 5.0 mmol/L    Chloride 92 (L) 98 - 107 mmol/L    CO2 18 (L) 22 - 29 mmol/L    Anion Gap 27 (H) 7 - 16 mmol/L    Glucose 135 (H) 74 - 99 mg/dL     (H) 6 - 20 mg/dL    CREATININE 11.7 (HH) 0.5 - 1.0 mg/dL    GFR Non-African American 3 >=60 mL/min/1.73    GFR African American 4     Calcium 8.5 (L) 8.6 - 10.2 mg/dL   Protime-INR   Result Value Ref Range    Protime 10.4 9.3 - 12.4 sec    INR 0.9    Osmolality, Serum   Result Value Ref Range    Osmolality 335 (H) 285 - 310 mOsm/Kg   CBC   Result Value Ref Range    WBC 8.5 4.5 - 11.5 E9/L    RBC 2.88 CO2 12 (L) 22 - 29 mmol/L    POC Anion Gap 21 (H) 7 - 16 mmol/L    POC Glucose 173 (H) 74 - 99 mg/dl    POC  (H) 8 - 23 mg/dL    POC Creatinine 12.0 (HH) 0.5 - 1.0 mg/dL    GFR Non-African American 3 >=60 mL/min/1.73    GFR African American 4    POCT Glucose   Result Value Ref Range    Meter Glucose 150 (H) 74 - 99 mg/dL   POCT Glucose   Result Value Ref Range    Meter Glucose 132 (H) 74 - 99 mg/dL   POCT Glucose   Result Value Ref Range    Meter Glucose 139 (H) 74 - 99 mg/dL   POCT Glucose   Result Value Ref Range    Meter Glucose 106 (H) 74 - 99 mg/dL   POCT Glucose   Result Value Ref Range    Meter Glucose 190 (H) 74 - 99 mg/dL   EKG 12 Lead   Result Value Ref Range    Ventricular Rate 95 BPM    Atrial Rate 83 BPM    P-R Interval 208 ms    QRS Duration 76 ms    Q-T Interval 386 ms    QTc Calculation (Bazett) 485 ms    P Axis 59 degrees    R Axis 60 degrees    T Axis 63 degrees       Radiology  US DUP LOWER EXTREMITIES BILATERAL VENOUS   Final Result   No evidence of bilateral lower extremity deep venous   thrombus from common femoral vein to proximal calf. IR FLUORO GUIDED CVA DEVICE PLMT/REPLACE/REMOVAL   Final Result   1. Successful placement of a temporary dialysis catheter via the right   internal jugular vein. IR ULTRASOUND GUIDANCE VASCULAR ACCESS   Final Result   Ultrasound guidance was provided during placement of a   temporary dialysis catheter via the right internal jugular vein. IR FLUORO GUIDED CVA DEVICE PLMT/REPLACE/REMOVAL   Final Result   Successful placement of a 5 [place] double-lumen Power   PICC line using ultrasound guidance via the basilic vein. IR ULTRASOUND GUIDANCE VASCULAR ACCESS   Final Result   Ultrasound guidance was provided during placement of a   PICC line. XR ABDOMEN (KUB) (SINGLE AP VIEW)   Final Result   1. No acute abdominal abnormality. XR CHEST STANDARD (2 VW)   Final Result   No acute cardiopulmonary disease. CT Head WO Contrast   Final Result   1. No acute intracranial hemorrhage or mass effect. 2. Small area of encephalomalacia within the high right parietal lobe. XR CHEST PORTABLE    (Results Pending)       ------------------------- NURSING NOTES AND VITALS REVIEWED ---------------------------  Date / Time Roomed:  4/22/2019 12:48 PM  ED Bed Assignment:  [id number]    The nursing notes within the ED encounter and vital signs as below have been reviewed.    Patient Vitals for the past 24 hrs:   BP Temp Temp src Pulse Resp SpO2 Weight   04/24/19 0500 (!) 112/56 -- -- 70 16 97 % --   04/24/19 0400 (!) 91/55 97.3 °F (36.3 °C) Axillary 68 16 98 % --   04/24/19 0300 (!) 119/59 -- -- 74 19 98 % --   04/24/19 0200 (!) 116/58 -- -- 70 15 98 % --   04/24/19 0100 (!) 88/44 -- -- 73 13 100 % --   04/24/19 0000 (!) 121/54 97.9 °F (36.6 °C) Oral 78 15 99 % --   04/23/19 2300 (!) 111/41 -- -- 78 26 98 % --   04/23/19 2200 108/67 -- -- 76 15 94 % --   04/23/19 2100 (!) 130/55 -- -- 70 25 97 % --   04/23/19 2000 (!) 140/68 97.9 °F (36.6 °C) Oral 78 15 97 % --   04/23/19 1905 98/65 -- -- 86 18 98 % --   04/23/19 1805 119/72 97.7 °F (36.5 °C) -- 94 21 94 % 246 lb 14.6 oz (112 kg)   04/23/19 1755 (!) 109/57 (!) 32 °F (0 °C) -- 58 -- -- --   04/23/19 1745 (!) 123/35 -- -- 63 -- -- --   04/23/19 1730 139/74 -- -- 68 -- -- --   04/23/19 1700 116/79 -- -- 67 20 95 % --   04/23/19 1645 (!) 115/91 -- -- 76 -- -- --   04/23/19 1630 92/70 -- -- 72 -- -- --   04/23/19 1615 122/67 -- -- 73 -- -- --   04/23/19 1605 110/65 98 °F (36.7 °C) Oral 74 19 100 % --   04/23/19 1600 110/65 -- -- 74 -- -- --   04/23/19 1545 (!) 96/57 -- -- 79 -- -- --   04/23/19 1530 96/87 -- -- 93 -- -- --   04/23/19 1515 131/78 -- -- 72 -- -- --   04/23/19 1500 94/83 -- -- 85 14 93 % --   04/23/19 1455 (!) 139/55 -- -- 70 -- -- --   04/23/19 1430 122/63 98.6 °F (37 °C) -- 69 -- -- 246 lb 14.6 oz (112 kg)   04/23/19 1400 122/63 -- -- 77 19 92 % --   04/23/19 1300 138/63 -- -- 76 15 98 % --   04/23/19 1200 122/63 98.5 °F (36.9 °C) Oral 77 21 98 % --   04/23/19 1100 -- -- -- 81 25 95 % --   04/23/19 1000 -- -- -- 79 19 90 % --   04/23/19 0900 98/62 -- -- 78 17 93 % --   04/23/19 0845 -- -- -- -- -- -- 241 lb 6.4 oz (109.5 kg)   04/23/19 0800 (!) 140/59 98 °F (36.7 °C) Oral 79 22 91 % --   04/23/19 0706 -- -- -- 79 16 92 % --   04/23/19 0700 (!) 123/55 -- -- 77 15 95 % --       Oxygen Saturation Interpretation: Normal      ------------------------------------------ PROGRESS NOTES ------------------------------------------  Re-evaluation(s):    3:57 PM vital signs remain stable. Patient resting in bed comfortably without any O2 applied. 96% SPO2. Patient remains pleasantly confused, but otherwise no examination changes. 4:46 PM hyperkalemia protocol has been provided. I spoke with Dr. Amina Ng on call, he will except the patient to the intensive care unit if patient requires emergent dialysis. I also spoke with Dr. Slick Osman, Nephrology, and discussed the case. He wishes to see the repeat potassium level before deciding on necessity for emergency dialysis. Repeat potassium with be drawn at 5pm. Approximately 30 minutes after completion of hyperkalemia protocol. At this time, the case has been signed out to ED Attending Dr. Angela Colin, who will continue to follow the case. We have discussed the case in detail. I have spoken with the patient and discussed todays results, in addition to providing specific details for the plan of care and counseling regarding the diagnosis and prognosis. Their questions are answered at this time and they are agreeable with the plan.      --------------------------------- ADDITIONAL PROVIDER NOTES ---------------------------------  Consultations:  Spoke with Dr. Joel Villa, he will admit this patient. Spoke with Dr. Soledad Banerjee, he will provide consultation.      This patient's ED course included: a personal history and physicial

## 2019-04-22 NOTE — PROGRESS NOTES
Renal consult dictated    Hyperkalemia secondary to DOLORES + lisinopril  DOLORES volume contraction in setting of ACE  Hyponatremia related to metolazone and HzTZ  Metabolic Acidosis increased Anion gap,  check lactic acid was on metformin  DM type 2 with neuropathy  HTN  Hyperlipidemia  Hx of spinal stenosis s/p back surgery 2000 with chronic back pain requiring nursing home placement  Cholecystectomy  Appendectomy  COPD  Hx of DVT  Hx of CHF    Plan   Begin bicarbonate drip  Spot urine sodium, creatine urea  Urine and serum osmolality  [place] has been inserted no significant pvr recorded  Do not restart lasix HCTZ, metolazone, lisinopril, neurontin, metformin at this time   Repeat ABG after liter of fluid infused

## 2019-04-22 NOTE — H&P
Department of Internal Medicine        CHIEF COMPLAINT:  Altered mental status    Reason for Admission:  Acute renal failure, hyperkalemia    HISTORY OF PRESENT ILLNESS:      The patient is a 64 y.o. female who presents with increased weakness, confusion with right upper quadrant abdominal pain and nausea vomiting. The patient resides at [facility] care [facility] but no history of chronic kidney disease. Patient states she got sick yesterday with an episode of nausea vomiting last night she had some diarrhea yesterday and increasing diarrhea today. She woke up confused. Nursing personnel noticed the patient was hypoxic in the nursing home. The patient denies any significant diarrhea or nausea vomiting prior to yesterday. Patient's had decrease appetite for the past few days. In the ER the patient was noted to be into acute renal failure with hyperkalemia. Patient has a long-standing history of diabetes, hypertension with diabetic neuropathy. The potassium had improved and was going to be sent to the ICU but the intensivist at that time because of the improved potassium refused admission per ER doctor. The patient at this time bowel signs are stable the patient is more alert and was transferred to [address] [place] [address] and consult with nephrology.     Past Medical History:    Past Medical History:   Diagnosis Date    Arthritis     Back pain     Bladder infection     Blood transfusion 2001    Cervical facet syndrome 9/1/2012    Chest pain     CHF (congestive heart failure), NYHA class I, acute on chronic, combined ([address] 6/25/2018    COPD (chronic obstructive pulmonary disease) (HCC)     Degenerative disc disease, cervical     Degenerative Osteoarthritis of cervical spine 9/1/2012    Degenerative Osteoarthritis of lumbar spine 9/1/2012    Diabetes mellitus, insulin dependent (IDDM), uncontrolled ([address]     Diabetic Peripheral neuropathy 9/1/2012    Facet syndrome, lumbar 9/1/2012    FHx: migraine headaches     Fibrocystic breast     Generalized headaches     HIGH CHOLESTEROL     Hypertension     Irritable bowel     Kidney disease     Lumbago 6/13/2012    Lumbar radiculopathy Bilateral 9/1/2012    Migraines     Muscle weakness     Poor motor control of trunk     Postlaminectomy syndrome, lumbar region, S/P Fusion (Dr. Delon Lira, [place] 2001) 6/13/2012    Sinus congestion     Sleep apnea     Type 2 diabetes mellitus without complication ([address]      Past Surgical History:    Past Surgical History:   Procedure Laterality Date    APPENDECTOMY      BACK SURGERY      spinal surgery    BREAST CYST EXCISION      CHOLECYSTECTOMY      FINGER SURGERY  03/2015    from a cat bite from March 12 2015   [address] LUMBAR FUSION  2001    L5-S1 posterior lumbar interbody fusion    SPINE SURGERY      TUBAL LIGATION         Medications Prior to Admission:    @  Prior to Admission medications    Medication Sig Start Date End Date Taking? Authorizing Provider   gabapentin (NEURONTIN) 400 MG capsule Take 800 mg by mouth 4 times daily. Gia Quintero     Historical Provider, MD   insulin glargine (LANTUS) 100 UNIT/ML injection vial Inject 78 Units into the skin nightly    Historical Provider, MD   insulin aspart (NOVOLOG FLEXPEN) 100 UNIT/ML injection pen Inject 0-30 Units into the skin 3 times daily (before meals)    Historical Provider, MD   acetaminophen (TYLENOL) 325 MG tablet Take 2 tablets by mouth every 4 hours as needed for Pain 9/1/18   Analisa Hahn DO   furosemide (LASIX) 40 MG tablet Take 40 mg by mouth daily    Historical Provider, MD   ibuprofen (ADVIL;MOTRIN) 200 MG tablet Take 1,400 mg by mouth See Admin Instructions Patient takes 7 x 200 mg tablets EVERY FIVE HOURS    Historical Provider, MD   lisinopril (PRINIVIL;ZESTRIL) 20 MG tablet Take 20 mg by mouth daily    Historical Provider, MD   metoprolol-hydrochlorothiazide (LOPRESSOR HCT) 50-25 MG per tablet Take 1 tablet by mouth daily    Historical Provider, MD sitaGLIPtan-metformin (JANUMET)  MG per tablet Take 1 tablet by mouth 2 times daily (with meals)    Historical Provider, MD   DULoxetine (CYMBALTA) 60 MG capsule take 1 capsule by mouth twice a day 9/2/14   Rashida Hampton MD       Allergies:  Niacin and related and Penicillins    Social History:   Social History     Socioeconomic History    Marital status: Legally      Spouse name: Not on file    Number of children: 2    Years of education: 12+    Highest education level: Not on file   Occupational History    Not on file   Social Needs    Financial resource strain: Not on file    Food insecurity:     Worry: Not on file     Inability: Not on file    Transportation needs:     Medical: Not on file     Non-medical: Not on file   Tobacco Use    Smoking status: Current Every Day Smoker     Packs/day: 0.25     Years: 33.00     Pack years: 8.25     Types: Cigarettes    Smokeless tobacco: Former User   Substance and Sexual Activity    Alcohol use: No     Comment: social    Drug use: No    Sexual activity: Not Currently   Lifestyle    Physical activity:     Days per week: Not on file     Minutes per session: Not on file    Stress: Not on file   Relationships    Social connections:     Talks on phone: Not on file     Gets together: Not on file     Attends Faith service: Not on file     Active member of club or organization: Not on file     Attends meetings of clubs or organizations: Not on file     Relationship status: Not on file    Intimate partner violence:     Fear of current or ex partner: Not on file     Emotionally abused: Not on file     Physically abused: Not on file     Forced sexual activity: Not on file   Other Topics Concern    Not on file   Social History Narrative    Patient lives at home alone. She reports use of a wheelchair.         Family History:   Family History   Problem Relation Age of Onset    Heart Surgery Mother     Breast Cancer Sister     Diabetes Brother 0.12 04/22/2019    BASOSABS 0.06 04/22/2019     CMP:    Lab Results   Component Value Date     04/22/2019    K 7.3 04/22/2019    K 3.3 09/01/2018    CL 87 04/22/2019    CO2 12 04/22/2019     04/22/2019    CREATININE 12.0 04/22/2019    CREATININE 11.9 04/22/2019    GFRAA 4 04/22/2019    LABGLOM 3 04/22/2019    GLUCOSE 130 04/22/2019    GLUCOSE 85 09/22/2011    PROT 7.0 04/22/2019    LABALBU 3.8 04/22/2019    CALCIUM 9.2 04/22/2019    BILITOT 0.2 04/22/2019    ALKPHOS 98 04/22/2019    AST 12 04/22/2019    ALT 13 04/22/2019     Magnesium:    Lab Results   Component Value Date    MG 1.9 09/01/2018     Phosphorus:  No results found for: PHOS  PT/INR:    Lab Results   Component Value Date    PROTIME 12.1 05/29/2015    PROTIME 11.2 08/16/2011    INR 1.1 05/29/2015     Troponin:    Lab Results   Component Value Date    TROPONINI 0.05 04/22/2019     U/A:    Lab Results   Component Value Date    COLORU Yellow 04/22/2019    PROTEINU Negative 04/22/2019    PHUR 5.5 04/22/2019    WBCUA 0-1 04/22/2019    WBCUA 0-1 01/17/2011    RBCUA 2-5 04/22/2019    RBCUA NONE 05/19/2013    BACTERIA RARE 04/22/2019    CLARITYU SLCLOUDY 04/22/2019    SPECGRAV 1.020 04/22/2019    LEUKOCYTESUR Negative 04/22/2019    UROBILINOGEN 0.2 04/22/2019    BILIRUBINUR Negative 04/22/2019    BILIRUBINUR NEGATIVE 01/17/2011    BLOODU TRACE 04/22/2019    GLUCOSEU Negative 04/22/2019    GLUCOSEU >=1000 01/17/2011     ABG:    Lab Results   Component Value Date    PH 7.139 04/22/2019    PCO2 28.7 04/22/2019    PO2 84.8 04/22/2019    HCO3 9.5 04/22/2019    BE -18.1 04/22/2019    O2SAT 93.8 04/22/2019     HgBA1c:    Lab Results   Component Value Date    LABA1C 7.7 08/30/2018     FLP:    Lab Results   Component Value Date    TRIG 165 09/26/2013    HDL 39.0 09/26/2013    LDLCALC 79 09/26/2013     TSH:    Lab Results   Component Value Date    TSH 1.890 08/30/2018     IRON:  No results found for: IRON  LIPASE:    Lab Results   Component Value Date LIPASE 24 04/22/2019       ASSESSMENT AND PLAN:    1. Acute renal failure  2. Hyperkalemia-improved with treatment in the ED  3. Insulin-dependent diabetes mellitus type II  4. Hypertension  5. Right upper quadrant abdominal pain associated with some nausea vomiting  6. Metabolic acidosis  7. Sleep apnea  8.  COPD      Patient Active Problem List    Diagnosis Date Noted    Leg swelling 08/30/2018    CHF (congestive heart failure), NYHA class I, acute on chronic, combined ([facility] 08/13/2018    Morbid obesity with BMI of 40.0-44.9, adult ([facility] 06/26/2018    Diabetes mellitus type 2, uncontrolled [facility] 06/26/2018    History of DVT (deep vein thrombosis) 06/26/2018    Essential hypertension 06/26/2018    FÁTIMA (obstructive sleep apnea) 06/26/2018    Chronic back pain 06/26/2018    COPD (chronic obstructive pulmonary disease) ([facility]      -IV fluids  -Consult nephrology  -Transfer to Admittedly  -Home meds reviewed-hold lisinopril, diuretics and stop ibuprofen  -Labs in a.m.  -Glucoscans 4 times a day with Humalog coverage  -CT the abdomen with oral contrast only when patient is more stabilized  -Adjust pain medication  -Zofran 4 mg IV push every 6 when necessary  -Protonix 40 mg IV push daily  -Metoprolol 25 twice a day    Priyank Gordillo D.O.  4/22/2019  5:32 PM

## 2019-04-23 ENCOUNTER — APPOINTMENT (OUTPATIENT)
Dept: INTERVENTIONAL RADIOLOGY/VASCULAR | Age: 59
DRG: 682 | End: 2019-04-23
Payer: COMMERCIAL

## 2019-04-23 ENCOUNTER — APPOINTMENT (OUTPATIENT)
Dept: ULTRASOUND IMAGING | Age: 59
DRG: 682 | End: 2019-04-23
Payer: COMMERCIAL

## 2019-04-23 LAB
ANION GAP SERPL CALCULATED.3IONS-SCNC: 27 MMOL/L (ref 7–16)
ANION GAP SERPL CALCULATED.3IONS-SCNC: 27 MMOL/L (ref 7–16)
ANION GAP SERPL CALCULATED.3IONS-SCNC: 28 MMOL/L (ref 7–16)
B.E.: -11.8 MMOL/L (ref -3–3)
BACTERIA: ABNORMAL /HPF
BILIRUBIN URINE: NEGATIVE
BLOOD, URINE: ABNORMAL
BUN BLDV-MCNC: 111 MG/DL (ref 6–20)
CALCIUM SERPL-MCNC: 8.3 MG/DL (ref 8.6–10.2)
CALCIUM SERPL-MCNC: 8.5 MG/DL (ref 8.6–10.2)
CALCIUM SERPL-MCNC: 8.6 MG/DL (ref 8.6–10.2)
CASTS: ABNORMAL /LPF
CHLORIDE BLD-SCNC: 91 MMOL/L (ref 98–107)
CHLORIDE BLD-SCNC: 92 MMOL/L (ref 98–107)
CHLORIDE BLD-SCNC: 92 MMOL/L (ref 98–107)
CLARITY: ABNORMAL
CO2: 12 MMOL/L (ref 22–29)
CO2: 16 MMOL/L (ref 22–29)
CO2: 18 MMOL/L (ref 22–29)
COHB: 0.3 % (ref 0–1.5)
COLOR: YELLOW
CREAT SERPL-MCNC: 11.4 MG/DL (ref 0.5–1)
CREAT SERPL-MCNC: 11.5 MG/DL (ref 0.5–1)
CREAT SERPL-MCNC: 11.7 MG/DL (ref 0.5–1)
CRITICAL: ABNORMAL
DATE ANALYZED: ABNORMAL
DATE OF COLLECTION: ABNORMAL
EKG ATRIAL RATE: 83 BPM
EKG P AXIS: 59 DEGREES
EKG P-R INTERVAL: 208 MS
EKG Q-T INTERVAL: 386 MS
EKG QRS DURATION: 76 MS
EKG QTC CALCULATION (BAZETT): 485 MS
EKG R AXIS: 60 DEGREES
EKG T AXIS: 63 DEGREES
EKG VENTRICULAR RATE: 95 BPM
EOSINOPHIL, URINE: 0 % (ref 0–1)
EPITHELIAL CELLS, UA: ABNORMAL /HPF
FILM ARRAY ADENOVIRUS: NORMAL
FILM ARRAY BORDETELLA PERTUSSIS: NORMAL
FILM ARRAY CHLAMYDOPHILIA PNEUMONIAE: NORMAL
FILM ARRAY CORONAVIRUS 229E: NORMAL
FILM ARRAY CORONAVIRUS HKU1: NORMAL
FILM ARRAY CORONAVIRUS NL63: NORMAL
FILM ARRAY CORONAVIRUS OC43: NORMAL
FILM ARRAY INFLUENZA A VIRUS 09H1: NORMAL
FILM ARRAY INFLUENZA A VIRUS H1: NORMAL
FILM ARRAY INFLUENZA A VIRUS H3: NORMAL
FILM ARRAY INFLUENZA A VIRUS: NORMAL
FILM ARRAY INFLUENZA B: NORMAL
FILM ARRAY METAPNEUMOVIRUS: NORMAL
FILM ARRAY MYCOPLASMA PNEUMONIAE: NORMAL
FILM ARRAY PARAINFLUENZA VIRUS 1: NORMAL
FILM ARRAY PARAINFLUENZA VIRUS 2: NORMAL
FILM ARRAY PARAINFLUENZA VIRUS 3: NORMAL
FILM ARRAY PARAINFLUENZA VIRUS 4: NORMAL
FILM ARRAY RESPIRATORY SYNCITIAL VIRUS: NORMAL
FILM ARRAY RHINOVIRUS/ENTEROVIRUS: NORMAL
FOLATE: 3.8 NG/ML (ref 4.8–24.2)
GFR AFRICAN AMERICAN: 4
GFR NON-AFRICAN AMERICAN: 3 ML/MIN/1.73
GLUCOSE BLD-MCNC: 135 MG/DL (ref 74–99)
GLUCOSE BLD-MCNC: 137 MG/DL (ref 74–99)
GLUCOSE BLD-MCNC: 152 MG/DL (ref 74–99)
GLUCOSE URINE: NEGATIVE MG/DL
HBA1C MFR BLD: 6.9 % (ref 4–5.6)
HCO3: 13.7 MMOL/L (ref 22–26)
HCT VFR BLD CALC: 29.6 % (ref 34–48)
HEMOGLOBIN: 9.3 G/DL (ref 11.5–15.5)
HHB: 6.9 % (ref 0–5)
INR BLD: 0.9
KETONES, URINE: 15 MG/DL
LAB: ABNORMAL
LACTIC ACID: 2.2 MMOL/L (ref 0.5–2.2)
LACTIC ACID: 2.8 MMOL/L (ref 0.5–2.2)
LACTIC ACID: 7.9 MMOL/L (ref 0.5–2.2)
LEUKOCYTE ESTERASE, URINE: ABNORMAL
Lab: ABNORMAL
MCH RBC QN AUTO: 32.3 PG (ref 26–35)
MCHC RBC AUTO-ENTMCNC: 31.4 % (ref 32–34.5)
MCV RBC AUTO: 102.8 FL (ref 80–99.9)
METER GLUCOSE: 106 MG/DL (ref 74–99)
METER GLUCOSE: 132 MG/DL (ref 74–99)
METER GLUCOSE: 139 MG/DL (ref 74–99)
METER GLUCOSE: 190 MG/DL (ref 74–99)
METHB: 0.3 % (ref 0–1.5)
MODE: ABNORMAL
NITRITE, URINE: NEGATIVE
O2 CONTENT: 14.2 ML/DL
O2 SATURATION: 93.1 % (ref 92–98.5)
O2HB: 92.5 % (ref 94–97)
OPERATOR ID: 797
OSMOLALITY: 335 MOSM/KG (ref 285–310)
PATIENT TEMP: 37 C
PCO2: 29.9 MMHG (ref 35–45)
PDW BLD-RTO: 14.1 FL (ref 11.5–15)
PH BLOOD GAS: 7.28 (ref 7.35–7.45)
PH UA: 5 (ref 5–9)
PLATELET # BLD: 173 E9/L (ref 130–450)
PMV BLD AUTO: 11.3 FL (ref 7–12)
PO2: 72.6 MMHG (ref 60–100)
POTASSIUM SERPL-SCNC: 5.8 MMOL/L (ref 3.5–5)
POTASSIUM SERPL-SCNC: 6.1 MMOL/L (ref 3.5–5)
POTASSIUM SERPL-SCNC: 6.4 MMOL/L (ref 3.5–5)
PROTEIN UA: 100 MG/DL
PROTHROMBIN TIME: 10.4 SEC (ref 9.3–12.4)
RBC # BLD: 2.88 E12/L (ref 3.5–5.5)
RBC UA: >20 /HPF (ref 0–2)
SODIUM BLD-SCNC: 132 MMOL/L (ref 132–146)
SODIUM BLD-SCNC: 134 MMOL/L (ref 132–146)
SODIUM BLD-SCNC: 137 MMOL/L (ref 132–146)
SOURCE, BLOOD GAS: ABNORMAL
SPECIFIC GRAVITY UA: 1.02 (ref 1–1.03)
THB: 10.9 G/DL (ref 11.5–16.5)
TIME ANALYZED: 547
TROPONIN: 0.05 NG/ML (ref 0–0.03)
UROBILINOGEN, URINE: 0.2 E.U./DL
VITAMIN B-12: 477 PG/ML (ref 211–946)
WBC # BLD: 8.5 E9/L (ref 4.5–11.5)
WBC UA: ABNORMAL /HPF (ref 0–5)

## 2019-04-23 PROCEDURE — 87081 CULTURE SCREEN ONLY: CPT

## 2019-04-23 PROCEDURE — 87486 CHLMYD PNEUM DNA AMP PROBE: CPT

## 2019-04-23 PROCEDURE — 02HV33Z INSERTION OF INFUSION DEVICE INTO SUPERIOR VENA CAVA, PERCUTANEOUS APPROACH: ICD-10-PCS | Performed by: RADIOLOGY

## 2019-04-23 PROCEDURE — 2500000003 HC RX 250 WO HCPCS: Performed by: INTERNAL MEDICINE

## 2019-04-23 PROCEDURE — 85027 COMPLETE CBC AUTOMATED: CPT

## 2019-04-23 PROCEDURE — 36410 VNPNXR 3YR/> PHY/QHP DX/THER: CPT | Performed by: RADIOLOGY

## 2019-04-23 PROCEDURE — 97530 THERAPEUTIC ACTIVITIES: CPT

## 2019-04-23 PROCEDURE — 82746 ASSAY OF FOLIC ACID SERUM: CPT

## 2019-04-23 PROCEDURE — 6370000000 HC RX 637 (ALT 250 FOR IP): Performed by: INTERNAL MEDICINE

## 2019-04-23 PROCEDURE — 2500000003 HC RX 250 WO HCPCS: Performed by: RADIOLOGY

## 2019-04-23 PROCEDURE — 6360000002 HC RX W HCPCS: Performed by: INTERNAL MEDICINE

## 2019-04-23 PROCEDURE — 36415 COLL VENOUS BLD VENIPUNCTURE: CPT

## 2019-04-23 PROCEDURE — 97167 OT EVAL HIGH COMPLEX 60 MIN: CPT

## 2019-04-23 PROCEDURE — 81001 URINALYSIS AUTO W/SCOPE: CPT

## 2019-04-23 PROCEDURE — 36556 INSERT NON-TUNNEL CV CATH: CPT | Performed by: RADIOLOGY

## 2019-04-23 PROCEDURE — 94640 AIRWAY INHALATION TREATMENT: CPT

## 2019-04-23 PROCEDURE — 36592 COLLECT BLOOD FROM PICC: CPT

## 2019-04-23 PROCEDURE — 2580000003 HC RX 258: Performed by: INTERNAL MEDICINE

## 2019-04-23 PROCEDURE — 82805 BLOOD GASES W/O2 SATURATION: CPT

## 2019-04-23 PROCEDURE — C9113 INJ PANTOPRAZOLE SODIUM, VIA: HCPCS | Performed by: INTERNAL MEDICINE

## 2019-04-23 PROCEDURE — 87798 DETECT AGENT NOS DNA AMP: CPT

## 2019-04-23 PROCEDURE — C1751 CATH, INF, PER/CENT/MIDLINE: HCPCS

## 2019-04-23 PROCEDURE — 87633 RESP VIRUS 12-25 TARGETS: CPT

## 2019-04-23 PROCEDURE — C1752 CATH,HEMODIALYSIS,SHORT-TERM: HCPCS

## 2019-04-23 PROCEDURE — 97162 PT EVAL MOD COMPLEX 30 MIN: CPT | Performed by: PHYSICAL THERAPIST

## 2019-04-23 PROCEDURE — 82962 GLUCOSE BLOOD TEST: CPT

## 2019-04-23 PROCEDURE — 2000000000 HC ICU R&B

## 2019-04-23 PROCEDURE — 83930 ASSAY OF BLOOD OSMOLALITY: CPT

## 2019-04-23 PROCEDURE — 87581 M.PNEUMON DNA AMP PROBE: CPT

## 2019-04-23 PROCEDURE — 82607 VITAMIN B-12: CPT

## 2019-04-23 PROCEDURE — 85610 PROTHROMBIN TIME: CPT

## 2019-04-23 PROCEDURE — 6360000004 HC RX CONTRAST MEDICATION: Performed by: RADIOLOGY

## 2019-04-23 PROCEDURE — 80074 ACUTE HEPATITIS PANEL: CPT

## 2019-04-23 PROCEDURE — 83605 ASSAY OF LACTIC ACID: CPT

## 2019-04-23 PROCEDURE — 76000 FLUOROSCOPY <1 HR PHYS/QHP: CPT | Performed by: RADIOLOGY

## 2019-04-23 PROCEDURE — 80048 BASIC METABOLIC PNL TOTAL CA: CPT

## 2019-04-23 PROCEDURE — 90935 HEMODIALYSIS ONE EVALUATION: CPT

## 2019-04-23 PROCEDURE — 87040 BLOOD CULTURE FOR BACTERIA: CPT

## 2019-04-23 PROCEDURE — 93970 EXTREMITY STUDY: CPT

## 2019-04-23 PROCEDURE — 77001 FLUOROGUIDE FOR VEIN DEVICE: CPT | Performed by: RADIOLOGY

## 2019-04-23 PROCEDURE — 97530 THERAPEUTIC ACTIVITIES: CPT | Performed by: PHYSICAL THERAPIST

## 2019-04-23 PROCEDURE — 76937 US GUIDE VASCULAR ACCESS: CPT | Performed by: RADIOLOGY

## 2019-04-23 PROCEDURE — 87088 URINE BACTERIA CULTURE: CPT

## 2019-04-23 PROCEDURE — 84484 ASSAY OF TROPONIN QUANT: CPT

## 2019-04-23 PROCEDURE — 97535 SELF CARE MNGMENT TRAINING: CPT

## 2019-04-23 RX ORDER — SODIUM POLYSTYRENE SULFONATE 15 G/60ML
30 SUSPENSION ORAL; RECTAL ONCE
Status: COMPLETED | OUTPATIENT
Start: 2019-04-23 | End: 2019-04-23

## 2019-04-23 RX ORDER — IPRATROPIUM BROMIDE AND ALBUTEROL SULFATE 2.5; .5 MG/3ML; MG/3ML
1 SOLUTION RESPIRATORY (INHALATION) 4 TIMES DAILY
Status: DISCONTINUED | OUTPATIENT
Start: 2019-04-23 | End: 2019-04-26 | Stop reason: HOSPADM

## 2019-04-23 RX ORDER — IPRATROPIUM BROMIDE AND ALBUTEROL SULFATE 2.5; .5 MG/3ML; MG/3ML
1 SOLUTION RESPIRATORY (INHALATION)
Status: DISCONTINUED | OUTPATIENT
Start: 2019-04-23 | End: 2019-04-23

## 2019-04-23 RX ORDER — HYDROCODONE BITARTRATE AND ACETAMINOPHEN 10; 325 MG/1; MG/1
1 TABLET ORAL EVERY 8 HOURS PRN
COMMUNITY

## 2019-04-23 RX ORDER — LIDOCAINE HYDROCHLORIDE 10 MG/ML
5 INJECTION, SOLUTION INFILTRATION; PERINEURAL ONCE
Status: COMPLETED | OUTPATIENT
Start: 2019-04-23 | End: 2019-04-23

## 2019-04-23 RX ORDER — HYDROCODONE BITARTRATE AND ACETAMINOPHEN 10; 325 MG/1; MG/1
1 TABLET ORAL EVERY 8 HOURS PRN
Status: DISCONTINUED | OUTPATIENT
Start: 2019-04-23 | End: 2019-04-26 | Stop reason: HOSPADM

## 2019-04-23 RX ORDER — HEPARIN SODIUM 5000 [USP'U]/ML
5000 INJECTION, SOLUTION INTRAVENOUS; SUBCUTANEOUS EVERY 8 HOURS SCHEDULED
Status: DISCONTINUED | OUTPATIENT
Start: 2019-04-23 | End: 2019-04-23

## 2019-04-23 RX ADMIN — SODIUM POLYSTYRENE SULFONATE 30 G: 15 SUSPENSION ORAL; RECTAL at 01:02

## 2019-04-23 RX ADMIN — INSULIN LISPRO 1 UNITS: 100 INJECTION, SOLUTION INTRAVENOUS; SUBCUTANEOUS at 21:25

## 2019-04-23 RX ADMIN — CALCIUM GLUCONATE 1 G: 94 INJECTION, SOLUTION INTRAVENOUS at 01:00

## 2019-04-23 RX ADMIN — LIDOCAINE HYDROCHLORIDE 10 ML: 10 INJECTION, SOLUTION INFILTRATION; PERINEURAL at 12:14

## 2019-04-23 RX ADMIN — PANTOPRAZOLE SODIUM 40 MG: 40 INJECTION, POWDER, FOR SOLUTION INTRAVENOUS at 08:09

## 2019-04-23 RX ADMIN — IPRATROPIUM BROMIDE AND ALBUTEROL SULFATE 1 AMPULE: .5; 3 SOLUTION RESPIRATORY (INHALATION) at 13:07

## 2019-04-23 RX ADMIN — INSULIN GLARGINE 20 UNITS: 100 INJECTION, SOLUTION SUBCUTANEOUS at 21:24

## 2019-04-23 RX ADMIN — Medication: at 01:02

## 2019-04-23 RX ADMIN — Medication: at 20:23

## 2019-04-23 RX ADMIN — SODIUM POLYSTYRENE SULFONATE 30 G: 15 SUSPENSION ORAL; RECTAL at 06:57

## 2019-04-23 RX ADMIN — IPRATROPIUM BROMIDE AND ALBUTEROL SULFATE 1 AMPULE: .5; 3 SOLUTION RESPIRATORY (INHALATION) at 17:00

## 2019-04-23 RX ADMIN — IOPAMIDOL 20 ML: 612 INJECTION, SOLUTION INTRAVENOUS at 12:15

## 2019-04-23 RX ADMIN — GABAPENTIN 100 MG: 100 CAPSULE ORAL at 21:21

## 2019-04-23 RX ADMIN — HYDROCODONE BITARTRATE AND ACETAMINOPHEN 1 TABLET: 5; 325 TABLET ORAL at 18:33

## 2019-04-23 ASSESSMENT — PAIN DESCRIPTION - PROGRESSION
CLINICAL_PROGRESSION: NOT CHANGED

## 2019-04-23 ASSESSMENT — PAIN SCALES - GENERAL
PAINLEVEL_OUTOF10: 0
PAINLEVEL_OUTOF10: 1
PAINLEVEL_OUTOF10: 5
PAINLEVEL_OUTOF10: 2
PAINLEVEL_OUTOF10: 10
PAINLEVEL_OUTOF10: 0
PAINLEVEL_OUTOF10: 4
PAINLEVEL_OUTOF10: 0

## 2019-04-23 ASSESSMENT — PAIN DESCRIPTION - LOCATION
LOCATION: BACK

## 2019-04-23 ASSESSMENT — PAIN DESCRIPTION - FREQUENCY: FREQUENCY: CONTINUOUS

## 2019-04-23 ASSESSMENT — PAIN DESCRIPTION - PAIN TYPE
TYPE: CHRONIC PAIN

## 2019-04-23 NOTE — PROGRESS NOTES
Date:2019  Patient Name: Joshua Alvarado  MRN: [id number]  : 1960  ROOM #: IC02/IC02-01    Occupational Therapy order received, chart reviewed and evaluation attempted this date. Patient is unavailable for OT evaluation due to being off the unit for a PICC line and temp. Dialysis catheter. Will attempt OT evaluation at a later time. Thank you.    Daniela Palm OTR/L #[id number]

## 2019-04-23 NOTE — PLAN OF CARE
Problem: Pain:  Goal: Pain level will decrease  Description  Pain level will decrease  Outcome: Met This Shift  Goal: Control of acute pain  Description  Control of acute pain  Outcome: Met This Shift     Problem: Falls - Risk of:  Goal: Will remain free from falls  Description  Will remain free from falls  4/23/2019 1135 by Rosenda Ferro RN  Outcome: Met This Shift  4/23/2019 0729 by Monse Jj RN  Outcome: Met This Shift  Goal: Absence of physical injury  Description  Absence of physical injury  4/23/2019 1135 by Rosenda Ferro RN  Outcome: Met This Shift  4/23/2019 0729 by Monse Jj RN  Outcome: Met This Shift     Problem: Risk for Impaired Skin Integrity  Goal: Tissue integrity - skin and mucous membranes  Description  Structural intactness and normal physiological function of skin and  mucous membranes.   4/23/2019 1135 by Rosenda Ferro RN  Outcome: Met This Shift  4/23/2019 0729 by Monse Jj RN  Outcome: Met This Shift     Problem: Skin Integrity:  Goal: Will show no infection signs and symptoms  Description  Will show no infection signs and symptoms  4/23/2019 1135 by Rosenda Ferro RN  Outcome: Met This Shift  4/23/2019 0729 by Monse Jj RN  Outcome: Met This Shift  Goal: Absence of new skin breakdown  Description  Absence of new skin breakdown  4/23/2019 1135 by Rosenda Ferro RN  Outcome: Met This Shift  4/23/2019 0729 by Monse Jj RN  Outcome: Met This Shift

## 2019-04-23 NOTE — PLAN OF CARE
Problem: Increased energy expenditure (NI-1.2)  Goal: Food and/or Nutrient Delivery  Start ONS BID  Description  Individualized approach for food/nutrient provision.   Outcome: Met This Shift

## 2019-04-23 NOTE — PROGRESS NOTES
Room #:   IC02/IC02-01  Patient Name: Manuel Rodriguez    PHYSICAL THERAPY INITIAL EVALUATION    Tentative placement recommendation: subacute rehab    Equipment recommendation: To be determined      Plan of care: Patient will be seen   daily Monday-Friday,  for therapeutic exercise, functional retraining, endurance activities, balance exercises, family and patient education. AM-[facility] Basic Mobility        [facility] Mobility Inpatient   How much difficulty turning over in bed?: A Lot  How much difficulty sitting down on / standing up from a chair with arms?: Unable  How much difficulty moving from lying on back to sitting on side of bed?: A Lot  How much help from another person moving to and from a bed to a chair?: Total  How much help from another person needed to walk in hospital room?: Total  How much help from another person for climbing 3-5 steps with a railing?: Total  AM-[facility] Inpatient Mobility Raw Score : 8  AM-PAC Inpatient T-Scale Score : 28.52  Mobility Inpatient CMS 0-100% Score: 86.62  Mobility Inpatient CMS G-Code Modifier : CM    Order:  EVALUATE AND TREAT    Diagnosis/Problem list:    1. Acute renal failure, unspecified acute renal failure type ([facility]    2. Hyperkalemia    3.  Altered mental status, unspecified altered mental status type        Patient Active Problem List   Diagnosis    COPD (chronic obstructive pulmonary disease) ([facility]    Morbid obesity with BMI of 40.0-44.9, adult ([address]    Diabetes mellitus type 2, uncontrolled ([address]    History of DVT (deep vein thrombosis)    Essential hypertension    FÁTIMA (obstructive sleep apnea)    Chronic back pain    CHF (congestive heart failure), NYHA class I, acute on chronic, combined ([address]    Leg swelling    [facility] renal failure ([facility]F) [facility]       Past medical history:       Diagnosis Date    Arthritis     Back pain     Bladder infection     Blood transfusion 2001    Cervical facet syndrome 9/1/2012    Chest pain     CHF (congestive heart failure), NYHA class I, acute on chronic, combined ([facility] 6/25/2018    COPD (chronic obstructive pulmonary disease) (HCC)     Degenerative disc disease, cervical     Degenerative Osteoarthritis of cervical spine 9/1/2012    Degenerative Osteoarthritis of lumbar spine 9/1/2012    Diabetes mellitus, insulin dependent (IDDM), uncontrolled ([facility]     Diabetic Peripheral neuropathy 9/1/2012    Facet syndrome, lumbar 9/1/2012    FHx: migraine headaches     Fibrocystic breast     Generalized headaches     HIGH CHOLESTEROL     Hypertension     Irritable bowel     Kidney disease     Lumbago 6/13/2012    Lumbar radiculopathy Bilateral 9/1/2012    Migraines     Muscle weakness     Poor motor control of trunk     Postlaminectomy syndrome, lumbar region, S/P Fusion (Dr. Karla Manzano, [address] 2001) 6/13/2012    Sinus congestion     Sleep apnea     Type 2 diabetes mellitus without complication ([facility]    ; Procedure Laterality Date    APPENDECTOMY      BACK SURGERY      spinal surgery    BREAST CYST EXCISION      CHOLECYSTECTOMY      FINGER SURGERY  03/2015    from a cat bite from March 12 2015   [address] LUMBAR FUSION  2001    L5-S1 posterior lumbar interbody fusion    SPINE SURGERY      TUBAL LIGATION         The admitting diagnosis and active problem list as listed above have been reviewed prior to the initiation of this evaluation.   Last time out of bed: prior to admit    Precautions: falls, alarm, O2 and behavior , recent PICC LUE, temporary dialysis catheter, jerky mvmts/tremors  Social history: Patient comes from [facility]  Prior Level of Function: Patient required assist  Equipment owned: Equipment at [facility] ,      Mentation: alert, cooperative, oriented x 3 and follows one step directions,      ROM: jerky mvmts, impaired arom  STRENGTH: unable to assess at this time    PAIN: (measured on a visual analog scale with 0=no pain and 10=excruciating pain) pt unable to rate however c/o global pain     FUNCTIONAL ASSESSMENT   Bed Mobility- Supine to sit- Dependent assist x 2        Scooting- Maximal assist x 2     Sit to supine- Maximal assist x 2    Transfers-Sit to stand- not assessed    Gait: not assessed     Balance: sit-poor  l lat lean, jerky mvmts/tremors limit ue assist for support      stand not assessed      Edema: yes - bue/le  Endurance: poor      Treatment:  Therapist educated and facilitated patient on techniques to increase safety and independence with bed mobility, balance, functional transfers, and functional mobility. Sat EOB x 5 minutes to increase dynamic sitting balance and activity tolerance. Patient with lean to L, marked tremors with multiplane trunk instability. Patient demonstrating poor   understanding of education/techniques, requiring additional training/education. At end of session, patient in bed semi chair position x 10 min, min assist to feed lunch, pt unable to feed self due to tremors. Patient placed in long sitting position for improved comfort, nursing notified to complete feeding lunch. Patient intermittently pulling at PICC line nursing aware to dress site. Patient in bed with  Alarm set call light and phone within reach,   all lines and tubes intact, nursing notified. Pt would benefit from continued skilled PT to increase functional independence and quality of life. Rehab Potential: good  -  Patients Goal: rehab      ASSESSMENT  Patient exhibits decreased strength, balance, coordination impairing functional mobility. GOALS to be met in 5 days. Bed mobility-  Moderate assist        Transfers-Sit to stand-Moderate assist x 2   Gait:  Patient to ambulate 10 feet using wheeled walker with Moderate assist x 2  Increase rom in affected joints by 10%  Increase strength in affected mm groups by 1/3 grade  Increase balance to allow for improvement towards functional goals. Increase endurance to allow for improvement towards functional goals. Amada Serrano, PT

## 2019-04-23 NOTE — PROGRESS NOTES
Dr. Tj Segundo notified of patient, current K+, and next scheduled labs. To notify nephrology of next bmp.

## 2019-04-23 NOTE — CARE COORDINATION
SS Note, ICU requested assistance with contact information for pt's family,  called and spoke with pt's mother Carolina Carty, [phone]. Per pt's mother pt's daughter Ritesh Holden can be contacted at [phone], pt's nurse Barron Nieto is aware.   Electronically signed by ANNA Nguyen on 4/23/2019 at 2:37 PM

## 2019-04-23 NOTE — DISCHARGE INSTR - COC
Continuity of Care Form    Patient Name: Ally Gallego   :  1960  MRN:  [id number]    Admit date:  2019  Discharge date:  19    Code Status Order: Full Code   Advance Directives:   [facility] Documentation     Date/Time Healthcare Directive Type of Healthcare Directive Copy in [facility] [address] Agent's Name Healthcare Agent's Phone Number    [phone]  No, patient does not have an advance directive for healthcare treatment -- -- -- -- --          Admitting Physician:  Martinez Reveles DO  PCP: Rox Pichardo DO    Discharging Nurse: Adelina Deras [facility] Unit/Room#: 5356/7080-15  Discharging Unit Phone Number: [phone]    Emergency Contact:   Extended Emergency Contact Information  [facility] Phone: [phone]  Mobile Phone: [phone]  Relation: Child  Secondary Emergency Contact: [address] [place] [address] Phone: [phone]  Relation: Parent    Past Surgical History:  Past Surgical History:   Procedure Laterality Date    APPENDECTOMY      BACK SURGERY      spinal surgery    BREAST CYST EXCISION      CHOLECYSTECTOMY      FINGER SURGERY  2015    from a cat bite from 2015   [address] [place] [address]      LUMBAR FUSION  2001    L5-S1 posterior lumbar interbody fusion    SPINE SURGERY      TUBAL LIGATION         Immunization History:   Immunization History   Administered Date(s) Administered    Influenza Virus Vaccine 10/17/2012, 10/26/2013    Pneumococcal Polysaccharide ([id number]) 10/17/2012       Active Problems:  Patient Active Problem List   Diagnosis Code    COPD [facility]ch[facility] obstructive [facility] disease) [facility] J44.9    Morbid obesity with BMI of 40.0-44.9, adult ([facility] E66.01, Z68.41    Diabetes mellitus type 2, uncontrolled ([facility] E11.65    History of DVT (deep vein thrombosis) Z86.718    Essential hypertension I10    FÁTIMA (obstructive sleep apnea) G47.33    Chronic

## 2019-04-23 NOTE — CONSULTS
Pulmonary/Critical Care Consult Note    CHIEF COMPLAINT: Acute kidney injury, renal failure, metabolic acidosis, chronic back pain on multiple NSAIDs and narcotics, history of diabetes, history of hypertension, history of congestive heart failure    HISTORY OF PRESENT ILLNESS: The patient is a 28-year-old female who is been confined to a nursing home because of back surgery which did not help chronic back pain. The patient had apparently been taking metolazone hydrochlorothiazide and ACE inhibitor and I believe Lasix. She presented to the emergency room disoriented confused and dehydrated. Her creatinine was as high as 11 with a potassium of 7.5. She was treated with  hyperkalemia protocol as well as a bicarbonate drip. She is now in the intensive care unit and I reviewed her care with Dr. Kacy Hyde from the nephrology service. She will be having dialysis later today, and probably on consecutive days ×2 or 3.     ALLERGY:  Niacin and related and Penicillins    FAMILY HISTORY:  Family History   Problem Relation Age of Onset    Heart Surgery Mother     Breast Cancer Sister     Diabetes Brother        SOCIAL HISTORY:  Social History     Socioeconomic History    Marital status: Legally      Spouse name: Not on file    Number of children: 2    Years of education: 12+    Highest education level: Not on file   Occupational History    Not on file   Social Needs    Financial resource strain: Not on file    Food insecurity:     Worry: Not on file     Inability: Not on file    Transportation needs:     Medical: Not on file     Non-medical: Not on file   Tobacco Use    Smoking status: Current Every Day Smoker     Packs/day: 0.25     Years: 33.00     Pack years: 8.25     Types: Cigarettes    Smokeless tobacco: Former User   Substance and Sexual Activity    Alcohol use: No     Comment: social    Drug use: No    Sexual activity: Not Currently   Lifestyle    Physical activity:     Days per week: Not on 30 mg Oral Daily    gabapentin  100 mg Oral Nightly    insulin glargine  20 Units Subcutaneous Nightly    insulin lispro  0-12 Units Subcutaneous TID WC    insulin lispro  0-6 Units Subcutaneous Nightly    pantoprazole  40 mg Intravenous Daily    [Held by provider] metoprolol tartrate  25 mg Oral BID      sodium bicarbonate infusion 150 mL/hr at 04/23/19 0800    dextrose       acetaminophen, glucose, dextrose, glucagon (rDNA), dextrose, ondansetron, morphine, HYDROcodone 5 mg - acetaminophen    REVIEW OF SYSTEMS:  Could not be obtained secondary to patient's confusion      PHYSICAL EXAM:  Vitals:    04/23/19 0900   BP:    Pulse: 78   Resp: 17   Temp:    SpO2: 93%        O2 Flow Rate (L/min): 2 L/min  O2 Device: Nasal cannula    Constitutional: No fever, chills, diaphoresis  Skin: No skin rash, no skin breakdown  HEENT: Unremarkable  Neck: No JVD, lymphadenopathy, thyromegaly  Cardiovascular: S1, S2 normal. No S3-S4 murmurs or rubs present  Respiratory: Clear to auscultation bilaterally  Gastrointestinal: Soft, obese, nontender  Genitourinary: No CVA tenderness  Extremities: No clubbing, cyanosis, or edema  Neurological: Confused, does move all extremities.  Asterixis noted  Psychological: Cannot evaluate    LABS:  WBC   Date Value Ref Range Status   04/22/2019 9.4 4.5 - 11.5 E9/L Final   02/03/2019 7.4 4.5 - 11.5 E9/L Final   09/01/2018 5.8 4.5 - 11.5 E9/L Final     Hemoglobin   Date Value Ref Range Status   04/22/2019 11.5 11.5 - 15.5 g/dL Final   02/03/2019 11.4 (L) 11.5 - 15.5 g/dL Final   09/01/2018 11.3 (L) 11.5 - 15.5 g/dL Final     Hematocrit   Date Value Ref Range Status   04/22/2019 37.2 34.0 - 48.0 % Final   02/03/2019 35.5 34.0 - 48.0 % Final   09/01/2018 34.5 34.0 - 48.0 % Final     MCV   Date Value Ref Range Status   04/22/2019 104.2 (H) 80.0 - 99.9 fL Final   02/03/2019 100.9 (H) 80.0 - 99.9 fL Final   09/01/2018 95.6 80.0 - 99.9 fL Final     Platelets   Date Value Ref Range Status   04/22/2019 229 130 - 450 E9/L Final   02/03/2019 207 130 - 450 E9/L Final   09/01/2018 208 130 - 450 E9/L Final     Sodium   Date Value Ref Range Status   04/23/2019 132 132 - 146 mmol/L Final   04/22/2019 130 (L) 132 - 146 mmol/L Final   04/22/2019 129 (L) 132 - 146 mmol/L Final     Potassium   Date Value Ref Range Status   04/23/2019 6.4 (H) 3.5 - 5.0 mmol/L Final   04/22/2019 7.5 (HH) 3.5 - 5.0 mmol/L Final     Comment:     Specimen is moderately Hemolyzed. Result may be artificially increased.    04/22/2019 7.3 (HH) 3.5 - 5.0 mmol/L Final     Potassium reflex Magnesium   Date Value Ref Range Status   09/01/2018 3.3 (L) 3.5 - 5.0 mmol/L Final   08/31/2018 3.4 (L) 3.5 - 5.0 mmol/L Final   08/30/2018 3.5 3.5 - 5.0 mmol/L Final     Chloride   Date Value Ref Range Status   04/23/2019 92 (L) 98 - 107 mmol/L Final   04/22/2019 94 (L) 98 - 107 mmol/L Final   04/22/2019 87 (L) 98 - 107 mmol/L Final     CO2   Date Value Ref Range Status   04/23/2019 12 (L) 22 - 29 mmol/L Final   04/22/2019 5 (LL) 22 - 29 mmol/L Final   04/22/2019 12 (L) 22 - 29 mmol/L Final     BUN   Date Value Ref Range Status   04/23/2019 111 (H) 6 - 20 mg/dL Final   04/22/2019 116 (H) 6 - 20 mg/dL Final   04/22/2019 120 (H) 6 - 20 mg/dL Final     CREATININE   Date Value Ref Range Status   04/23/2019 11.5 (HH) 0.5 - 1.0 mg/dL Final   04/22/2019 11.2 (HH) 0.5 - 1.0 mg/dL Final   04/22/2019 11.9 (HH) 0.5 - 1.0 mg/dL Final     POC Creatinine   Date Value Ref Range Status   04/22/2019 12.0 (HH) 0.5 - 1.0 mg/dL Final     Glucose   Date Value Ref Range Status   04/23/2019 137 (H) 74 - 99 mg/dL Final   04/22/2019 168 (H) 74 - 99 mg/dL Final   04/22/2019 130 (H) 74 - 99 mg/dL Final   09/22/2011 85 70 - 110 mg/dL Final   01/29/2011 360 (H) 70 - 110 mg/dL Final   01/03/2011 226 (H) 70 - 110 mg/dL Final     Calcium   Date Value Ref Range Status   04/23/2019 8.6 8.6 - 10.2 mg/dL Final   04/22/2019 8.6 8.6 - 10.2 mg/dL Final   04/22/2019 9.2 8.6 - 10.2 mg/dL Final     Total 04/23/19  0547   PH 7.278*   PO2 72.6   PCO2 29.9*   HCO3 13.7*   BE -11.8*   O2SAT 93.1       RADIOLOGY:  XR ABDOMEN (KUB) (SINGLE AP VIEW)   Final Result   1. No acute abdominal abnormality. XR CHEST STANDARD (2 VW)   Final Result   No acute cardiopulmonary disease. CT Head WO Contrast   Final Result   1. No acute intracranial hemorrhage or mass effect. 2. Small area of encephalomalacia within the high right parietal lobe. IR Interventional Radiology Procedure Request    (Results Pending)   XR CHEST PORTABLE    (Results Pending)       IMPRESSION:  1. Acute kidney injury resulting in acute renal failure (previous creatinine was 0.9 several months ago)  2. Metabolic acidosis  3. Chronic back pain  4. History of CHF  5. History of hypertension  6. Change in mental status likely uremia related    PLAN:  1. Continue IV bicarbonate  2. Dialysis at least today and tomorrow  3. Monitor urine output closely  4. Check labs carefully  5. DVT prophylaxis  6. Labs chest x-ray ABGs in a.m. Thank you very much for the opportunity to participate in the care of this interesting and challenging patient. ATTESTATION:  ICU Staff Physician note of personal involvement in Care  As the attending physician, I certify that I personally reviewed the patients history and personally examined the patient to confirm the physical findings described above,  And that I reviewed the relevant imaging studies and available reports. I also discussed the differential diagnosis and all of the proposed management plans with the patient and individuals accompanying the patient to this visit. They had the opportunity to ask questions about the proposed management plans and to have those questions answered. This patient has a high probability of sudden, clinically significant deterioration, which requires the highest level of physician preparedness to intervene urgently.   I managed/supervised life or organ

## 2019-04-23 NOTE — PROGRESS NOTES
Room #:   IC02/IC02-01  Date: 2019       Patient Name: Laura Menjivar  : 1960      MRN: [id number]     Patient unavailable for physical therapy eval due to off floor to get PICC line and temporary dialysis catheter Will attempt PT evaluation at a later time. Thank you.        Jesus Scott, PT

## 2019-04-23 NOTE — CONSULTS
[address]                                  CONSULTATION    PATIENT NAME: Venecia Osman                      :        1960  MED REC NO:   [id number]                            ROOM:       IC02  ACCOUNT NO:   [de-identified]                           ADMIT DATE: 2019  PROVIDER:     Aryan Pierre MD    CONSULT DATE:  2019    HISTORY OF PRESENT ILLNESS:  The patient is a 65-year-old woman who was  sent in from the nursing home because of worsening obtundation and  confusion. She was found to have a creatinine that had increased to  11.9 from a baseline of 0.9 back in February and her potassium was noted  to be 7.3. She had a profound metabolic acidosis, possibly related to  metformin or her renal failure. She has been given some bicarbonate,  Kayexalate, calcium, dextrose and insulin with some improvement, and her  potassium is down to 5.4. [place] was inserted. No significant postvoid  was documented. She remains confused, does admit to some weight loss,  had been having issues with persistent edema of her lower extremities,  and apparently has been on an aggressive diuretic regimen in the setting  of ACE inhibition for her CHF with probable decompensation resulting in  her acute kidney injury. She had been getting some nonsteroidals  previously, but on the med sheet from the nursing home, no nonsteroidals  were noted. PAST MEDICAL HISTORY:  Includes a history of type 2 diabetes with  significant neuropathy. She has had history of hypertension,  hyperlipidemia, history of spinal stenosis with some surgery back in   with persistent chronic back pain that has recently required  nursing home placement for management. She has been bedbound.   History  of cholecystectomy, appendectomy, chronic obstructive airways disease  related to tobacco, history of DVT, and history of congestive chemistries. Hopefully, she turns  out to be prerenal.  No documented hypertension has been recorded. 3.  History of metabolic acidosis. Appears to be increased anion gap,  probably related to renal failure, may have lactic acid because of her  metformin therapy. 4.  History of type 2 diabetes with neuropathy. 5.  History of hypernatremia. Unclear if this is related to thiazide or  if she has true hypernatremia. Suspicious that this may be related to  diuretic therapy. We will sent off urine and serum osmolality to  confirm tonicity. 6.  History of hypertension. 7.  History of hyperlipidemia. 8.  History of spinal stenosis with back surgery back in 2000 with  ongoing issues with pain management. 9.  History of cholecystectomy, appendectomy, DVT, and CHF. She will be  started on bicarb drip, D5 half-normal, 75 mEq of sodium bicarb to be  run at 100 an hour. She will have urine and serum osmolality as well as  spot urine sodium and creatinine and lactic acid and uric acid. Hopefully, her renal function is able to be managed medically. Otherwise, we will need to initiate dialysis. She is already having  asterixis, although some of this may be related to her Neurontin dosing  as this tend to cause similar presentation in renal failure.         Jocelyn Wolf MD    D: 04/22/2019 18:21:45       T: 04/22/2019 23:26:04     [email or web address]  Job#: [id number]     Doc#: [id number]    CC:

## 2019-04-23 NOTE — CARE COORDINATION
Discharge Planning: Per [facility], [address] [place] [address], fax [phone], pt is a long term resident and bed hold, NO PRECERT required for pt to return. Electronic SANTINO in pt's EPIC Chart for physician signature.    Electronically signed by ANNA Colbert on 4/23/2019 at 9:47 AM

## 2019-04-23 NOTE — PROGRESS NOTES
Occupational Therapy  Occupational Therapy Initial Assessment    Date:2019  Patient Name: Rosy Curtis  MRN: [id number]  : 1960  ROOM #: IC02/IC02-01    Placement Recommendation: Subacute    Equipment Prescriptions Needed: TBD at rehab    [facility]   AM-PAC Daily Activity Inpatient   How much help for putting on and taking off regular lower body clothing?: Total  How much help for Bathing?: Total  How much help for Toileting?: Total  How much help for putting on and taking off regular upper body clothing?: Total  How much help for taking care of personal grooming?: Total  How much help for eating meals?: Total  AM-PAC Inpatient Daily Activity Raw Score: 6  AM-PAC Inpatient ADL T-Scale Score : 17.07  ADL Inpatient CMS 0-100% Score: 100  ADL Inpatient CMS G-Code Modifier : CN    Diagnosis:   1. Acute renal failure, unspecified acute renal failure type ([address]    2. Hyperkalemia    3.  Altered mental status, unspecified altered mental status type      Past Medical History:   Past Medical History:   Diagnosis Date    Arthritis     Back pain     Bladder infection     Blood transfusion     Cervical facet syndrome 2012    Chest pain     CHF (congestive heart failure), NYHA class I, acute on chronic, combined ([address] 2018    COPD (chronic obstructive pulmonary disease) (HCC)     Degenerative disc disease, cervical     Degenerative Osteoarthritis of cervical spine 2012    Degenerative Osteoarthritis of lumbar spine 2012    Diabetes mellitus, insulin dependent (IDDM), uncontrolled ([address]     Diabetic Peripheral neuropathy 2012    Facet syndrome, lumbar 2012    FHx: migraine headaches     Fibrocystic breast     Generalized headaches     HIGH CHOLESTEROL     Hypertension     Irritable bowel     Kidney disease     Lumbago 2012    Lumbar radiculopathy Bilateral 2012    Migraines     Muscle weakness     Poor motor control of trunk     Postlaminectomy syndrome, poor    Endurance: poor       Assessment of Current Deficits:   [x]Functional mobility  [x]ROM  [x]Strength   [x]Cognition   [x]Safety Awareness    [x]ADLs [x]IADLs   [x]Endurance  [x]Balance    []Vision  [x]Sensation     [x]Gross Motor Coordination       [x]Fine Motor Coordination     · High Complexity   · History: Extensive review of medical records and additional review of physical, cognitive, or psychosocial history related to current functional performance. · Exam: 5 or more performance deficits  · Assistance/Modification: Significant assistance or modifications required to perform tasks. Have comorbidities that affect occupational performance. Patients Goal: get better    Treatment: OT eval, bed mobility, sitting balance at EOB for 5 minutes with maximal assist to maintain upright posture, multiplane instability, jerky movements, pt returned to bed, maximal assist x 2 to scoot up in bed while in trendelenburg, pt then placed in chair position for 10 minutes. Pt requested returning to bed position d/t back pain. HOB elevated. Assistance needed for container management and to bring food to mouth. All needs within reach. Bed alarm on. Rehab Potential: good   Plan of care: Patient will be seen by OT 1-3 times a week for therapeutic activity, ADL re-training, bed mobility, functional transfers, functional mobility, safety and fall prevention, balance and endurance activities, instruction in energy conservation principles, and patient/family education. Patient and/or family understands diagnosis, prognosis, education and plan of care. Pt/family verbalized understanding.      Time Code treatment minutes: [address] OTR/L #[id number]

## 2019-04-23 NOTE — PROGRESS NOTES
Department of Internal Medicine  Nephrology Attending Progress Note        SUBJECTIVE:  The patient speech better, not much urine output despite iv fluids, lactic level elevated at 11.9 from metformin, fr ex of sodium 8.4, fr ex of urea 39% , serum osmolality not picked up? Urine osmolality 344  Physical Exam:    Vitals:    04/23/19 0800   BP: (!) 140/59   Pulse: 79   Resp: 22   Temp: 98 °F (36.7 °C)   SpO2: 91%       I/O last 24 hours:  Intake/Output inaccurate, only 100 cc of urine despite iv hydration    Weight: 231 on admission    General Appearance:  awake, alert, oriented, in mild distress  Skin:  Dusky toes  Neck:  neck- supple, no mass, non-tender  Lungs:  Distant breath sounds  Heart:  Heart regular rate and rhythm     Abdominal: Abdomen soft, non-tender. BS normal. No masses,  No organomegaly  Extremities: Extremities warm to touch, pink, with no edema.   Peripheral Pulses:  +2    DATA:    CBC with Differential:    Lab Results   Component Value Date    WBC 9.4 04/22/2019    RBC 3.57 04/22/2019    HGB 11.5 04/22/2019    HCT 37.2 04/22/2019     04/22/2019    .2 04/22/2019    MCH 32.2 04/22/2019    MCHC 30.9 04/22/2019    RDW 13.9 04/22/2019    SEGSPCT 58 09/26/2013    LYMPHOPCT 13.6 04/22/2019    MONOPCT 3.2 04/22/2019    BASOPCT 0.6 04/22/2019    MONOSABS 0.30 04/22/2019    LYMPHSABS 1.28 04/22/2019    EOSABS 0.12 04/22/2019    BASOSABS 0.06 04/22/2019     BMP:    Lab Results   Component Value Date     04/23/2019    K 6.4 04/23/2019    K 3.3 09/01/2018    CL 92 04/23/2019    CO2 12 04/23/2019     04/23/2019    LABALBU 3.8 04/22/2019    CREATININE 11.5 04/23/2019    CALCIUM 8.6 04/23/2019    GFRAA 4 04/23/2019    LABGLOM 3 04/23/2019    GLUCOSE 137 04/23/2019    GLUCOSE 85 09/22/2011     Magnesium:    Lab Results   Component Value Date    MG 1.9 09/01/2018     Phosphorus:  No results found for: PHOS       ipratropium-albuterol  1 ampule Inhalation Q4H WA    aspirin  324 mg Oral Once    DULoxetine  30 mg Oral Daily    gabapentin  100 mg Oral Nightly    insulin glargine  20 Units Subcutaneous Nightly    insulin lispro  0-12 Units Subcutaneous TID WC    insulin lispro  0-6 Units Subcutaneous Nightly    pantoprazole  40 mg Intravenous Daily    [Held by provider] metoprolol tartrate  25 mg Oral BID      sodium bicarbonate infusion 150 mL/hr at 04/23/19 0800    dextrose       acetaminophen, glucose, dextrose, glucagon (rDNA), dextrose, ondansetron, morphine, HYDROcodone 5 mg - acetaminophen    IMPRESSION/RECOMMENDATIONS:      DOLORES not making much improvement despite hydration, will plan to initiate dialysis, need to repeat urine chemistries  Acidosis improving   Hyperkalemia remains problematic should improve with dialysis  Hyponatremia, serum osmolality not picked up as ordered      Romeo Maurer MD  4/23/2019 8:55 AM

## 2019-04-23 NOTE — PROGRESS NOTES
Patient is not oriented so called nursing home for emergency contacts. They did have some numbers but they are not patients normal consents. Parent? Eileen Hess [phone]/ daughter Miesha Corona [phone]. Attempted to call both numbers with no answer. A message was left for asuncion.

## 2019-04-23 NOTE — FLOWSHEET NOTE
04/23/19 1805   Vital Signs   /72   Temp 97.7 °F (36.5 °C)   Pulse 94   Weight 246 lb 14.6 oz (112 kg)   Weight Method Bed scale   Percent Weight Change 0   Pain Assessment   Pain Assessment 0-10   Pain Level 0   Post-Hemodialysis Assessment   Post-Treatment Procedures Blood returned;Catheter Capped, clamped with Saline x2 ports   Machine Disinfection Process Acid/Vinegar Clean;Exterior Machine Disinfection;Bleach   Rinseback Volume (ml) 200 ml   Total Liters Processed (l/min) 42.7 l/min   Dialyzer Clearance Lightly streaked   Duration of Treatment (minutes) 210 minutes   Hemodialysis Intake (ml) 400 ml   Hemodialysis Output (ml) 606 ml   NET Removed (ml) 206 ml   Tolerated Treatment Good   Bilateral Breath Sounds Diminished   Edema None   Physician Notified?  No

## 2019-04-24 ENCOUNTER — APPOINTMENT (OUTPATIENT)
Dept: GENERAL RADIOLOGY | Age: 59
DRG: 682 | End: 2019-04-24
Payer: COMMERCIAL

## 2019-04-24 LAB
ABO/RH: NORMAL
ALBUMIN SERPL-MCNC: 2.7 G/DL (ref 3.5–5.2)
ALP BLD-CCNC: 63 U/L (ref 35–104)
ALT SERPL-CCNC: 8 U/L (ref 0–32)
AMMONIA: 20 UMOL/L (ref 11–51)
ANION GAP SERPL CALCULATED.3IONS-SCNC: 13 MMOL/L (ref 7–16)
ANTIBODY SCREEN: NORMAL
AST SERPL-CCNC: 9 U/L (ref 0–31)
B.E.: 10.9 MMOL/L (ref -3–3)
BASOPHILS ABSOLUTE: 0.02 E9/L (ref 0–0.2)
BASOPHILS RELATIVE PERCENT: 0.4 % (ref 0–2)
BILIRUB SERPL-MCNC: 0.3 MG/DL (ref 0–1.2)
BUN BLDV-MCNC: 54 MG/DL (ref 6–20)
CALCIUM SERPL-MCNC: 7.5 MG/DL (ref 8.6–10.2)
CHLORIDE BLD-SCNC: 93 MMOL/L (ref 98–107)
CO2: 32 MMOL/L (ref 22–29)
COHB: 0.3 % (ref 0–1.5)
CREAT SERPL-MCNC: 6.5 MG/DL (ref 0.5–1)
CRITICAL: ABNORMAL
DATE ANALYZED: ABNORMAL
DATE OF COLLECTION: ABNORMAL
EOSINOPHILS ABSOLUTE: 0.08 E9/L (ref 0.05–0.5)
EOSINOPHILS RELATIVE PERCENT: 1.7 % (ref 0–6)
GFR AFRICAN AMERICAN: 8
GFR NON-AFRICAN AMERICAN: 7 ML/MIN/1.73
GLUCOSE BLD-MCNC: 147 MG/DL (ref 74–99)
HAV IGM SER IA-ACNC: NORMAL
HCO3: 34 MMOL/L (ref 22–26)
HCT VFR BLD CALC: 24.3 % (ref 34–48)
HCT VFR BLD CALC: 25.7 % (ref 34–48)
HEMOGLOBIN: 8.3 G/DL (ref 11.5–15.5)
HEMOGLOBIN: 8.6 G/DL (ref 11.5–15.5)
HEPATITIS B CORE IGM ANTIBODY: NORMAL
HEPATITIS B SURFACE ANTIGEN INTERPRETATION: NORMAL
HEPATITIS C ANTIBODY INTERPRETATION: NORMAL
HHB: 4.9 % (ref 0–5)
IMMATURE GRANULOCYTES #: 0.02 E9/L
IMMATURE GRANULOCYTES %: 0.4 % (ref 0–5)
LAB: ABNORMAL
LACTIC ACID: 1.6 MMOL/L (ref 0.5–2.2)
LV EF: 55 %
LVEF MODALITY: NORMAL
LYMPHOCYTES ABSOLUTE: 1.27 E9/L (ref 1.5–4)
LYMPHOCYTES RELATIVE PERCENT: 26.5 % (ref 20–42)
Lab: ABNORMAL
MAGNESIUM: 2.1 MG/DL (ref 1.6–2.6)
MCH RBC QN AUTO: 32.4 PG (ref 26–35)
MCHC RBC AUTO-ENTMCNC: 34.2 % (ref 32–34.5)
MCV RBC AUTO: 94.9 FL (ref 80–99.9)
METER GLUCOSE: 138 MG/DL (ref 74–99)
METER GLUCOSE: 143 MG/DL (ref 74–99)
METHB: 0 % (ref 0–1.5)
MODE: ABNORMAL
MONOCYTES ABSOLUTE: 0.24 E9/L (ref 0.1–0.95)
MONOCYTES RELATIVE PERCENT: 5 % (ref 2–12)
MRSA CULTURE ONLY: NORMAL
NEUTROPHILS ABSOLUTE: 3.17 E9/L (ref 1.8–7.3)
NEUTROPHILS RELATIVE PERCENT: 66 % (ref 43–80)
O2 CONTENT: 13 ML/DL
O2 SATURATION: 95.1 % (ref 92–98.5)
O2HB: 94.8 % (ref 94–97)
OPERATOR ID: 901
PATIENT TEMP: 37
PCO2: 38.9 MMHG (ref 35–45)
PDW BLD-RTO: 13.8 FL (ref 11.5–15)
PH BLOOD GAS: 7.56 (ref 7.35–7.45)
PHOSPHORUS: 4.3 MG/DL (ref 2.5–4.5)
PLATELET # BLD: 133 E9/L (ref 130–450)
PMV BLD AUTO: 10.5 FL (ref 7–12)
PO2: 75.3 MMHG (ref 60–100)
POTASSIUM SERPL-SCNC: 3.7 MMOL/L (ref 3.5–5)
RBC # BLD: 2.56 E12/L (ref 3.5–5.5)
SODIUM BLD-SCNC: 138 MMOL/L (ref 132–146)
SOURCE, BLOOD GAS: ABNORMAL
THB: 9.7 G/DL (ref 11.5–16.5)
TIME ANALYZED: 549
TOTAL PROTEIN: 5.2 G/DL (ref 6.4–8.3)
WBC # BLD: 4.8 E9/L (ref 4.5–11.5)

## 2019-04-24 PROCEDURE — 85014 HEMATOCRIT: CPT

## 2019-04-24 PROCEDURE — 6360000002 HC RX W HCPCS: Performed by: INTERNAL MEDICINE

## 2019-04-24 PROCEDURE — 2700000000 HC OXYGEN THERAPY PER DAY

## 2019-04-24 PROCEDURE — 2580000003 HC RX 258: Performed by: INTERNAL MEDICINE

## 2019-04-24 PROCEDURE — 36600 WITHDRAWAL OF ARTERIAL BLOOD: CPT

## 2019-04-24 PROCEDURE — 90935 HEMODIALYSIS ONE EVALUATION: CPT | Performed by: INTERNAL MEDICINE

## 2019-04-24 PROCEDURE — 83735 ASSAY OF MAGNESIUM: CPT

## 2019-04-24 PROCEDURE — 93306 TTE W/DOPPLER COMPLETE: CPT

## 2019-04-24 PROCEDURE — 6370000000 HC RX 637 (ALT 250 FOR IP): Performed by: FAMILY MEDICINE

## 2019-04-24 PROCEDURE — 82140 ASSAY OF AMMONIA: CPT

## 2019-04-24 PROCEDURE — 86850 RBC ANTIBODY SCREEN: CPT

## 2019-04-24 PROCEDURE — 97530 THERAPEUTIC ACTIVITIES: CPT

## 2019-04-24 PROCEDURE — 6370000000 HC RX 637 (ALT 250 FOR IP): Performed by: INTERNAL MEDICINE

## 2019-04-24 PROCEDURE — 71045 X-RAY EXAM CHEST 1 VIEW: CPT

## 2019-04-24 PROCEDURE — 84100 ASSAY OF PHOSPHORUS: CPT

## 2019-04-24 PROCEDURE — 94640 AIRWAY INHALATION TREATMENT: CPT

## 2019-04-24 PROCEDURE — 97110 THERAPEUTIC EXERCISES: CPT

## 2019-04-24 PROCEDURE — 86900 BLOOD TYPING SEROLOGIC ABO: CPT

## 2019-04-24 PROCEDURE — 80053 COMPREHEN METABOLIC PANEL: CPT

## 2019-04-24 PROCEDURE — 83921 ORGANIC ACID SINGLE QUANT: CPT

## 2019-04-24 PROCEDURE — 85025 COMPLETE CBC W/AUTO DIFF WBC: CPT

## 2019-04-24 PROCEDURE — 86901 BLOOD TYPING SEROLOGIC RH(D): CPT

## 2019-04-24 PROCEDURE — 2500000003 HC RX 250 WO HCPCS: Performed by: INTERNAL MEDICINE

## 2019-04-24 PROCEDURE — 82962 GLUCOSE BLOOD TEST: CPT

## 2019-04-24 PROCEDURE — 85018 HEMOGLOBIN: CPT

## 2019-04-24 PROCEDURE — 36592 COLLECT BLOOD FROM PICC: CPT

## 2019-04-24 PROCEDURE — 83605 ASSAY OF LACTIC ACID: CPT

## 2019-04-24 PROCEDURE — 2000000000 HC ICU R&B

## 2019-04-24 PROCEDURE — 82805 BLOOD GASES W/O2 SATURATION: CPT

## 2019-04-24 PROCEDURE — C9113 INJ PANTOPRAZOLE SODIUM, VIA: HCPCS | Performed by: INTERNAL MEDICINE

## 2019-04-24 PROCEDURE — 36415 COLL VENOUS BLD VENIPUNCTURE: CPT

## 2019-04-24 RX ORDER — FLUTICASONE PROPIONATE 50 MCG
1 SPRAY, SUSPENSION (ML) NASAL DAILY
Status: DISCONTINUED | OUTPATIENT
Start: 2019-04-24 | End: 2019-04-26 | Stop reason: HOSPADM

## 2019-04-24 RX ORDER — SODIUM CHLORIDE 0.9 % (FLUSH) 0.9 %
SYRINGE (ML) INJECTION
Status: DISPENSED
Start: 2019-04-24 | End: 2019-04-25

## 2019-04-24 RX ORDER — HEPARIN SODIUM 5000 [USP'U]/ML
5000 INJECTION, SOLUTION INTRAVENOUS; SUBCUTANEOUS EVERY 8 HOURS SCHEDULED
Status: DISCONTINUED | OUTPATIENT
Start: 2019-04-24 | End: 2019-04-26 | Stop reason: HOSPADM

## 2019-04-24 RX ORDER — LACTOBACILLUS RHAMNOSUS GG 10B CELL
1 CAPSULE ORAL DAILY
Status: DISCONTINUED | OUTPATIENT
Start: 2019-04-24 | End: 2019-04-26 | Stop reason: HOSPADM

## 2019-04-24 RX ORDER — FOLIC ACID 1 MG/1
1 TABLET ORAL DAILY
Status: DISCONTINUED | OUTPATIENT
Start: 2019-04-24 | End: 2019-04-26 | Stop reason: HOSPADM

## 2019-04-24 RX ORDER — HEPARIN SODIUM (PORCINE) LOCK FLUSH IV SOLN 100 UNIT/ML 100 UNIT/ML
SOLUTION INTRAVENOUS
Status: DISPENSED
Start: 2019-04-24 | End: 2019-04-25

## 2019-04-24 RX ADMIN — Medication: at 04:31

## 2019-04-24 RX ADMIN — IPRATROPIUM BROMIDE AND ALBUTEROL SULFATE 1 AMPULE: .5; 3 SOLUTION RESPIRATORY (INHALATION) at 09:34

## 2019-04-24 RX ADMIN — HYDROCODONE BITARTRATE AND ACETAMINOPHEN 1 TABLET: 10; 325 TABLET ORAL at 06:38

## 2019-04-24 RX ADMIN — FOLIC ACID 1 MG: 1 TABLET ORAL at 15:43

## 2019-04-24 RX ADMIN — HYDROCODONE BITARTRATE AND ACETAMINOPHEN 1 TABLET: 10; 325 TABLET ORAL at 15:43

## 2019-04-24 RX ADMIN — GABAPENTIN 100 MG: 100 CAPSULE ORAL at 21:21

## 2019-04-24 RX ADMIN — Medication 1 CAPSULE: at 15:43

## 2019-04-24 RX ADMIN — INSULIN LISPRO 1 UNITS: 100 INJECTION, SOLUTION INTRAVENOUS; SUBCUTANEOUS at 21:53

## 2019-04-24 RX ADMIN — PANTOPRAZOLE SODIUM 40 MG: 40 INJECTION, POWDER, FOR SOLUTION INTRAVENOUS at 09:29

## 2019-04-24 RX ADMIN — IPRATROPIUM BROMIDE AND ALBUTEROL SULFATE 1 AMPULE: .5; 3 SOLUTION RESPIRATORY (INHALATION) at 13:26

## 2019-04-24 RX ADMIN — FLUTICASONE PROPIONATE 1 SPRAY: 50 SPRAY, METERED NASAL at 15:45

## 2019-04-24 RX ADMIN — HEPARIN SODIUM 5000 UNITS: 5000 INJECTION, SOLUTION INTRAVENOUS; SUBCUTANEOUS at 21:21

## 2019-04-24 RX ADMIN — INSULIN GLARGINE 20 UNITS: 100 INJECTION, SOLUTION SUBCUTANEOUS at 21:53

## 2019-04-24 RX ADMIN — HEPARIN SODIUM 5000 UNITS: 5000 INJECTION, SOLUTION INTRAVENOUS; SUBCUTANEOUS at 15:43

## 2019-04-24 ASSESSMENT — PAIN DESCRIPTION - ORIENTATION: ORIENTATION: RIGHT;LEFT

## 2019-04-24 ASSESSMENT — PAIN DESCRIPTION - PAIN TYPE: TYPE: CHRONIC PAIN

## 2019-04-24 ASSESSMENT — PAIN DESCRIPTION - LOCATION: LOCATION: BACK

## 2019-04-24 ASSESSMENT — PAIN SCALES - GENERAL
PAINLEVEL_OUTOF10: 8
PAINLEVEL_OUTOF10: 9

## 2019-04-24 ASSESSMENT — PAIN DESCRIPTION - DESCRIPTORS: DESCRIPTORS: SORE;SHARP;PRESSURE

## 2019-04-24 NOTE — PROGRESS NOTES
Pulmonary/Critical Care Progress Note    We are following patient for acute kidney injury, renal failure metabolic acidosis, resolving diabetes mellitus, hypertension, history of CHF, chronic back pain    SUBJECTIVE:  Patient was dialyzed yesterday and is being dialyzed again today. Her encephalopathy has cleared to a great extent and she no longer has asterixis. Her electrolytes are much better. She did not have a great deal of fluid removed yesterday largely due to her urine output. I suspect that although she has made over 800 mL over night shift last night, the urine is isosthenuric. MEDICATIONS:   heparin (porcine)  5,000 Units Subcutaneous 3 times per day    folic acid  1 mg Oral Daily    ipratropium-albuterol  1 ampule Inhalation 4x daily    aspirin  324 mg Oral Once    gabapentin  100 mg Oral Nightly    insulin glargine  20 Units Subcutaneous Nightly    insulin lispro  0-12 Units Subcutaneous TID WC    insulin lispro  0-6 Units Subcutaneous Nightly    pantoprazole  40 mg Intravenous Daily    [Held by provider] metoprolol tartrate  25 mg Oral BID      dextrose       perflutren lipid microspheres, HYDROcodone-acetaminophen, acetaminophen, glucose, dextrose, glucagon (rDNA), dextrose, morphine      REVIEW OF SYSTEMS:  Constitutional: Denies fever, weight loss, night sweats, and fatigue  Skin: Denies pigmentation, dark lesions, and rashes   HEENT: Denies hearing loss, tinnitus, ear drainage, epistaxis, sore throat, and hoarseness. Cardiovascular: Denies palpitations, chest pain, and chest pressure. Respiratory: Denies cough, dyspnea at rest, hemoptysis, apnea, and choking.   Gastrointestinal: Denies nausea, vomiting, poor appetite, diarrhea, heartburn or reflux  Genitourinary: Denies dysuria, frequency, urgency or hematuria  Musculoskeletal: Denies myalgias, muscle weakness, and bone pain  Neurological: Denies dizziness, vertigo, headache, and focal weakness  Psychological: Denies anxiety and depression  Endocrine: Denies heat intolerance and cold intolerance  Hematopoietic/Lymphatic: Denies bleeding problems and blood transfusions    OBJECTIVE:  Vitals:    04/24/19 1000   BP: (!) 78/65   Pulse: 89   Resp: 22   Temp:    SpO2: 97%        O2 Flow Rate (L/min): 2 L/min  O2 Device: None (Room air)    PHYSICAL EXAM:  Constitutional: No fever, chills, diaphoresis  Skin: No skin rashes, no skin breakdown  HEENT: Unremarkable  Neck: No JVD, lymphadenopathy, thyromegaly  Cardiovascular: S1 and S2 regular. No S3-S4 murmurs or rubs  Respiratory: Clear bilaterally to auscultation  Gastrointestinal: Soft, mildly obese, nontender  Genitourinary: No CVA tenderness  Extremities: No clubbing, cyanosis, or edema  Neurological: Awake alert oriented ×3. No evidence of focal motor sensory deficits.  No asterixis noted  Psychological: Appropriate affect    LABS:  WBC   Date Value Ref Range Status   04/24/2019 4.8 4.5 - 11.5 E9/L Final   04/23/2019 8.5 4.5 - 11.5 E9/L Final   04/22/2019 9.4 4.5 - 11.5 E9/L Final     Hemoglobin   Date Value Ref Range Status   04/24/2019 8.3 (L) 11.5 - 15.5 g/dL Final   04/23/2019 9.3 (L) 11.5 - 15.5 g/dL Final   04/22/2019 11.5 11.5 - 15.5 g/dL Final     Hematocrit   Date Value Ref Range Status   04/24/2019 24.3 (L) 34.0 - 48.0 % Final   04/23/2019 29.6 (L) 34.0 - 48.0 % Final   04/22/2019 37.2 34.0 - 48.0 % Final     MCV   Date Value Ref Range Status   04/24/2019 94.9 80.0 - 99.9 fL Final   04/23/2019 102.8 (H) 80.0 - 99.9 fL Final   04/22/2019 104.2 (H) 80.0 - 99.9 fL Final     Platelets   Date Value Ref Range Status   04/24/2019 133 130 - 450 E9/L Final   04/23/2019 173 130 - 450 E9/L Final   04/22/2019 229 130 - 450 E9/L Final     Sodium   Date Value Ref Range Status   04/24/2019 138 132 - 146 mmol/L Final   04/23/2019 137 132 - 146 mmol/L Final   04/23/2019 134 132 - 146 mmol/L Final     Potassium   Date Value Ref Range Status   04/24/2019 3.7 3.5 - 5.0 mmol/L Final   04/23/2019 5.8 (H) 3.5 - 5.0 mmol/L Final   04/23/2019 6.1 (H) 3.5 - 5.0 mmol/L Final     Potassium reflex Magnesium   Date Value Ref Range Status   09/01/2018 3.3 (L) 3.5 - 5.0 mmol/L Final   08/31/2018 3.4 (L) 3.5 - 5.0 mmol/L Final   08/30/2018 3.5 3.5 - 5.0 mmol/L Final     Chloride   Date Value Ref Range Status   04/24/2019 93 (L) 98 - 107 mmol/L Final   04/23/2019 92 (L) 98 - 107 mmol/L Final   04/23/2019 91 (L) 98 - 107 mmol/L Final     CO2   Date Value Ref Range Status   04/24/2019 32 (H) 22 - 29 mmol/L Final   04/23/2019 18 (L) 22 - 29 mmol/L Final   04/23/2019 16 (L) 22 - 29 mmol/L Final     BUN   Date Value Ref Range Status   04/24/2019 54 (H) 6 - 20 mg/dL Final   04/23/2019 111 (H) 6 - 20 mg/dL Final   04/23/2019 111 (H) 6 - 20 mg/dL Final     CREATININE   Date Value Ref Range Status   04/24/2019 6.5 (H) 0.5 - 1.0 mg/dL Final   04/23/2019 11.7 (HH) 0.5 - 1.0 mg/dL Final   04/23/2019 11.4 (HH) 0.5 - 1.0 mg/dL Final     Glucose   Date Value Ref Range Status   04/24/2019 147 (H) 74 - 99 mg/dL Final   04/23/2019 135 (H) 74 - 99 mg/dL Final   04/23/2019 152 (H) 74 - 99 mg/dL Final   09/22/2011 85 70 - 110 mg/dL Final   01/29/2011 360 (H) 70 - 110 mg/dL Final   01/03/2011 226 (H) 70 - 110 mg/dL Final     Calcium   Date Value Ref Range Status   04/24/2019 7.5 (L) 8.6 - 10.2 mg/dL Final   04/23/2019 8.5 (L) 8.6 - 10.2 mg/dL Final   04/23/2019 8.3 (L) 8.6 - 10.2 mg/dL Final     Total Protein   Date Value Ref Range Status   04/24/2019 5.2 (L) 6.4 - 8.3 g/dL Final   04/22/2019 7.0 6.4 - 8.3 g/dL Final   09/01/2018 6.3 (L) 6.4 - 8.3 g/dL Final     Alb   Date Value Ref Range Status   04/24/2019 2.7 (L) 3.5 - 5.2 g/dL Final   04/22/2019 3.8 3.5 - 5.2 g/dL Final   09/01/2018 3.5 3.5 - 5.2 g/dL Final     Total Bilirubin   Date Value Ref Range Status   04/24/2019 0.3 0.0 - 1.2 mg/dL Final   04/22/2019 0.2 0.0 - 1.2 mg/dL Final   09/01/2018 <0.2 0.0 - 1.2 mg/dL Final     Alkaline Phosphatase   Date Value Ref Range Status   04/24/2019 63 35 -

## 2019-04-24 NOTE — PROGRESS NOTES
Physical Therapy  Physical Therapy  Daily Treatment Note  4/24/2019  IC02/IC02-01                      Sang Meraz   [id number]                              1960    Patient Active Problem List   Diagnosis    [facility]) [facility]    Morbid obesity with BMI of 40.0-44.9, adult ([facility]    Diabetes mellitus type 2, uncontrolled ([facility]    History of DVT (deep vein thrombosis)    Essential hypertension    FÁTIMA (obstructive sleep apnea)    Chronic back pain    CHF (congestive heart failure), NYHA class I, acute on chronic, combined ([facility]    Leg swelling    Acute renal failure (ARF) ([facility]       Recommendation for discharge: Subacute  Equipment prescriptions needed:to be determined    [facility] Mobility Inpatient   How much difficulty turning over in bed?: A Little  How much difficulty sitting down on / standing up from a chair with arms?: A Lot  How much difficulty moving from lying on back to sitting on side of bed?: A Little  How much help from another person moving to and from a bed to a chair?: Total  How much help from another person needed to walk in hospital room?: Total  How much help from another person for climbing 3-5 steps with a railing?: Total  AM-PAC Inpatient Mobility Raw Score : 11  AM-PAC Inpatient T-Scale Score : 33.86  Mobility Inpatient CMS 0-100% Score: 72.57  Mobility Inpatient CMS G-Code Modifier : CL      Precautions: falls, alarm, behavior, jerky movements/tremors    S: Patient cleared by nursing for treatment. Patient is agreeable to treatment. Pt c/o pain with her entire spine. Pain status: (measured on a visual analog scale with 0=no pain and 10=excruciating pain) no number given/10.    O: Pt was instructed in and performed the following:   Bed Mobility- Supine to sit-Minimal assists x 2     Scooting- Minimal Assist x 2    Sit to supine-Minimal assists x 2   Transfers-sit to stand- Moderate assistance x 2     Gait: NA   Steps: NA  Treatment: Pt performed supine ex's: ankle pumps, heel slides, SLR, hip abduction, hip adduction, shld, elbow, wrist, flex/ext, grasp/relax,  x 10 - 15 reps. Verbal/tactile cues for increased participation and ROM. Pt transferred to EOB, sat on EOB for 13 minutes and stood for 2 reps. Comment: Call light left by patient. RTB at end of tx session with RN Steven Metz) present. A: Pt needing decreased assistance with bed mobility and able to sit on EOB with Supervision. Pt able to stand with assist x 2 for safety but could not take any steps. Pt stated she performed a stand-pivot transfer to a w/c with therapy at [facility]. P: Continue with physical therapy   TORRES DUBON, PTA   GOALS to be met in 5 days. Bed mobility-  Moderate assist                                               Transfers-Sit to stand-Moderate assist x 2              Gait:  Patient to ambulate 10 feet using wheeled walker with Moderate assist x 2  Increase rom in affected joints by 10%  Increase strength in affected mm groups by 1/3 grade  Increase balance to allow for improvement towards functional goals. Increase endurance to allow for improvement towards functional goals.

## 2019-04-24 NOTE — PROGRESS NOTES
Name: Regina Anna  : 1960  MRN: [id number]  Room: [place] [address]  DOS: 2019    Internal Medicine Resident Progress Note    PCP: Bela Hinds DO  Admitting Physician: Imtiaz Gavin DO    Chief Complaint:    Chief Complaint   Patient presents with    Abdominal Pain     started this am    Nausea    Emesis       Subjective     Teresa Bergman was seen and examined at bedside today. Patient alert and mentation has improved. States back pain is better controlled. Denies nausea/emesis.          Hospital Medications  Current Facility-Administered Medications   Medication Dose Route Frequency Provider Last Rate Last Dose    sodium bicarbonate 150 mEq in dextrose 5 % 1,000 mL infusion   Intravenous Continuous Ashley Rivera  mL/hr at 19 0431      ipratropium-albuterol (DUONEB) nebulizer solution 1 ampule  1 ampule Inhalation 4x daily Ha Salazar MD   1 ampule at 19 1700    perflutren lipid microspheres (DEFINITY) injection 1.65 mg  1.5 mL Intravenous ONCE PRN Ha Salazar MD        HYDROcodone-acetaminophen [facility])  MG per tablet 1 tablet  1 tablet Oral Q8H PRN Js Beny, DO   1 tablet at 19 0279    aspirin chewable tablet 324 mg  324 mg Oral Once Beauford Colt, DO        acetaminophen (TYLENOL) tablet 650 mg  650 mg Oral Q6H PRN Luevenia Copper, DO        gabapentin (NEURONTIN) capsule 100 mg  100 mg Oral Nightly Luevenia Copper, DO   100 mg at 19    insulin glargine (LANTUS) injection vial 20 Units  20 Units Subcutaneous Nightly Luevenia Copper, DO   20 Units at 19    glucose (GLUTOSE) 40 % oral gel 15 g  15 g Oral PRN Luevenia Copper, DO        dextrose 50 % solution 12.5 g  12.5 g Intravenous PRN Luevenia Copper, DO        glucagon (rDNA) injection 1 mg  1 mg Intramuscular PRN Luevenia Copper, DO        dextrose 5 % solution  100 mL/hr Intravenous PRN Luevenia Copper, DO        insulin lispro (HUMALOG) injection intake/output data recorded. General: Awake, alert and Oriented x 3, NAD  Head: Normocephalic, atraumatic  Eyes: Conjunctivae/corneas clear, Sclera non icteric  Lungs: CTAB. No RRW.   Heart: RRR, - MGR, +S1, S2  Abdomen: Soft, non-tender; bowel sounds normal; no masses, no organomegaly  Extremities: extremities atraumatic, no cyanosis, no clubbing, peripheral pulses equal  Neurologic: aox2, 2-3/5 in lower extremities  [place] catheter in place  Bilateral lower extremity edema  Right ij tlc  Left picc line    Pertinent/New Labs and Imaging Studies     Recent Results (from the past 24 hour(s))   Troponin    Collection Time: 04/23/19  9:13 AM   Result Value Ref Range    Troponin 0.05 (H) 0.00 - 0.03 ng/mL   Basic metabolic panel    Collection Time: 04/23/19  9:13 AM   Result Value Ref Range    Sodium 134 132 - 146 mmol/L    Potassium 6.1 (H) 3.5 - 5.0 mmol/L    Chloride 91 (L) 98 - 107 mmol/L    CO2 16 (L) 22 - 29 mmol/L    Anion Gap 27 (H) 7 - 16 mmol/L    Glucose 152 (H) 74 - 99 mg/dL     (H) 6 - 20 mg/dL    CREATININE 11.4 (HH) 0.5 - 1.0 mg/dL    GFR Non-African American 3 >=60 mL/min/1.73    GFR African American 4     Calcium 8.3 (L) 8.6 - 10.2 mg/dL   Protime-INR    Collection Time: 04/23/19  9:13 AM   Result Value Ref Range    Protime 10.4 9.3 - 12.4 sec    INR 0.9    Osmolality, Serum    Collection Time: 04/23/19  9:13 AM   Result Value Ref Range    Osmolality 335 (H) 285 - 310 mOsm/Kg   CBC    Collection Time: 04/23/19  9:13 AM   Result Value Ref Range    WBC 8.5 4.5 - 11.5 E9/L    RBC 2.88 (L) 3.50 - 5.50 E12/L    Hemoglobin 9.3 (L) 11.5 - 15.5 g/dL    Hematocrit 29.6 (L) 34.0 - 48.0 %    .8 (H) 80.0 - 99.9 fL    MCH 32.3 26.0 - 35.0 pg    MCHC 31.4 (L) 32.0 - 34.5 %    RDW 14.1 11.5 - 15.0 fL    Platelets 404 815 - 509 E9/L    MPV 11.3 7.0 - 12.0 fL   Basic metabolic panel    Collection Time: 04/23/19 12:29 PM   Result Value Ref Range    Sodium 137 132 - 146 mmol/L    Potassium 5.8 (H) 3.5 - 5.0 mmol/L    Chloride 92 (L) 98 - 107 mmol/L    CO2 18 (L) 22 - 29 mmol/L    Anion Gap 27 (H) 7 - 16 mmol/L    Glucose 135 (H) 74 - 99 mg/dL     (H) 6 - 20 mg/dL    CREATININE 11.7 (HH) 0.5 - 1.0 mg/dL    GFR Non-African American 3 >=60 mL/min/1.73    GFR African American 4     Calcium 8.5 (L) 8.6 - 10.2 mg/dL   Respiratory Panel, Film Array    Collection Time: 04/23/19 12:30 PM   Result Value Ref Range    Film Array Adenovirus       Result: Not Detected  *  *  Normal Range: Not Detected      Film Array Coronavirus HKU1       Result: Not Detected  *  *  Normal Range: Not Detected      Film Array Conoravirus NL63       Result: Not Detected  *  *  Normal Range: Not Detected      Film Array Coronavirus 229E       Result: Not Detected  *  *  Normal Range: Not Detected      Film Array Coronavirus OC43       Result: Not Detected  *  *  Normal Range: Not Detected      Film Array Influenza A Virus       Result: Not Detected  *  *  Normal Range: Not Detected      Film Array Influenza A Virus H1       Result: Not Detected  *  *  Normal Range: Not Detected      Film Array Influenza A Virus 09H1       Result: Not Detected  *  *  Normal Range: Not Detected      Film Array Influenza A Virus H3       Result: Not Detected  *  *  Normal Range: Not Detected      Film Array Influenza B       Result: Not Detected  *  *  Normal Range: Not Detected      Film Array Metapneumovirus       Result: Not Detected  *  *  Normal Range: Not Detected      Film Array Parainfluenza Virus 1       Result: Not Detected  *  *  Normal Range: Not Detected      Film Array Parainfluenza Virus 2       Result: Not Detected  *  *  Normal Range: Not Detected      Film Array Parainfluenza Virus 3       Result: Not Detected  *  *  Normal Range: Not Detected      Film Array Parainfluenza Virus 4       Result: Not Detected  *  *  Normal Range: Not Detected      Film Array Respiratory Syncitial Virus       Result: Not Detected  *  *  Normal Range: Not Detected Film Array Rhinovirus/Enterovirus       Result: Not Detected  *  *  Normal Range: Not Detected      Film Array Bordetella Pertusis       Result: Not Detected  *  *  Normal Range: Not Detected      Film Array Chlamydophilia Pneumoniae       Result: Not Detected  *  *  Normal Range: Not Detected      Film Array Mycoplasma Pneumoniae       Result: Not Detected  *  *  Normal Range: Not Detected     POCT Glucose    Collection Time: 04/23/19 12:35 PM   Result Value Ref Range    Meter Glucose 139 (H) 74 - 99 mg/dL   POCT Glucose    Collection Time: 04/23/19  5:16 PM   Result Value Ref Range    Meter Glucose 106 (H) 74 - 99 mg/dL   Lactic acid, plasma    Collection Time: 04/23/19  6:50 PM   Result Value Ref Range    Lactic Acid 2.8 (H) 0.5 - 2.2 mmol/L   POCT Glucose    Collection Time: 04/23/19  9:11 PM   Result Value Ref Range    Meter Glucose 190 (H) 74 - 99 mg/dL   Lactic acid, plasma    Collection Time: 04/23/19 10:50 PM   Result Value Ref Range    Lactic Acid 2.2 0.5 - 2.2 mmol/L   Lactic acid, plasma    Collection Time: 04/24/19  5:30 AM   Result Value Ref Range    Lactic Acid 1.6 0.5 - 2.2 mmol/L   Blood Gas, Arterial    Collection Time: 04/24/19  5:49 AM   Result Value Ref Range    Date Analyzed [id number]     Time Analyzed 0587     Source: Blood Arterial     pH, Blood Gas 7.559 (H) 7.350 - 7.450    PCO2 38.9 35.0 - 45.0 mmHg    PO2 75.3 60.0 - 100.0 mmHg    HCO3 34.0 (H) 22.0 - 26.0 mmol/L    B.E. 10.9 (H) -3.0 - 3.0 mmol/L    O2 Sat 95.1 92.0 - 98.5 %    O2Hb 94.8 94.0 - 97.0 %    COHb 0.3 0.0 - 1.5 %    MetHb 0.0 0.0 - 1.5 %    O2 Content 13.0 mL/dL    HHb 4.9 0.0 - 5.0 %    tHb (est) 9.7 (L) 11.5 - 16.5 g/dL    Mode NC- 2 L     Date Of Collection      Time Collected      Pt Temp 37      ID [id number]     Lab [id number]     Critical(s) Notified .  No Critical Values    Magnesium    Collection Time: 04/24/19  5:50 AM   Result Value Ref Range    Magnesium 2.1 1.6 - 2.6 mg/dL   Phosphorus    Collection Time: 04/24/19 5:50 AM   Result Value Ref Range    Phosphorus 4.3 2.5 - 4.5 mg/dL   Comprehensive Metabolic Panel    Collection Time: 04/24/19  5:50 AM   Result Value Ref Range    Sodium 138 132 - 146 mmol/L    Potassium 3.7 3.5 - 5.0 mmol/L    Chloride 93 (L) 98 - 107 mmol/L    CO2 32 (H) 22 - 29 mmol/L    Anion Gap 13 7 - 16 mmol/L    Glucose 147 (H) 74 - 99 mg/dL    BUN 54 (H) 6 - 20 mg/dL    CREATININE 6.5 (H) 0.5 - 1.0 mg/dL    GFR Non-African American 7 >=60 mL/min/1.73    GFR African American 8     Calcium 7.5 (L) 8.6 - 10.2 mg/dL    Total Protein 5.2 (L) 6.4 - 8.3 g/dL    Alb 2.7 (L) 3.5 - 5.2 g/dL    Total Bilirubin 0.3 0.0 - 1.2 mg/dL    Alkaline Phosphatase 63 35 - 104 U/L    ALT 8 0 - 32 U/L    AST 9 0 - 31 U/L   CBC Auto Differential    Collection Time: 04/24/19  5:50 AM   Result Value Ref Range    WBC 4.8 4.5 - 11.5 E9/L    RBC 2.56 (L) 3.50 - 5.50 E12/L    Hemoglobin 8.3 (L) 11.5 - 15.5 g/dL    Hematocrit 24.3 (L) 34.0 - 48.0 %    MCV 94.9 80.0 - 99.9 fL    MCH 32.4 26.0 - 35.0 pg    MCHC 34.2 32.0 - 34.5 %    RDW 13.8 11.5 - 15.0 fL    Platelets 438 534 - 484 E9/L    MPV 10.5 7.0 - 12.0 fL    Neutrophils % 66.0 43.0 - 80.0 %    Immature Granulocytes % 0.4 0.0 - 5.0 %    Lymphocytes % 26.5 20.0 - 42.0 %    Monocytes % 5.0 2.0 - 12.0 %    Eosinophils % 1.7 0.0 - 6.0 %    Basophils % 0.4 0.0 - 2.0 %    Neutrophils # 3.17 1.80 - 7.30 E9/L    Immature Granulocytes # 0.02 E9/L    Lymphocytes # 1.27 (L) 1.50 - 4.00 E9/L    Monocytes # 0.24 0.10 - 0.95 E9/L    Eosinophils # 0.08 0.05 - 0.50 E9/L    Basophils # 0.02 0.00 - 0.20 E9/L   Ammonia    Collection Time: 04/24/19  5:50 AM   Result Value Ref Range    Ammonia 20.0 11.0 - 51.0 umol/L       Microbiology      Radiology  Xr Chest Standard (2 Vw)    Result Date: 4/22/2019  Reading location: [facility] Indication: Hypoxia, shortness of breath. Comparison: Prior chest radiograph from 2/3/2019 Technique: Portable AP upright and lateral chest radiograph obtained. Findings:  The cardiomediastinal silhouette is stable in size and contours. Bilateral lungs and costophrenic angles are clear. There is no evidence of pneumothorax. No acute cardiopulmonary disease. Xr Abdomen (kub) (single Ap View)    Result Date: 4/22/2019  LOCATION: 200 EXAM: XR ABDOMEN (KUB) (SINGLE AP VIEW) COMPARISON: None HISTORY: Abdominal pain TECHNIQUE: Supine view  of the abdomen. FINDINGS: No focally dilated loops or free air is visualized. No free air seen on supine view. No abdominal calcifications present. Lung bases are clear. 1. No acute abdominal abnormality. Ct Head Wo Contrast    Result Date: 4/22/2019  Location: 200 Indication: Altered mental status. Comparison: CT head from 12/7/2012. Technique: Multidetector CT imaging was obtained from the skull base to vertex without the administration of intravenous contrast. Coronal and sagittal reformatted images were obtained. Automated dose exposure control was used for this exam. FINDINGS: Prominence of ventricles and sulci is compatible with age-related volume loss. No extra-axial collection. No acute intracranial hemorrhage. No cerebral edema or mass effect. No CT evidence of acute, large territorial infarction. There is a small area of encephalomalacia within the high right parietal lobe. Visualized paranasal sinuses are well aerated. Visualized mastoid air cells are well aerated. The calvarium is intact. 1. No acute intracranial hemorrhage or mass effect. 2. Small area of encephalomalacia within the high right parietal lobe. Assessment and Plan     Active Hospital Problems    Diagnosis Date Noted    Acute renal failure (ARF) ([facility] [N17.9] 04/22/2019       Assessment  1. Acute renal failure/ATN  2. Hyperkalemia-improved with treatment in the ED  3. Acute metabolic encephalopathy- multifactorial with medication, arf, ?infection  4. Right parietal lobe encephalomalacia  5. Acute hypoxic respiratory failure   6.  Acute exacerbation of COPD  7. Acute anemia without overt bleed  8. Chronic back pain with hx of postlaminectomy syndrome  9. gastroenteritis   10. Metabolic acidosis  11. Insulin-dependent diabetes mellitus type II  12. Hypertension  13. Sleep apnea  14. HLD  15. Tobacco abuse         Plan  Right ij temp hd catheter and left picc line placed yesterday. Dialysis yesterday. Urine output increased. repeat dialysis today per nephrology. Mental status has improved. Continues to have diarrhea. No nausea/emesis Pt/ot consulted. Heparin for dvt prophylaxis. See orders for further plan of care. Patient has been seen, examined, and discussed with Dr. Tamara Zamora on rounds    Libby Mendoza D.O., PGY3  4/24/2019  8:22 AM     The chart was reviewed and the patient was seen and examined. The case was discussed in detail with the resident and agree with current impressions and plan.     Jose Shaikh D.O.  3:20 PM  4/24/2019

## 2019-04-24 NOTE — PROGRESS NOTES
Department of Internal Medicine  Nephrology Attending Progress Note        SUBJECTIVE:  The patient complaining of diarrhea, swelling in rt arm remains with poor appetite, serum osmolality was drawn several hrs post admission not consistent with hypotonic hyponatremia, some increase in urine outputs    Physical Exam:    Vitals:    04/24/19 0700   BP: (!) 93/51   Pulse: 65   Resp: 19   Temp:    SpO2: 97%       I/O last 24 hours:  Intake/Output 4457/1760    Weight: 239    General Appearance:  awake, alert, oriented, in mild distress  Skin:  Dusky toes  Neck:  neck- supple, no mass, non-tender  Lungs:  Distant breath sounds  Heart:  Heart regular rate and rhythm     Abdominal: Abdomen soft, non-tender. BS normal. No masses,  No organomegaly  Extremities: Extremities warm to touch, pink, with no edema.   Peripheral Pulses:  +2        DATA:    CBC with Differential:    Lab Results   Component Value Date    WBC 4.8 04/24/2019    RBC 2.56 04/24/2019    HGB 8.3 04/24/2019    HCT 24.3 04/24/2019     04/24/2019    MCV 94.9 04/24/2019    MCH 32.4 04/24/2019    MCHC 34.2 04/24/2019    RDW 13.8 04/24/2019    SEGSPCT 58 09/26/2013    LYMPHOPCT 26.5 04/24/2019    MONOPCT 5.0 04/24/2019    BASOPCT 0.4 04/24/2019    MONOSABS 0.24 04/24/2019    LYMPHSABS 1.27 04/24/2019    EOSABS 0.08 04/24/2019    BASOSABS 0.02 04/24/2019     BMP:    Lab Results   Component Value Date     04/24/2019    K 3.7 04/24/2019    K 3.3 09/01/2018    CL 93 04/24/2019    CO2 32 04/24/2019    BUN 54 04/24/2019    LABALBU 2.7 04/24/2019    CREATININE 6.5 04/24/2019    CALCIUM 7.5 04/24/2019    GFRAA 8 04/24/2019    LABGLOM 7 04/24/2019    GLUCOSE 147 04/24/2019    GLUCOSE 85 09/22/2011     Magnesium:    Lab Results   Component Value Date    MG 2.1 04/24/2019     Phosphorus:    Lab Results   Component Value Date    PHOS 4.3 04/24/2019        heparin (porcine)  5,000 Units Subcutaneous 3 times per day    ipratropium-albuterol  1 ampule Inhalation 4x daily    aspirin  324 mg Oral Once    gabapentin  100 mg Oral Nightly    insulin glargine  20 Units Subcutaneous Nightly    insulin lispro  0-12 Units Subcutaneous TID WC    insulin lispro  0-6 Units Subcutaneous Nightly    pantoprazole  40 mg Intravenous Daily    [Held by provider] metoprolol tartrate  25 mg Oral BID      dextrose       perflutren lipid microspheres, HYDROcodone-acetaminophen, acetaminophen, glucose, dextrose, glucagon (rDNA), dextrose, morphine    IMPRESSION/RECOMMENDATIONS:         DOLORES some increase in urine output, will be for dialysis again today  Acidosis improving will stop iv bicarbonate  Hyperkalemia resolved  Hyponatremia resolved most likely from diuretic therapy  Diarrhea etiology           Jonas Olszewski, MD  4/24/2019 9:01 AM

## 2019-04-25 LAB
ALBUMIN SERPL-MCNC: 2.8 G/DL (ref 3.5–5.2)
ALP BLD-CCNC: 70 U/L (ref 35–104)
ALT SERPL-CCNC: 8 U/L (ref 0–32)
ANION GAP SERPL CALCULATED.3IONS-SCNC: 7 MMOL/L (ref 7–16)
AST SERPL-CCNC: 10 U/L (ref 0–31)
BASOPHILS ABSOLUTE: 0.03 E9/L (ref 0–0.2)
BASOPHILS RELATIVE PERCENT: 0.6 % (ref 0–2)
BILIRUB SERPL-MCNC: 0.3 MG/DL (ref 0–1.2)
BUN BLDV-MCNC: 23 MG/DL (ref 6–20)
CALCIUM SERPL-MCNC: 8.5 MG/DL (ref 8.6–10.2)
CHLORIDE BLD-SCNC: 100 MMOL/L (ref 98–107)
CO2: 33 MMOL/L (ref 22–29)
CREAT SERPL-MCNC: 3.3 MG/DL (ref 0.5–1)
EOSINOPHILS ABSOLUTE: 0.18 E9/L (ref 0.05–0.5)
EOSINOPHILS RELATIVE PERCENT: 3.8 % (ref 0–6)
GFR AFRICAN AMERICAN: 17
GFR NON-AFRICAN AMERICAN: 14 ML/MIN/1.73
GLUCOSE BLD-MCNC: 82 MG/DL (ref 74–99)
HCT VFR BLD CALC: 26 % (ref 34–48)
HEMOGLOBIN: 8.5 G/DL (ref 11.5–15.5)
IMMATURE GRANULOCYTES #: 0.02 E9/L
IMMATURE GRANULOCYTES %: 0.4 % (ref 0–5)
LYMPHOCYTES ABSOLUTE: 1.65 E9/L (ref 1.5–4)
LYMPHOCYTES RELATIVE PERCENT: 34.4 % (ref 20–42)
MAGNESIUM: 1.9 MG/DL (ref 1.6–2.6)
MCH RBC QN AUTO: 31.7 PG (ref 26–35)
MCHC RBC AUTO-ENTMCNC: 32.7 % (ref 32–34.5)
MCV RBC AUTO: 97 FL (ref 80–99.9)
METER GLUCOSE: 135 MG/DL (ref 74–99)
METER GLUCOSE: 172 MG/DL (ref 74–99)
METER GLUCOSE: 286 MG/DL (ref 74–99)
METER GLUCOSE: 76 MG/DL (ref 74–99)
METHYLMALONIC ACID: 0.22 UMOL/L (ref 0–0.4)
MONOCYTES ABSOLUTE: 0.29 E9/L (ref 0.1–0.95)
MONOCYTES RELATIVE PERCENT: 6 % (ref 2–12)
NEUTROPHILS ABSOLUTE: 2.63 E9/L (ref 1.8–7.3)
NEUTROPHILS RELATIVE PERCENT: 54.8 % (ref 43–80)
PDW BLD-RTO: 14 FL (ref 11.5–15)
PHOSPHORUS: 2.8 MG/DL (ref 2.5–4.5)
PLATELET # BLD: 122 E9/L (ref 130–450)
PMV BLD AUTO: 11 FL (ref 7–12)
POTASSIUM SERPL-SCNC: 3.5 MMOL/L (ref 3.5–5)
RBC # BLD: 2.68 E12/L (ref 3.5–5.5)
SODIUM BLD-SCNC: 140 MMOL/L (ref 132–146)
TOTAL PROTEIN: 5.3 G/DL (ref 6.4–8.3)
URIC ACID, SERUM: 12.4 MG/DL (ref 2.4–5.7)
URINE CULTURE, ROUTINE: NORMAL
WBC # BLD: 4.8 E9/L (ref 4.5–11.5)

## 2019-04-25 PROCEDURE — 36592 COLLECT BLOOD FROM PICC: CPT

## 2019-04-25 PROCEDURE — C9113 INJ PANTOPRAZOLE SODIUM, VIA: HCPCS | Performed by: INTERNAL MEDICINE

## 2019-04-25 PROCEDURE — 82962 GLUCOSE BLOOD TEST: CPT

## 2019-04-25 PROCEDURE — 6360000002 HC RX W HCPCS: Performed by: INTERNAL MEDICINE

## 2019-04-25 PROCEDURE — 6370000000 HC RX 637 (ALT 250 FOR IP): Performed by: INTERNAL MEDICINE

## 2019-04-25 PROCEDURE — 80053 COMPREHEN METABOLIC PANEL: CPT

## 2019-04-25 PROCEDURE — 94640 AIRWAY INHALATION TREATMENT: CPT

## 2019-04-25 PROCEDURE — 97530 THERAPEUTIC ACTIVITIES: CPT

## 2019-04-25 PROCEDURE — 84100 ASSAY OF PHOSPHORUS: CPT

## 2019-04-25 PROCEDURE — 6370000000 HC RX 637 (ALT 250 FOR IP): Performed by: FAMILY MEDICINE

## 2019-04-25 PROCEDURE — 85025 COMPLETE CBC W/AUTO DIFF WBC: CPT

## 2019-04-25 PROCEDURE — 1200000000 HC SEMI PRIVATE

## 2019-04-25 PROCEDURE — 36415 COLL VENOUS BLD VENIPUNCTURE: CPT

## 2019-04-25 PROCEDURE — 97110 THERAPEUTIC EXERCISES: CPT

## 2019-04-25 PROCEDURE — 83735 ASSAY OF MAGNESIUM: CPT

## 2019-04-25 PROCEDURE — 2700000000 HC OXYGEN THERAPY PER DAY

## 2019-04-25 RX ORDER — POTASSIUM CHLORIDE 20 MEQ/1
20 TABLET, EXTENDED RELEASE ORAL 2 TIMES DAILY WITH MEALS
Status: DISPENSED | OUTPATIENT
Start: 2019-04-25 | End: 2019-04-26

## 2019-04-25 RX ORDER — ALLOPURINOL 100 MG/1
100 TABLET ORAL DAILY
Status: DISCONTINUED | OUTPATIENT
Start: 2019-04-25 | End: 2019-04-26 | Stop reason: HOSPADM

## 2019-04-25 RX ORDER — PANTOPRAZOLE SODIUM 40 MG/1
40 TABLET, DELAYED RELEASE ORAL
Status: DISCONTINUED | OUTPATIENT
Start: 2019-04-26 | End: 2019-04-26 | Stop reason: HOSPADM

## 2019-04-25 RX ORDER — COLCHICINE 0.6 MG/1
0.6 TABLET ORAL DAILY
Status: DISCONTINUED | OUTPATIENT
Start: 2019-04-25 | End: 2019-04-26 | Stop reason: CLARIF

## 2019-04-25 RX ADMIN — ALLOPURINOL 100 MG: 100 TABLET ORAL at 16:10

## 2019-04-25 RX ADMIN — FOLIC ACID 1 MG: 1 TABLET ORAL at 09:20

## 2019-04-25 RX ADMIN — INSULIN LISPRO 3 UNITS: 100 INJECTION, SOLUTION INTRAVENOUS; SUBCUTANEOUS at 20:51

## 2019-04-25 RX ADMIN — FLUTICASONE PROPIONATE 1 SPRAY: 50 SPRAY, METERED NASAL at 16:09

## 2019-04-25 RX ADMIN — PANTOPRAZOLE SODIUM 40 MG: 40 INJECTION, POWDER, FOR SOLUTION INTRAVENOUS at 09:20

## 2019-04-25 RX ADMIN — Medication 1 CAPSULE: at 09:20

## 2019-04-25 RX ADMIN — IPRATROPIUM BROMIDE AND ALBUTEROL SULFATE 1 AMPULE: .5; 3 SOLUTION RESPIRATORY (INHALATION) at 13:48

## 2019-04-25 RX ADMIN — DARBEPOETIN ALFA 60 MCG: 60 SOLUTION INTRAVENOUS; SUBCUTANEOUS at 20:50

## 2019-04-25 RX ADMIN — HEPARIN SODIUM 5000 UNITS: 5000 INJECTION, SOLUTION INTRAVENOUS; SUBCUTANEOUS at 21:56

## 2019-04-25 RX ADMIN — HEPARIN SODIUM 5000 UNITS: 5000 INJECTION, SOLUTION INTRAVENOUS; SUBCUTANEOUS at 05:39

## 2019-04-25 RX ADMIN — GABAPENTIN 100 MG: 100 CAPSULE ORAL at 20:51

## 2019-04-25 RX ADMIN — INSULIN GLARGINE 20 UNITS: 100 INJECTION, SOLUTION SUBCUTANEOUS at 20:51

## 2019-04-25 RX ADMIN — COLCHICINE 0.6 MG: 0.6 TABLET, FILM COATED ORAL at 16:09

## 2019-04-25 RX ADMIN — IPRATROPIUM BROMIDE AND ALBUTEROL SULFATE 1 AMPULE: .5; 3 SOLUTION RESPIRATORY (INHALATION) at 17:44

## 2019-04-25 RX ADMIN — HYDROCODONE BITARTRATE AND ACETAMINOPHEN 1 TABLET: 10; 325 TABLET ORAL at 09:20

## 2019-04-25 RX ADMIN — HYDROCODONE BITARTRATE AND ACETAMINOPHEN 1 TABLET: 10; 325 TABLET ORAL at 01:12

## 2019-04-25 RX ADMIN — POTASSIUM CHLORIDE 20 MEQ: 20 TABLET, EXTENDED RELEASE ORAL at 17:56

## 2019-04-25 RX ADMIN — HYDROCODONE BITARTRATE AND ACETAMINOPHEN 1 TABLET: 10; 325 TABLET ORAL at 18:16

## 2019-04-25 ASSESSMENT — PAIN SCALES - GENERAL: PAINLEVEL_OUTOF10: 8

## 2019-04-25 ASSESSMENT — PAIN DESCRIPTION - ORIENTATION: ORIENTATION: RIGHT;LEFT

## 2019-04-25 ASSESSMENT — PAIN DESCRIPTION - DESCRIPTORS: DESCRIPTORS: SORE;SHARP;PRESSURE

## 2019-04-25 ASSESSMENT — PAIN DESCRIPTION - PAIN TYPE: TYPE: CHRONIC PAIN

## 2019-04-25 ASSESSMENT — PAIN DESCRIPTION - LOCATION: LOCATION: BACK

## 2019-04-25 NOTE — PROGRESS NOTES
Pulmonary/Critical Care Progress Note    We are following patient for acute kidney injury, renal failure, hyperuricemia, diabetes mellitus, history of CHF, history of chronic back pain    SUBJECTIVE:  Patient was dialyzed yesterday and the day before and has done well. The creatinine is now 3.3. However, the uric acid is elevated at 12. This is concerning for a gouty attack; therefore, we will initiate allopurinol at a low dose, but we will also add a single dose of colchicine to prevent a gout attack being induced by the initiation of allopurinol itself. Her mental status has returned to normal and is no longer any evidence of uremia including asterixis. She is eating full meals and is capable of being transferred to the monitored floor. Further dialysis is not planned for now since she is making good urine. We will need to check whether it is isosthenuric at this point.     MEDICATIONS:   potassium chloride  20 mEq Oral BID WC    darbepoetin jennifer-polysorbate  60 mcg Subcutaneous Q7 Days    [START ON 4/26/2019] pantoprazole  40 mg Oral QAM AC    colchicine  0.6 mg Oral Daily    allopurinol  100 mg Oral Daily    heparin (porcine)  5,000 Units Subcutaneous 3 times per day    folic acid  1 mg Oral Daily    lactobacillus  1 capsule Oral Daily    fluticasone  1 spray Each Nare Daily    ipratropium-albuterol  1 ampule Inhalation 4x daily    aspirin  324 mg Oral Once    gabapentin  100 mg Oral Nightly    insulin glargine  20 Units Subcutaneous Nightly    insulin lispro  0-12 Units Subcutaneous TID WC    insulin lispro  0-6 Units Subcutaneous Nightly    [Held by provider] metoprolol tartrate  25 mg Oral BID      dextrose       perflutren lipid microspheres, HYDROcodone-acetaminophen, acetaminophen, glucose, dextrose, glucagon (rDNA), dextrose, morphine      REVIEW OF SYSTEMS:  Constitutional: Denies fever, weight loss, night sweats, and fatigue  Skin: Denies pigmentation, dark lesions, and rashes HEENT: Denies hearing loss, tinnitus, ear drainage, epistaxis, sore throat, and hoarseness. Cardiovascular: Denies palpitations, chest pain, and chest pressure. Respiratory: Denies cough, dyspnea at rest, hemoptysis, apnea, and choking. Gastrointestinal: Denies nausea, vomiting, poor appetite, diarrhea, heartburn or reflux  Genitourinary: Denies dysuria, frequency, urgency or hematuria  Musculoskeletal: Denies myalgias, muscle weakness, and bone pain  Neurological: Denies dizziness, vertigo, headache, and focal weakness  Psychological: Denies anxiety and depression  Endocrine: Denies heat intolerance and cold intolerance  Hematopoietic/Lymphatic: Denies bleeding problems and blood transfusions    OBJECTIVE:  Vitals:    04/25/19 1200   BP: (!) 179/67   Pulse: 76   Resp: 22   Temp: 98.7 °F (37.1 °C)   SpO2: 97%        O2 Flow Rate (L/min): 2 L/min  O2 Device: None (Room air)    PHYSICAL EXAM:  Constitutional: No fever, sweats, or chills  Skin: No skin rash, no skin break  HEENT: Unremarkable  Neck: No JVD lymphadenopathy or thyromegaly  Cardiovascular: S1, S2 normal. No S3-S4 murmurs or rubs present  Respiratory: There to auscultation bilaterally  Gastrointestinal: Soft, obese, nontender  Genitourinary: No evidence of CVA tenderness  Extremities: No clubbing cyanosis or edema  Neurological: Awake, alert, oriented ×3. No evidence of focal motor or sensory deficits  Psychological: Appropriate affect.     LABS:  WBC   Date Value Ref Range Status   04/25/2019 4.8 4.5 - 11.5 E9/L Final   04/24/2019 4.8 4.5 - 11.5 E9/L Final   04/23/2019 8.5 4.5 - 11.5 E9/L Final     Hemoglobin   Date Value Ref Range Status   04/25/2019 8.5 (L) 11.5 - 15.5 g/dL Final   04/24/2019 8.6 (L) 11.5 - 15.5 g/dL Final   04/24/2019 8.3 (L) 11.5 - 15.5 g/dL Final     Hematocrit   Date Value Ref Range Status   04/25/2019 26.0 (L) 34.0 - 48.0 % Final   04/24/2019 25.7 (L) 34.0 - 48.0 % Final   04/24/2019 24.3 (L) 34.0 - 48.0 % Final     MCV   Date Value Ref Range Status   04/25/2019 97.0 80.0 - 99.9 fL Final   04/24/2019 94.9 80.0 - 99.9 fL Final   04/23/2019 102.8 (H) 80.0 - 99.9 fL Final     Platelets   Date Value Ref Range Status   04/25/2019 122 (L) 130 - 450 E9/L Final   04/24/2019 133 130 - 450 E9/L Final   04/23/2019 173 130 - 450 E9/L Final     Sodium   Date Value Ref Range Status   04/25/2019 140 132 - 146 mmol/L Final   04/24/2019 138 132 - 146 mmol/L Final   04/23/2019 137 132 - 146 mmol/L Final     Potassium   Date Value Ref Range Status   04/25/2019 3.5 3.5 - 5.0 mmol/L Final   04/24/2019 3.7 3.5 - 5.0 mmol/L Final   04/23/2019 5.8 (H) 3.5 - 5.0 mmol/L Final     Potassium reflex Magnesium   Date Value Ref Range Status   09/01/2018 3.3 (L) 3.5 - 5.0 mmol/L Final   08/31/2018 3.4 (L) 3.5 - 5.0 mmol/L Final   08/30/2018 3.5 3.5 - 5.0 mmol/L Final     Chloride   Date Value Ref Range Status   04/25/2019 100 98 - 107 mmol/L Final   04/24/2019 93 (L) 98 - 107 mmol/L Final   04/23/2019 92 (L) 98 - 107 mmol/L Final     CO2   Date Value Ref Range Status   04/25/2019 33 (H) 22 - 29 mmol/L Final   04/24/2019 32 (H) 22 - 29 mmol/L Final   04/23/2019 18 (L) 22 - 29 mmol/L Final     BUN   Date Value Ref Range Status   04/25/2019 23 (H) 6 - 20 mg/dL Final   04/24/2019 54 (H) 6 - 20 mg/dL Final   04/23/2019 111 (H) 6 - 20 mg/dL Final     CREATININE   Date Value Ref Range Status   04/25/2019 3.3 (H) 0.5 - 1.0 mg/dL Final   04/24/2019 6.5 (H) 0.5 - 1.0 mg/dL Final   04/23/2019 11.7 (HH) 0.5 - 1.0 mg/dL Final     Glucose   Date Value Ref Range Status   04/25/2019 82 74 - 99 mg/dL Final   04/24/2019 147 (H) 74 - 99 mg/dL Final   04/23/2019 135 (H) 74 - 99 mg/dL Final   09/22/2011 85 70 - 110 mg/dL Final   01/29/2011 360 (H) 70 - 110 mg/dL Final   01/03/2011 226 (H) 70 - 110 mg/dL Final     Calcium   Date Value Ref Range Status   04/25/2019 8.5 (L) 8.6 - 10.2 mg/dL Final   04/24/2019 7.5 (L) 8.6 - 10.2 mg/dL Final   04/23/2019 8.5 (L) 8.6 - 10.2 mg/dL Final     Total Protein   Date Value Ref Range Status   04/25/2019 5.3 (L) 6.4 - 8.3 g/dL Final   04/24/2019 5.2 (L) 6.4 - 8.3 g/dL Final   04/22/2019 7.0 6.4 - 8.3 g/dL Final     Alb   Date Value Ref Range Status   04/25/2019 2.8 (L) 3.5 - 5.2 g/dL Final   04/24/2019 2.7 (L) 3.5 - 5.2 g/dL Final   04/22/2019 3.8 3.5 - 5.2 g/dL Final     Total Bilirubin   Date Value Ref Range Status   04/25/2019 0.3 0.0 - 1.2 mg/dL Final   04/24/2019 0.3 0.0 - 1.2 mg/dL Final   04/22/2019 0.2 0.0 - 1.2 mg/dL Final     Alkaline Phosphatase   Date Value Ref Range Status   04/25/2019 70 35 - 104 U/L Final   04/24/2019 63 35 - 104 U/L Final   04/22/2019 98 35 - 104 U/L Final     AST   Date Value Ref Range Status   04/25/2019 10 0 - 31 U/L Final   04/24/2019 9 0 - 31 U/L Final   04/22/2019 12 0 - 31 U/L Final     ALT   Date Value Ref Range Status   04/25/2019 8 0 - 32 U/L Final   04/24/2019 8 0 - 32 U/L Final   04/22/2019 13 0 - 32 U/L Final     GFR Non-   Date Value Ref Range Status   04/25/2019 14 >=60 mL/min/1.73 Final     Comment:     Chronic Kidney Disease: less than 60 ml/min/1.73 sq.m. Kidney Failure: less than 15 ml/min/1.73 sq.m. Results valid for patients 18 years and older. 04/24/2019 7 >=60 mL/min/1.73 Final     Comment:     Chronic Kidney Disease: less than 60 ml/min/1.73 sq.m. Kidney Failure: less than 15 ml/min/1.73 sq.m. Results valid for patients 18 years and older. 04/23/2019 3 >=60 mL/min/1.73 Final     Comment:     Chronic Kidney Disease: less than 60 ml/min/1.73 sq.m. Kidney Failure: less than 15 ml/min/1.73 sq.m. Results valid for patients 18 years and older.        GFR    Date Value Ref Range Status   04/25/2019 17  Final   04/24/2019 8  Final   04/23/2019 4  Final     Magnesium   Date Value Ref Range Status   04/25/2019 1.9 1.6 - 2.6 mg/dL Final   04/24/2019 2.1 1.6 - 2.6 mg/dL Final   09/01/2018 1.9 1.6 - 2.6 mg/dL Final     Phosphorus   Date Value Ref Range Status   04/25/2019 2.8 2.5 - 4.5 mg/dL Final   04/24/2019 4.3 2.5 - 4.5 mg/dL Final     Recent Labs     04/24/19  0549   PH 7.559*   PO2 75.3   PCO2 38.9   HCO3 34.0*   BE 10.9*   O2SAT 95.1       RADIOLOGY:  XR CHEST PORTABLE   Final Result   1. Right internal jugular catheter and left-sided PICC line catheter   tip in appropriate position. 2. No acute cardiopulmonary disease. US DUP LOWER EXTREMITIES BILATERAL VENOUS   Final Result   No evidence of bilateral lower extremity deep venous   thrombus from common femoral vein to proximal calf. IR FLUORO GUIDED CVA DEVICE PLMT/REPLACE/REMOVAL   Final Result   1. Successful placement of a temporary dialysis catheter via the right   internal jugular vein. IR ULTRASOUND GUIDANCE VASCULAR ACCESS   Final Result   Ultrasound guidance was provided during placement of a   temporary dialysis catheter via the right internal jugular vein. IR FLUORO GUIDED CVA DEVICE PLMT/REPLACE/REMOVAL   Final Result   Successful placement of a 5 [place] double-lumen [employer]   PICC line using ultrasound guidance via the basilic vein. IR ULTRASOUND GUIDANCE VASCULAR ACCESS   Final Result   Ultrasound guidance was provided during placement of a   PICC line. XR ABDOMEN (KUB) (SINGLE AP VIEW)   Final Result   1. No acute abdominal abnormality. XR CHEST STANDARD (2 VW)   Final Result   No acute cardiopulmonary disease. CT Head WO Contrast   Final Result   1. No acute intracranial hemorrhage or mass effect. 2. Small area of encephalomalacia within the high right parietal lobe. PROBLEM LIST:  Active Problems:    Acute renal failure (ARF) (HCC)  Resolved Problems:    * No resolved hospital problems. *      IMPRESSION:  1. Acute kidney injury, improved on dialysis  2. Chronic back pain  3. History of CHF and hypertension  4. Uremia, resolved  5. Anemia, stable  6. Hyperuricemia    PLAN:  1.  Hold dialysis until we see whether she continues to need it  2. Initiate one dose colchicine daily 0.6 mg and allopurinol 100 mg by mouth daily and observe uric acid in 2-3 days. 3. From the critical care standpoint, the patient is a candidate to transfer to a monitored floor. ATTESTATION:  ICU Staff Physician note of personal involvement in Care  As the attending physician, I certify that I personally reviewed the patients history and personally examined the patient to confirm the physical findings described above,  And that I reviewed the relevant imaging studies and available reports. I also discussed the differential diagnosis and all of the proposed management plans with the patient and individuals accompanying the patient to this visit. They had the opportunity to ask questions about the proposed management plans and to have those questions answered. This patient has a high probability of sudden, clinically significant deterioration, which requires the highest level of physician preparedness to intervene urgently. I managed/supervised life or organ supporting interventions that required frequent physician assessment. I devoted my full attention to the direct care of this patient for the amount of time indicated below. Time I spent with the family or surrogate(s) is included only if the patient was incapable of providing the necessary information or participating in medical decisions - Time devoted to teaching and to any procedures I billed separately is not included.     CRITICAL CARE TIME:  31 minutes    Electronically signed by Lor Kirby MD on 4/25/2019 at 2:20 PM

## 2019-04-25 NOTE — PROGRESS NOTES
Name: Roseline Brunner  : 1960  MRN: [id number]  Room: [place] [address]  DOS: 2019    Internal Medicine Progress Note    PCP: Fred Tsai DO  Admitting Physician: Priyank Gordillo DO    Chief Complaint:    Chief Complaint   Patient presents with    Abdominal Pain     started this am    Nausea    Emesis       Subjective     Xiang Wells was seen and examined at bedside today. Patient alert and mentation has improved. Patient is feeling a lot better. States back pain is better controlled. Denies nausea/emesis.          Hospital Medications  Current Facility-Administered Medications   Medication Dose Route Frequency Provider Last Rate Last Dose    potassium chloride (KLOR-CON M) extended release tablet 20 mEq  20 mEq Oral BID  Sanchez Cabrera MD        darbepoetin jennifer-polysorbate (ARANESP) injection 60 mcg  60 mcg Subcutaneous Q7 Days Sanchez Cabrera MD        [START ON 2019] pantoprazole (PROTONIX) tablet 40 mg  40 mg Oral QAM AC Hernan Elliott MD        heparin (porcine) injection 5,000 Units  5,000 Units Subcutaneous 3 times per day Nik Adams, DO   5,000 Units at  1942    folic acid (FOLVITE) tablet 1 mg  1 mg Oral Daily Nik Adams, DO   1 mg at 19 0920    lactobacillus (CULTURELLE) capsule 1 capsule  1 capsule Oral Daily Orbodette Adams, DO   1 capsule at 19 0920    fluticasone (FLONASE) 50 MCG/ACT nasal spray 1 spray  1 spray Each Nare Daily Nik Adams DO   1 spray at 19 1545    ipratropium-albuterol (DUONEB) nebulizer solution 1 ampule  1 ampule Inhalation 4x daily Hernan Elliott MD   1 ampule at 19 1326    perflutren lipid microspheres (DEFINITY) injection 1.65 mg  1.5 mL Intravenous ONCE PRN Hernan Elliott MD        HYDROcodone-acetaminophen [facility])  MG per tablet 1 tablet  1 tablet Oral Q8H PRN Katalina Sales DO   1 tablet at 19 0920    aspirin chewable tablet 324 mg  324 mg Oral Once Patsy Corona DO  acetaminophen (TYLENOL) tablet 650 mg  650 mg Oral Q6H PRN Geannie Bon, DO        gabapentin (NEURONTIN) capsule 100 mg  100 mg Oral Nightly Geannie Bon, DO   100 mg at 04/24/19 2121    insulin glargine (LANTUS) injection vial 20 Units  20 Units Subcutaneous Nightly Geannie Bon, DO   20 Units at 04/24/19 2153    glucose (GLUTOSE) 40 % oral gel 15 g  15 g Oral PRN Geannie Bon, DO        dextrose 50 % solution 12.5 g  12.5 g Intravenous PRN Geannie Bon, DO        glucagon (rDNA) injection 1 mg  1 mg Intramuscular PRN Geannie Bon, DO        dextrose 5 % solution  100 mL/hr Intravenous PRN Geannie Bon, DO        insulin lispro (HUMALOG) injection vial 0-12 Units  0-12 Units Subcutaneous TID WC Geannie Bon, DO        insulin lispro (HUMALOG) injection vial 0-6 Units  0-6 Units Subcutaneous Nightly Geannie Bon, DO   1 Units at 04/24/19 2153    [Held by provider] metoprolol tartrate (LOPRESSOR) tablet 25 mg  25 mg Oral BID Geannie Bon, DO   25 mg at 04/22/19 2210    morphine (PF) injection 2 mg  2 mg Intravenous Q3H PRN Geannie Bon, DO   2 mg at 04/22/19 2208       PRN Medications  perflutren lipid microspheres, HYDROcodone-acetaminophen, acetaminophen, glucose, dextrose, glucagon (rDNA), dextrose, morphine    Review of Systems: All bolded are positive, all others are negative. General:  Fever, chills, diaphoresis, fatigue, malaise, night sweats, weight loss  Psychological:  Anxiety, disorientation, hallucinations. ENT:  Epistaxis, headaches, vertigo, visual changes. Cardiovascular:  Chest pain, irregular heartbeats, palpitations, paroxysmal nocturnal dyspnea. Respiratory:  Shortness of breath, coughing, sputum production, hemoptysis, wheezing, orthopnea.   Gastrointestinal:  Nausea, vomiting, diarrhea, heartburn, constipation, abdominal pain, hematemesis, hematochezia, melena, acholic stools  Genito-Urinary:  Dysuria, urgency, frequency, hematuria  Musculoskeletal:  Joint pain, joint stiffness, joint swelling, muscle pain  Neurology:  Headache, focal neurological deficits, weakness, numbness, paresthesia  Derm:  Rashes, ulcers, excoriations, bruising  Extremities:  Decreased ROM, peripheral edema, mottling    Physical Exam     Most Recent Recorded Vitals:  BP (!) 179/67   Pulse 76   Temp 98.7 °F (37.1 °C) (Oral)   Resp 22   Ht 5' 8\" (1.727 m)   Wt 235 lb 3.7 oz (106.7 kg)   LMP 10/17/2012   SpO2 97%   BMI 35.77 kg/m²   I/O last 3 completed shifts: In: 1287.5 [P.O.:30; I.V.:557.5]  Out: 8248 [[id number]]  I/O this shift:  In: 240 [P.O.:240]  Out: -     General: Awake, alert and Oriented x 3, NAD  Head: Normocephalic, atraumatic  Eyes: Conjunctivae/corneas clear, Sclera non icteric  Lungs: CTAB. No RRW.   Heart: RRR, - MGR, +S1, S2  Abdomen: Soft, non-tender; bowel sounds normal; no masses, no organomegaly  Extremities: extremities atraumatic, no cyanosis, no clubbing, peripheral pulses equal  Neurologic: aox2, 2-3/5 in lower extremities  [place] catheter in place  Bilateral lower extremity edema  Right ij tlc  Left picc line    Pertinent/New Labs and Imaging Studies     Recent Results (from the past 24 hour(s))   TYPE AND SCREEN    Collection Time: 04/24/19  8:40 PM   Result Value Ref Range    ABO/Rh A POS     Antibody Screen NEG    Hemoglobin and Hematocrit, Blood    Collection Time: 04/24/19  8:40 PM   Result Value Ref Range    Hemoglobin 8.6 (L) 11.5 - 15.5 g/dL    Hematocrit 25.7 (L) 34.0 - 48.0 %   POCT Glucose    Collection Time: 04/24/19  9:40 PM   Result Value Ref Range    Meter Glucose 143 (H) 74 - 99 mg/dL   Comprehensive Metabolic Panel    Collection Time: 04/25/19  5:45 AM   Result Value Ref Range    Sodium 140 132 - 146 mmol/L    Potassium 3.5 3.5 - 5.0 mmol/L    Chloride 100 98 - 107 mmol/L    CO2 33 (H) 22 - 29 mmol/L    Anion Gap 7 7 - 16 mmol/L    Glucose 82 74 - 99 mg/dL    BUN 23 (H) 6 - 20 mg/dL    CREATININE 3.3 (H) 0.5 - 1.0 mg/dL    GFR Non-African American 14 >=60 mL/min/1.73    GFR African American 17     Calcium 8.5 (L) 8.6 - 10.2 mg/dL    Total Protein 5.3 (L) 6.4 - 8.3 g/dL    Alb 2.8 (L) 3.5 - 5.2 g/dL    Total Bilirubin 0.3 0.0 - 1.2 mg/dL    Alkaline Phosphatase 70 35 - 104 U/L    ALT 8 0 - 32 U/L    AST 10 0 - 31 U/L   CBC Auto Differential    Collection Time: 04/25/19  5:45 AM   Result Value Ref Range    WBC 4.8 4.5 - 11.5 E9/L    RBC 2.68 (L) 3.50 - 5.50 E12/L    Hemoglobin 8.5 (L) 11.5 - 15.5 g/dL    Hematocrit 26.0 (L) 34.0 - 48.0 %    MCV 97.0 80.0 - 99.9 fL    MCH 31.7 26.0 - 35.0 pg    MCHC 32.7 32.0 - 34.5 %    RDW 14.0 11.5 - 15.0 fL    Platelets 973 (L) 223 - 450 E9/L    MPV 11.0 7.0 - 12.0 fL    Neutrophils % 54.8 43.0 - 80.0 %    Immature Granulocytes % 0.4 0.0 - 5.0 %    Lymphocytes % 34.4 20.0 - 42.0 %    Monocytes % 6.0 2.0 - 12.0 %    Eosinophils % 3.8 0.0 - 6.0 %    Basophils % 0.6 0.0 - 2.0 %    Neutrophils # 2.63 1.80 - 7.30 E9/L    Immature Granulocytes # 0.02 E9/L    Lymphocytes # 1.65 1.50 - 4.00 E9/L    Monocytes # 0.29 0.10 - 0.95 E9/L    Eosinophils # 0.18 0.05 - 0.50 E9/L    Basophils # 0.03 0.00 - 0.20 E9/L   Phosphorus    Collection Time: 04/25/19  5:45 AM   Result Value Ref Range    Phosphorus 2.8 2.5 - 4.5 mg/dL   Magnesium    Collection Time: 04/25/19  5:45 AM   Result Value Ref Range    Magnesium 1.9 1.6 - 2.6 mg/dL   POCT Glucose    Collection Time: 04/25/19  8:03 AM   Result Value Ref Range    Meter Glucose 76 74 - 99 mg/dL   POCT Glucose    Collection Time: 04/25/19 12:04 PM   Result Value Ref Range    Meter Glucose 135 (H) 74 - 99 mg/dL       Microbiology      Radiology  Xr Chest Standard (2 Vw)    Result Date: 4/22/2019  Reading location[facility] Indication: Hypoxia, shortness of breath. Comparison: Prior chest radiograph from 2/3/2019 Technique: Portable AP upright and lateral chest radiograph obtained. Findings: The cardiomediastinal silhouette is stable in size and contours.  Bilateral lungs and costophrenic angles are clear. There is no evidence of pneumothorax. No acute cardiopulmonary disease. Xr Abdomen (kub) (single Ap View)    Result Date: 4/22/2019  LOCATION: 200 EXAM: XR ABDOMEN (KUB) (SINGLE AP VIEW) COMPARISON: None HISTORY: Abdominal pain TECHNIQUE: Supine view  of the abdomen. FINDINGS: No focally dilated loops or free air is visualized. No free air seen on supine view. No abdominal calcifications present. Lung bases are clear. 1. No acute abdominal abnormality. Ct Head Wo Contrast    Result Date: 4/22/2019  Location: 200 Indication: Altered mental status. Comparison: CT head from 12/7/2012. Technique: Multidetector CT imaging was obtained from the skull base to vertex without the administration of intravenous contrast. Coronal and sagittal reformatted images were obtained. Automated dose exposure control was used for this exam. FINDINGS: Prominence of ventricles and sulci is compatible with age-related volume loss. No extra-axial collection. No acute intracranial hemorrhage. No cerebral edema or mass effect. No CT evidence of acute, large territorial infarction. There is a small area of encephalomalacia within the high right parietal lobe. Visualized paranasal sinuses are well aerated. Visualized mastoid air cells are well aerated. The calvarium is intact. 1. No acute intracranial hemorrhage or mass effect. 2. Small area of encephalomalacia within the high right parietal lobe. Assessment and Plan     Active Hospital Problems    Diagnosis Date Noted    Acute renal failure (ARF) ([facility] [address] [N17.9] 04/22/2019       Assessment  1. Acute renal failure/ATN  2. Hyperkalemia-improved with treatment in the ED  3. Acute metabolic encephalopathy- multifactorial with medication, arf, ?infection  4. Right parietal lobe encephalomalacia  5. Acute hypoxic respiratory failure   6. Acute exacerbation of COPD  7. Acute anemia without overt bleed  8.  Chronic back pain with hx of

## 2019-04-25 NOTE — PROGRESS NOTES
Department of Internal Medicine  Nephrology Attending Progress Note        SUBJECTIVE:  The patient feeling better, urine outputs picking up, H/H dropping? Diarrhea has resolved  Physical Exam:    Vitals:    04/25/19 0900   BP: (!) 154/61   Pulse: 78   Resp: 21   Temp:    SpO2: 97%       I/O last 24 hours:  Intake/Output 1287/2975    Weight: 235    General Appearance:  awake, alert, oriented, in mild distress  Skin:  Dusky toes  Neck:  neck- supple, no mass, non-tender  Lungs:  Distant breath sounds  Heart:  Heart regular rate and rhythm     Abdominal: Abdomen soft, non-tender. BS normal. No masses,  No organomegaly  Extremities: Extremities warm to touch, pink, with no edema.   Peripheral Pulses:  +2    4}    DATA:    CBC with Differential:    Lab Results   Component Value Date    WBC 4.8 04/25/2019    RBC 2.68 04/25/2019    HGB 8.5 04/25/2019    HCT 26.0 04/25/2019     04/25/2019    MCV 97.0 04/25/2019    MCH 31.7 04/25/2019    MCHC 32.7 04/25/2019    RDW 14.0 04/25/2019    SEGSPCT 58 09/26/2013    LYMPHOPCT 34.4 04/25/2019    MONOPCT 6.0 04/25/2019    BASOPCT 0.6 04/25/2019    MONOSABS 0.29 04/25/2019    LYMPHSABS 1.65 04/25/2019    EOSABS 0.18 04/25/2019    BASOSABS 0.03 04/25/2019     BMP:    Lab Results   Component Value Date     04/25/2019    K 3.5 04/25/2019    K 3.3 09/01/2018     04/25/2019    CO2 33 04/25/2019    BUN 23 04/25/2019    LABALBU 2.8 04/25/2019    CREATININE 3.3 04/25/2019    CALCIUM 8.5 04/25/2019    GFRAA 17 04/25/2019    LABGLOM 14 04/25/2019    GLUCOSE 82 04/25/2019    GLUCOSE 85 09/22/2011     Magnesium:    Lab Results   Component Value Date    MG 1.9 04/25/2019     Phosphorus:    Lab Results   Component Value Date    PHOS 2.8 04/25/2019          heparin (porcine)  5,000 Units Subcutaneous 3 times per day    folic acid  1 mg Oral Daily    lactobacillus  1 capsule Oral Daily    fluticasone  1 spray Each Nare Daily    ipratropium-albuterol  1 ampule Inhalation 4x daily    aspirin  324 mg Oral Once    gabapentin  100 mg Oral Nightly    insulin glargine  20 Units Subcutaneous Nightly    insulin lispro  0-12 Units Subcutaneous TID WC    insulin lispro  0-6 Units Subcutaneous Nightly    pantoprazole  40 mg Intravenous Daily    [Held by provider] metoprolol tartrate  25 mg Oral BID      dextrose       perflutren lipid microspheres, HYDROcodone-acetaminophen, acetaminophen, glucose, dextrose, glucagon (rDNA), dextrose, morphine    IMPRESSION/RECOMMENDATIONS:      DOLORES nonoliguric will hold dialysis today to see how pt does  Anemia check iron stores, guiac stools,  begin aranesp, folic acid started for folate deficiency  Acidosis resolved  Hypokalemia will give some supplement   Hyponatremia resolved      Haylee Moss MD  4/25/2019 9:49 AM

## 2019-04-25 NOTE — PLAN OF CARE
Problem: Nutrition Deficit:  Goal: Ability to achieve adequate nutritional intake will improve  Description  Ability to achieve adequate nutritional intake will improve  Outcome: Met This Shift

## 2019-04-25 NOTE — FLOWSHEET NOTE
04/24/19 2350   Vital Signs   BP (!) 143/73   Temp 98.2 °F (36.8 °C)   Pulse 80   Weight 246 lb 11.1 oz (111.9 kg)   Weight Method Bed scale   Percent Weight Change -1.58   Post-Hemodialysis Assessment   Post-Treatment Procedures Blood returned;Catheter capped, clamped with Citrate x 2 ports   Machine Disinfection Process Acid/Vinegar Clean;Exterior Machine Disinfection; Heat Disinfect   Rinseback Volume (ml) 300 ml   Total Liters Processed (l/min) 56.7 l/min   Dialyzer Clearance Moderately streaked   Duration of Treatment (minutes) 210 minutes   Heparin amount administered during treatment (units) 0 units   Hemodialysis Intake (ml) 700 ml   Hemodialysis Output (ml) 1700 ml   NET Removed (ml) 1000 ml   Tolerated Treatment Good   Patient Response to Treatment Completed prescribed 3.5 hour treatment. No complications encountered.

## 2019-04-25 NOTE — PROGRESS NOTES
Physical Therapy  Physical Therapy  Daily Treatment Note  4/25/2019  IC02/IC02-01                      Adan Espinoza   [id number]                              1960    Patient Active Problem List   Diagnosis    COPD (chronic obstructive pulmonary disease) ([facility]    Morbid obesity with BMI of 40.0-44.9, adult ([facility]    Diabetes mellitus type 2, uncontrolled ([facility]    History of DVT (deep vein thrombosis)    Essential hypertension    FÁTIMA (obstructive sleep apnea)    Chronic back pain    CHF (congestive heart failure), NYHA class I, acute on chronic, combined ([facility]    Leg swelling    Acute renal failure (ARF) ([facility]       Recommendation for discharge: Subacute  Equipment prescriptions needed:to be determined    [facility] Mobility Inpatient   How much difficulty turning over in bed?: A Little  How much difficulty sitting down on / standing up from a chair with arms?: A Lot  How much difficulty moving from lying on back to sitting on side of bed?: A Little  How much help from another person moving to and from a bed to a chair?: A Lot  How much help from another person needed to walk in hospital room?: A Lot  How much help from another person for climbing 3-5 steps with a railing?: Total  AM-PAC Inpatient Mobility Raw Score : 13  AM-PAC Inpatient T-Scale Score : 36.74  Mobility Inpatient CMS 0-100% Score: 64.91  Mobility Inpatient CMS G-Code Modifier : CL      Precautions: falls, alarm, behavior, jerky movements/tremors    S: Patient cleared by nursing for treatment. Patient is agreeable to treatment. Pt c/o pain with her entire spine. Pain status: (measured on a visual analog scale with 0=no pain and 10=excruciating pain) no number given/10.    O: Pt was instructed in and performed the following:   Bed Mobility- Supine to sit-Minimal assists x 2     Scooting- Minimal Assist x 2    Sit to supine-Minimal assists x 2   Transfers-sit to stand- Moderate assistance x 2     Gait: Pt ambulated 2 feet x 2 bed<>chair using

## 2019-04-26 VITALS
TEMPERATURE: 99.1 F | RESPIRATION RATE: 18 BRPM | HEIGHT: 68 IN | OXYGEN SATURATION: 97 % | WEIGHT: 240.4 LBS | SYSTOLIC BLOOD PRESSURE: 164 MMHG | DIASTOLIC BLOOD PRESSURE: 70 MMHG | HEART RATE: 88 BPM | BODY MASS INDEX: 36.43 KG/M2

## 2019-04-26 LAB
ALBUMIN SERPL-MCNC: 3.1 G/DL (ref 3.5–5.2)
ALP BLD-CCNC: 79 U/L (ref 35–104)
ALT SERPL-CCNC: 10 U/L (ref 0–32)
ANION GAP SERPL CALCULATED.3IONS-SCNC: 10 MMOL/L (ref 7–16)
AST SERPL-CCNC: 11 U/L (ref 0–31)
BASOPHILS ABSOLUTE: 0.03 E9/L (ref 0–0.2)
BASOPHILS RELATIVE PERCENT: 0.5 % (ref 0–2)
BILIRUB SERPL-MCNC: 0.3 MG/DL (ref 0–1.2)
BUN BLDV-MCNC: 23 MG/DL (ref 6–20)
CALCIUM SERPL-MCNC: 8.4 MG/DL (ref 8.6–10.2)
CHLORIDE BLD-SCNC: 100 MMOL/L (ref 98–107)
CO2: 28 MMOL/L (ref 22–29)
CREAT SERPL-MCNC: 3.1 MG/DL (ref 0.5–1)
EOSINOPHILS ABSOLUTE: 0.22 E9/L (ref 0.05–0.5)
EOSINOPHILS RELATIVE PERCENT: 3.6 % (ref 0–6)
GFR AFRICAN AMERICAN: 19
GFR NON-AFRICAN AMERICAN: 15 ML/MIN/1.73
GLUCOSE BLD-MCNC: 167 MG/DL (ref 74–99)
HCT VFR BLD CALC: 27.1 % (ref 34–48)
HEMOGLOBIN: 8.7 G/DL (ref 11.5–15.5)
IMMATURE GRANULOCYTES #: 0.03 E9/L
IMMATURE GRANULOCYTES %: 0.5 % (ref 0–5)
IRON SATURATION: 22 % (ref 15–50)
IRON: 41 MCG/DL (ref 37–145)
LYMPHOCYTES ABSOLUTE: 1.57 E9/L (ref 1.5–4)
LYMPHOCYTES RELATIVE PERCENT: 25.4 % (ref 20–42)
MAGNESIUM: 1.6 MG/DL (ref 1.6–2.6)
MCH RBC QN AUTO: 31.9 PG (ref 26–35)
MCHC RBC AUTO-ENTMCNC: 32.1 % (ref 32–34.5)
MCV RBC AUTO: 99.3 FL (ref 80–99.9)
METER GLUCOSE: 141 MG/DL (ref 74–99)
METER GLUCOSE: 157 MG/DL (ref 74–99)
METER GLUCOSE: 208 MG/DL (ref 74–99)
MONOCYTES ABSOLUTE: 0.39 E9/L (ref 0.1–0.95)
MONOCYTES RELATIVE PERCENT: 6.3 % (ref 2–12)
NEUTROPHILS ABSOLUTE: 3.95 E9/L (ref 1.8–7.3)
NEUTROPHILS RELATIVE PERCENT: 63.7 % (ref 43–80)
PDW BLD-RTO: 14.2 FL (ref 11.5–15)
PHOSPHORUS: 2.3 MG/DL (ref 2.5–4.5)
PLATELET # BLD: 122 E9/L (ref 130–450)
PMV BLD AUTO: 11.4 FL (ref 7–12)
POTASSIUM SERPL-SCNC: 3.3 MMOL/L (ref 3.5–5)
RBC # BLD: 2.73 E12/L (ref 3.5–5.5)
SODIUM BLD-SCNC: 138 MMOL/L (ref 132–146)
TOTAL IRON BINDING CAPACITY: 190 MCG/DL (ref 250–450)
TOTAL PROTEIN: 5.9 G/DL (ref 6.4–8.3)
WBC # BLD: 6.2 E9/L (ref 4.5–11.5)

## 2019-04-26 PROCEDURE — 83550 IRON BINDING TEST: CPT

## 2019-04-26 PROCEDURE — 6370000000 HC RX 637 (ALT 250 FOR IP): Performed by: INTERNAL MEDICINE

## 2019-04-26 PROCEDURE — 82962 GLUCOSE BLOOD TEST: CPT

## 2019-04-26 PROCEDURE — 2700000000 HC OXYGEN THERAPY PER DAY

## 2019-04-26 PROCEDURE — 97530 THERAPEUTIC ACTIVITIES: CPT

## 2019-04-26 PROCEDURE — 83735 ASSAY OF MAGNESIUM: CPT

## 2019-04-26 PROCEDURE — 94640 AIRWAY INHALATION TREATMENT: CPT

## 2019-04-26 PROCEDURE — 83540 ASSAY OF IRON: CPT

## 2019-04-26 PROCEDURE — 6360000002 HC RX W HCPCS: Performed by: INTERNAL MEDICINE

## 2019-04-26 PROCEDURE — 84100 ASSAY OF PHOSPHORUS: CPT

## 2019-04-26 PROCEDURE — 36592 COLLECT BLOOD FROM PICC: CPT

## 2019-04-26 PROCEDURE — 85025 COMPLETE CBC W/AUTO DIFF WBC: CPT

## 2019-04-26 PROCEDURE — 97110 THERAPEUTIC EXERCISES: CPT

## 2019-04-26 PROCEDURE — 80053 COMPREHEN METABOLIC PANEL: CPT

## 2019-04-26 PROCEDURE — 36415 COLL VENOUS BLD VENIPUNCTURE: CPT

## 2019-04-26 RX ORDER — COLCHICINE 0.6 MG/1
0.6 CAPSULE ORAL DAILY
Status: DISCONTINUED | OUTPATIENT
Start: 2019-04-26 | End: 2019-04-26

## 2019-04-26 RX ORDER — GABAPENTIN 100 MG/1
100 CAPSULE ORAL NIGHTLY
Qty: 30 CAPSULE | Refills: 3
Start: 2019-04-26 | End: 2019-04-26

## 2019-04-26 RX ORDER — LACTOBACILLUS RHAMNOSUS GG 10B CELL
1 CAPSULE ORAL DAILY
COMMUNITY
Start: 2019-04-27

## 2019-04-26 RX ORDER — POTASSIUM CHLORIDE 20 MEQ/1
20 TABLET, EXTENDED RELEASE ORAL 2 TIMES DAILY WITH MEALS
Status: DISCONTINUED | OUTPATIENT
Start: 2019-04-26 | End: 2019-04-26 | Stop reason: HOSPADM

## 2019-04-26 RX ORDER — FOLIC ACID 1 MG/1
1 TABLET ORAL DAILY
Qty: 30 TABLET | Refills: 3 | Status: SHIPPED | OUTPATIENT
Start: 2019-04-27

## 2019-04-26 RX ORDER — POTASSIUM CHLORIDE 20 MEQ/1
20 TABLET, EXTENDED RELEASE ORAL DAILY
Qty: 30 TABLET | Refills: 0
Start: 2019-04-26 | End: 2019-04-26

## 2019-04-26 RX ORDER — GABAPENTIN 100 MG/1
100 CAPSULE ORAL NIGHTLY
Qty: 30 CAPSULE | Refills: 3 | DISCHARGE
Start: 2019-04-26 | End: 2019-09-23

## 2019-04-26 RX ORDER — PANTOPRAZOLE SODIUM 40 MG/1
40 TABLET, DELAYED RELEASE ORAL
Qty: 30 TABLET | Refills: 3 | Status: SHIPPED | OUTPATIENT
Start: 2019-04-27 | End: 2019-09-23 | Stop reason: ALTCHOICE

## 2019-04-26 RX ORDER — INSULIN GLARGINE 100 [IU]/ML
20 INJECTION, SOLUTION SUBCUTANEOUS NIGHTLY
Qty: 1 VIAL | Refills: 3 | Status: SHIPPED | OUTPATIENT
Start: 2019-04-26

## 2019-04-26 RX ORDER — ALLOPURINOL 100 MG/1
100 TABLET ORAL DAILY
Qty: 30 TABLET | Refills: 3 | Status: SHIPPED | OUTPATIENT
Start: 2019-04-27

## 2019-04-26 RX ORDER — POTASSIUM CHLORIDE 750 MG/1
10 TABLET, EXTENDED RELEASE ORAL DAILY
DISCHARGE
Start: 2019-04-26

## 2019-04-26 RX ORDER — IPRATROPIUM BROMIDE AND ALBUTEROL SULFATE 2.5; .5 MG/3ML; MG/3ML
3 SOLUTION RESPIRATORY (INHALATION) 4 TIMES DAILY
Qty: 360 ML | DISCHARGE
Start: 2019-04-26 | End: 2019-09-23 | Stop reason: ALTCHOICE

## 2019-04-26 RX ADMIN — COLCHICINE 0.6 MG: 0.6 CAPSULE ORAL at 11:08

## 2019-04-26 RX ADMIN — HEPARIN SODIUM 5000 UNITS: 5000 INJECTION, SOLUTION INTRAVENOUS; SUBCUTANEOUS at 06:22

## 2019-04-26 RX ADMIN — ALLOPURINOL 100 MG: 100 TABLET ORAL at 07:48

## 2019-04-26 RX ADMIN — Medication 400 MG: at 13:30

## 2019-04-26 RX ADMIN — INSULIN LISPRO 1 UNITS: 100 INJECTION, SOLUTION INTRAVENOUS; SUBCUTANEOUS at 11:36

## 2019-04-26 RX ADMIN — HYDROCODONE BITARTRATE AND ACETAMINOPHEN 1 TABLET: 10; 325 TABLET ORAL at 02:20

## 2019-04-26 RX ADMIN — FOLIC ACID 1 MG: 1 TABLET ORAL at 07:48

## 2019-04-26 RX ADMIN — IPRATROPIUM BROMIDE AND ALBUTEROL SULFATE 1 AMPULE: .5; 3 SOLUTION RESPIRATORY (INHALATION) at 05:39

## 2019-04-26 RX ADMIN — PANTOPRAZOLE SODIUM 40 MG: 40 TABLET, DELAYED RELEASE ORAL at 06:22

## 2019-04-26 RX ADMIN — FLUTICASONE PROPIONATE 1 SPRAY: 50 SPRAY, METERED NASAL at 13:30

## 2019-04-26 RX ADMIN — INSULIN LISPRO 2 UNITS: 100 INJECTION, SOLUTION INTRAVENOUS; SUBCUTANEOUS at 07:49

## 2019-04-26 RX ADMIN — Medication 1 CAPSULE: at 07:48

## 2019-04-26 RX ADMIN — HYDROCODONE BITARTRATE AND ACETAMINOPHEN 1 TABLET: 10; 325 TABLET ORAL at 10:49

## 2019-04-26 ASSESSMENT — PAIN SCALES - GENERAL
PAINLEVEL_OUTOF10: 8
PAINLEVEL_OUTOF10: 0
PAINLEVEL_OUTOF10: 0
PAINLEVEL_OUTOF10: 9
PAINLEVEL_OUTOF10: 7
PAINLEVEL_OUTOF10: 9

## 2019-04-26 NOTE — PROGRESS NOTES
Physical Therapy  Physical Therapy  Daily Treatment Note  4/26/2019  3284/[id number]                      Galilea Gilbert   [id number]                              1960    Patient Active Problem List   Diagnosis    COPD (chronic obstructive pulmonary disease) ([facility]    Morbid obesity with BMI of 40.0-44.9, adult ([facility]    Diabetes mellitus type 2, uncontrolled ([facility]    History of DVT (deep vein thrombosis)    Essential hypertension    FÁTIMA (obstructive sleep apnea)    Chronic back pain    CHF (congestive heart failure), NYHA class I, acute on chronic, combined ([facility]    Leg swelling    Acute renal failure (ARF) ([facility]       Recommendation for discharge: Subacute  Equipment prescriptions needed:to be determined    [facility] Mobility Inpatient   How much difficulty turning over in bed?: A Little  How much difficulty sitting down on / standing up from a chair with arms?: A Lot  How much difficulty moving from lying on back to sitting on side of bed?: A Little  How much help from another person moving to and from a bed to a chair?: A Lot  How much help from another person needed to walk in hospital room?: A Lot  How much help from another person for climbing 3-5 steps with a railing?: Total  AM-PAC Inpatient Mobility Raw Score : 13  AM-PAC Inpatient T-Scale Score : 36.74  Mobility Inpatient CMS 0-100% Score: 64.91  Mobility Inpatient CMS G-Code Modifier : CL      Precautions: falls, alarm, behavior, jerky movements/tremors    S: Patient cleared by nursing for treatment. Patient is agreeable to treatment. Pt c/o pain with her entire spine and headache. Pain status: (measured on a visual analog scale with 0=no pain and 10=excruciating pain) no number given/10.    O: Pt was instructed in and performed the following:   Bed Mobility- Supine to sit-Minimal assists x 2     Scooting- Minimal Assist x 2    Sit to supine-NA   Transfers-sit to stand- Moderate assistance x 2     Gait: Pt ambulated 3 feet to chair using wheeled walker with Minimal/Moderate assist x 2. V/C for sequencing, upright posture, increased MARY/step length, and safety. Steps: NA  Treatment: Pt performed supine ex's: ankle pumps, quad sets, heel slides, SLR, hip abduction, hip adduction x 10 reps. V/c for technique. Pt transferred to EOB, sat on EOB for 2 minutes; transferred to the chair at 9:35 a.m. Sara January Comment: Call light left by patient. Pt in chair at end of tx session, chair alarm activated. A: Pt performed ex's AAROM. Pt needed minimal assist for sitting balance. Pt displayed decreased step length w/ cueing to correct. Pt needed cueing for walker placement. Pt able to take steps to chair today. P: Continue with physical therapy   [address] [place] [address], [facility]  Miranda Landry, PTA   GOALS to be met in 5 days. Bed mobility-  Moderate assist                                               Transfers-Sit to stand-Moderate assist x 2              Gait:  Patient to ambulate 10 feet using wheeled walker with Moderate assist x 2  Increase rom in affected joints by 10%  Increase strength in affected mm groups by 1/3 grade  Increase balance to allow for improvement towards functional goals. Increase endurance to allow for improvement towards functional goals.

## 2019-04-26 NOTE — CARE COORDINATION
Ss note:4/26/2019.3:56 PM  Pt is signing out AMA. SW arranged [facility] transfer to return to [facility] today at 5:00 pm. Facility liaison, nursing aware. ALEXIS left message for pts dtr Ana Sandoval.  ANNA Arellano

## 2019-04-26 NOTE — PROGRESS NOTES
Met with patient today. She is not aware of having CHF. Awaiting results of Echo. I did provide CHF book, Sodium content of foods, and Heart Failure Zones which I reviewed with her. Patient to discuss diagnosis with physician.  States she doesn't know why she is in hospital.

## 2019-04-26 NOTE — PROGRESS NOTES
Name: Manuel Rodriguez  : 1960  MRN: [id number]  Room: [facility]/7123-  DOS: 2019    Internal Medicine Progress Note    PCP: Marleny Freedman DO  Admitting Physician: Sandoval Fairchild DO    Chief Complaint:    Chief Complaint   Patient presents with    Abdominal Pain     started this am    Nausea    Emesis       Subjective     Stacie Large was seen and examined at bedside today. Patient alert and mentation has improved. Patient is feeling a lot better. States back pain is better controlled. Denies nausea/emesis. Appetite is improved and the patient is slowly getting stronger. BUN/creatinine is 23/3.1 and hemoglobin 8.7.         Hospital Medications  Current Facility-Administered Medications   Medication Dose Route Frequency Provider Last Rate Last Dose    Colchicine CAPS 0.6 mg  0.6 mg Oral Daily Elida Young MD   0.6 mg at 19 1108    darbepoetin jennifer-polysorbate (ARANESP) injection 60 mcg  60 mcg Subcutaneous Q7 Days Sandoval Fairchild DO   60 mcg at 19 2050    pantoprazole (PROTONIX) tablet 40 mg  40 mg Oral QAM AC Ludin Purvis DO   40 mg at 19 6820    allopurinol (ZYLOPRIM) tablet 100 mg  100 mg Oral Daily Elida Young MD   100 mg at 19 7864    heparin (porcine) injection 5,000 Units  5,000 Units Subcutaneous 3 times per day Sandoval Fairchild DO   5,000 Units at 54/10/40 9034    folic acid (FOLVITE) tablet 1 mg  1 mg Oral Daily Sandoval Fairchild DO   1 mg at 19 5014    lactobacillus (CULTURELLE) capsule 1 capsule  1 capsule Oral Daily Sandoval Fairchild DO   1 capsule at 19 0748    fluticasone (FLONASE) 50 MCG/ACT nasal spray 1 spray  1 spray Each Nare Daily Sandoval Fairchild DO   1 spray at 19 1609    ipratropium-albuterol (DUONEB) nebulizer solution 1 ampule  1 ampule Inhalation 4x daily Sandoval Fairchild DO   1 ampule at 19 0539    perflutren lipid microspheres (DEFINITY) injection 1.65 mg  1.5 mL Intravenous ONCE PRN Sandoval Fairchild DO  HYDROcodone-acetaminophen (NORCO)  MG per tablet 1 tablet  1 tablet Oral Q8H PRN Aleyda Loop, DO   1 tablet at 04/26/19 1049    aspirin chewable tablet 324 mg  324 mg Oral Once Aleyda Loop, DO        acetaminophen (TYLENOL) tablet 650 mg  650 mg Oral Q6H PRN Aleyda Loop, DO        gabapentin (NEURONTIN) capsule 100 mg  100 mg Oral Nightly Aleyda Loop, DO   100 mg at 04/25/19 2051    insulin glargine (LANTUS) injection vial 20 Units  20 Units Subcutaneous Nightly Aleyda Loop, DO   20 Units at 04/25/19 2051    glucose (GLUTOSE) 40 % oral gel 15 g  15 g Oral PRN Aleyda Loop, DO        dextrose 50 % solution 12.5 g  12.5 g Intravenous PRN Aleyda Loop, DO        glucagon (rDNA) injection 1 mg  1 mg Intramuscular PRN Aleyda Loop, DO        dextrose 5 % solution  100 mL/hr Intravenous PRN Aleyda Loop, DO        insulin lispro (HUMALOG) injection vial 0-12 Units  0-12 Units Subcutaneous TID WC Aleyda Loop, DO   1 Units at 04/26/19 1136    insulin lispro (HUMALOG) injection vial 0-6 Units  0-6 Units Subcutaneous Nightly Aleyda Loop, DO   3 Units at 04/25/19 2051    [Held by provider] metoprolol tartrate (LOPRESSOR) tablet 25 mg  25 mg Oral BID Aleyda Loop, DO   25 mg at 04/22/19 2210    morphine (PF) injection 2 mg  2 mg Intravenous Q3H PRN Aleyda Loop, DO   2 mg at 04/22/19 2208       PRN Medications  perflutren lipid microspheres, HYDROcodone-acetaminophen, acetaminophen, glucose, dextrose, glucagon (rDNA), dextrose, morphine    Review of Systems: All bolded are positive, all others are negative. General:  Fever, chills, diaphoresis, fatigue, malaise, night sweats, weight loss  Psychological:  Anxiety, disorientation, hallucinations. ENT:  Epistaxis, headaches, vertigo, visual changes. Cardiovascular:  Chest pain, irregular heartbeats, palpitations, paroxysmal nocturnal dyspnea.   Respiratory:  Shortness of breath, coughing, sputum 6 - 20 mg/dL    CREATININE 3.1 (H) 0.5 - 1.0 mg/dL    GFR Non-African American 15 >=60 mL/min/1.73    GFR African American 19     Calcium 8.4 (L) 8.6 - 10.2 mg/dL    Total Protein 5.9 (L) 6.4 - 8.3 g/dL    Alb 3.1 (L) 3.5 - 5.2 g/dL    Total Bilirubin 0.3 0.0 - 1.2 mg/dL    Alkaline Phosphatase 79 35 - 104 U/L    ALT 10 0 - 32 U/L    AST 11 0 - 31 U/L   CBC Auto Differential    Collection Time: 04/26/19  4:00 AM   Result Value Ref Range    WBC 6.2 4.5 - 11.5 E9/L    RBC 2.73 (L) 3.50 - 5.50 E12/L    Hemoglobin 8.7 (L) 11.5 - 15.5 g/dL    Hematocrit 27.1 (L) 34.0 - 48.0 %    MCV 99.3 80.0 - 99.9 fL    MCH 31.9 26.0 - 35.0 pg    MCHC 32.1 32.0 - 34.5 %    RDW 14.2 11.5 - 15.0 fL    Platelets 425 (L) 269 - 450 E9/L    MPV 11.4 7.0 - 12.0 fL    Neutrophils % 63.7 43.0 - 80.0 %    Immature Granulocytes % 0.5 0.0 - 5.0 %    Lymphocytes % 25.4 20.0 - 42.0 %    Monocytes % 6.3 2.0 - 12.0 %    Eosinophils % 3.6 0.0 - 6.0 %    Basophils % 0.5 0.0 - 2.0 %    Neutrophils # 3.95 1.80 - 7.30 E9/L    Immature Granulocytes # 0.03 E9/L    Lymphocytes # 1.57 1.50 - 4.00 E9/L    Monocytes # 0.39 0.10 - 0.95 E9/L    Eosinophils # 0.22 0.05 - 0.50 E9/L    Basophils # 0.03 0.00 - 0.20 E9/L   Phosphorus    Collection Time: 04/26/19  4:00 AM   Result Value Ref Range    Phosphorus 2.3 (L) 2.5 - 4.5 mg/dL   Magnesium    Collection Time: 04/26/19  4:00 AM   Result Value Ref Range    Magnesium 1.6 1.6 - 2.6 mg/dL   POCT Glucose    Collection Time: 04/26/19  6:21 AM   Result Value Ref Range    Meter Glucose 157 (H) 74 - 99 mg/dL   POCT Glucose    Collection Time: 04/26/19 10:49 AM   Result Value Ref Range    Meter Glucose 141 (H) 74 - 99 mg/dL       Microbiology      Radiology  Xr Chest Standard (2 Vw)    Result Date: 4/22/2019  Reading location: [facility] Indication: Hypoxia, shortness of breath. Comparison: Prior chest radiograph from 2/3/2019 Technique: Portable AP upright and lateral chest radiograph obtained. Findings:  The cardiomediastinal silhouette is stable in size and contours. Bilateral lungs and costophrenic angles are clear. There is no evidence of pneumothorax. No acute cardiopulmonary disease. Xr Abdomen (kub) (single Ap View)    Result Date: 4/22/2019  LOCATION: 200 EXAM: XR ABDOMEN (KUB) (SINGLE AP VIEW) COMPARISON: None HISTORY: Abdominal pain TECHNIQUE: Supine view  of the abdomen. FINDINGS: No focally dilated loops or free air is visualized. No free air seen on supine view. No abdominal calcifications present. Lung bases are clear. 1. No acute abdominal abnormality. Ct Head Wo Contrast    Result Date: 4/22/2019  Location: 200 Indication: Altered mental status. Comparison: CT head from 12/7/2012. Technique: Multidetector CT imaging was obtained from the skull base to vertex without the administration of intravenous contrast. Coronal and sagittal reformatted images were obtained. Automated dose exposure control was used for this exam. FINDINGS: Prominence of ventricles and sulci is compatible with age-related volume loss. No extra-axial collection. No acute intracranial hemorrhage. No cerebral edema or mass effect. No CT evidence of acute, large territorial infarction. There is a small area of encephalomalacia within the high right parietal lobe. Visualized paranasal sinuses are well aerated. Visualized mastoid air cells are well aerated. The calvarium is intact. 1. No acute intracranial hemorrhage or mass effect. 2. Small area of encephalomalacia within the high right parietal lobe. Assessment and Plan     Active Hospital Problems    Diagnosis Date Noted    Acute renal failure (ARF) ([facility] [N17.9] 04/22/2019       Assessment  1. Acute renal failure/ATN-improved  2. Hyperkalemia-resolved  3. Acute metabolic encephalopathy- multifactorial with medication, arf, infection  4. Right parietal lobe encephalomalacia  5. Acute hypoxic respiratory failure   6. Acute exacerbation of COPD  7.  Acute anemia without overt bleed  8. Chronic back pain with hx of postlaminectomy syndrome  9. gastroenteritis   10. Metabolic acidosis-resolved  11. Insulin-dependent diabetes mellitus type [id number]  12. Hypertension- ~ 150/78  13. Sleep apnea  14. HLD  15. Tobacco abuse         Plan  . Urine output increased. Known dialysis today. Mental status has improved. No diarrhea. No nausea/emesis Pt/ot consulted. Heparin for dvt prophylaxis. See orders for further plan of care.        German Shea D.O.  12:40 PM  4/26/2019

## 2019-04-26 NOTE — PROGRESS NOTES
Received call that pt wanted to sign out ama . Spoke with Octaviano Vargas who said ok to pull temp dialysis cath. ripadmini temp. Dialysis cath removed without difficulty pressure held x15 mins. Pressure dressing applied. No bleeding. Updated Rn to monitor for bleeding.

## 2019-04-26 NOTE — DISCHARGE SUMMARY
Name:  Kimberly Lin  :  1960  MRN:  [id number]  Room:  2241/0005-59  DOS:  2019    [address] [facility]  Internal Medicine  Discharge Summary    PCP:  Maria Alejandra Roldan DO  Admitting Physician:  Stephanie Bolton DO  Consultant(s):  IP CONSULT TO CRITICAL CARE  IP CONSULT TO NEPHROLOGY  IP CONSULT TO INTERNAL MEDICINE  IP CONSULT TO CRITICAL CARE      Admission Date:  2019   Discharge Date:  2019      Admission Diagnoses  Acute renal failure (ARF) ([facility] [address] [N17.9]     Discharge Diagnoses    Active Problems:    Acute renal failure (ARF)     Severe metabolic acidosis secondary to above    Right upper quadrant abdominal pain with nausea and vomiting    Acute metabolic encephalopathy    Acute hypoxic respiratory failure    IDDM Type 2 diabetes     Sleep apnea    Lumbar radiculopathy Bilateral    Hypertension    Diabetic Peripheral neuropathy    COPD with exacerbation    Current tobacco abuse        Brief History of Present Illness and Hospital Course  Kimberly Lin is a 64 y.o. female patient of Maria Alejandra Roldan DO who  has a past medical history of Arthritis, Back pain, Bladder infection, Blood transfusion, Cervical facet syndrome, Chest pain, CHF (congestive heart failure), NYHA class I, acute on chronic, combined ([address] COPD (chronic obstructive pulmonary disease) ([facility] [address] Degenerative disc disease, cervical, Degenerative Osteoarthritis of cervical spine, Degenerative Osteoarthritis of lumbar spine, Diabetes mellitus, insulin dependent (IDDM), uncontrolled ([address] Diabetic Peripheral neuropathy, Facet syndrome, lumbar, FHx: migraine headaches, Fibrocystic breast, Generalized headaches, HIGH CHOLESTEROL, Hypertension, Irritable bowel, Kidney disease, Lumbago, Lumbar radiculopathy Bilateral, Migraines, Muscle weakness, Poor motor control of trunk, Postlaminectomy syndrome, lumbar region, S/P Fusion (Dr. Yulisa Jennings, [place] ), Sinus congestion, Sleep apnea, and Type 2 diabetes mellitus without complication ([facility] [address] who originally had concerns including Abdominal Pain (started this am); Nausea; and Emesis. at presentation on 4/22/2019, and was found to have Acute renal failure (ARF) ([address] [N17.9] after workup. The patient was brought into the emergency room with a history of increased weakness, confusion and upper abdominal pain along with nausea and vomiting. The patient was brought in from [facility] care facility with a history of lower extremity weakness with diabetic neuropathy. Patient probably got sick the day before admission with nausea vomiting and diarrhea. The patient woke up very confused. The patient was very hypoxic at the [facility] care facility. Patient's had decrease appetite for about 3-4 days. In the emergency room is noted the patient was in acute renal failure with severe hyperkalemia. The patient was transitioned to the ICU and consult with nephrology. The chest x-ray and KUB was normal. Patient was treated with IV fluids along with IV calcium, bicarb and dextrose with insulin. The patient was set up for temporary dialysis catheter on 4/23. The BUN/creatinine was 120/11.9 on admission with a CO2 of 11. The metabolic acidosis worsened after admission and another reason why dialysis was performed with the patient having essentially no urine output. Patient's potassium was 7.3 on admission. The hemoglobin A1c is 6.9 on admission. Folic acid was only 3.8 with a WBC 9.4. Hemoglobin 11.5 on admission. Patient's BUN/creatinine improved urine output improved with patient having a BUN/creatinine 23/3.1 on discharge CO2 was 28, liver enzymes are normal with a hemoglobin 8.7 on discharge. Patient's vital signs are stable patient was much more alert and oriented and feeling better. The patient demanded to be discharged to the nursing home and wanted her dialysis catheter and PICC line pulled out. Patient stated that she wanted to go back to nursing home so she could smoke.     Patient was 05/28/2015    PROTIME 10.4 04/23/2019    PROTIME 12.1 05/29/2015    PROTIME 11.1 05/28/2015      Lab Results   Component Value Date    TSH 1.890 08/30/2018     Lab Results   Component Value Date    TRIG 165 (H) 09/26/2013    TRIG 262 (H) 09/17/2012    TRIG 276 (H) 01/05/2011     Lab Results   Component Value Date    HDL 39.0 (A) 09/26/2013    HDL 45.0 09/17/2012    HDL 31.0 (A) 01/05/2011     Lab Results   Component Value Date    LDLCALC 79 09/26/2013    LDLCALC 80 09/17/2012    LDLCALC 65 01/05/2011     Lab Results   Component Value Date    LABA1C 6.9 (H) 04/22/2019       Pertinent Imaging  XR CHEST PORTABLE   Final Result   1. Right internal jugular catheter and left-sided PICC line catheter   tip in appropriate position. 2. No acute cardiopulmonary disease. US DUP LOWER EXTREMITIES BILATERAL VENOUS   Final Result   No evidence of bilateral lower extremity deep venous   thrombus from common femoral vein to proximal calf. IR FLUORO GUIDED CVA DEVICE PLMT/REPLACE/REMOVAL   Final Result   1. Successful placement of a temporary dialysis catheter via the right   internal jugular vein. IR ULTRASOUND GUIDANCE VASCULAR ACCESS   Final Result   Ultrasound guidance was provided during placement of a   temporary dialysis catheter via the right internal jugular vein. IR FLUORO GUIDED CVA DEVICE PLMT/REPLACE/REMOVAL   Final Result   Successful placement of a 5 [place] double-lumen Power   PICC line using ultrasound guidance via the basilic vein. IR ULTRASOUND GUIDANCE VASCULAR ACCESS   Final Result   Ultrasound guidance was provided during placement of a   PICC line. XR ABDOMEN (KUB) (SINGLE AP VIEW)   Final Result   1. No acute abdominal abnormality. XR CHEST STANDARD (2 VW)   Final Result   No acute cardiopulmonary disease. CT Head WO Contrast   Final Result   1. No acute intracranial hemorrhage or mass effect.    2. Small area of encephalomalacia within medications  · epoetin jennifer 71168 UNIT/ML injection  · gabapentin 100 MG capsule  · ipratropium-albuterol 0.5-2.5 (3) MG/3ML Soln nebulizer solution  · magnesium oxide 400 (241.3 Mg) MG Tabs tablet  · potassium chloride 10 MEQ extended release tablet         Follow-up / Instructions  · This patient is instructed to follow-up with her primary care physician within 7 days. · Patient is instructed to follow-up with the consults listed above as directed by them. · she is instructed to resume home medications and take new medications as indicated in the list above. · If the patient has a recurrence of symptoms, she is instructed to go to the ED. Preparing for this patient's discharge, including paperwork, orders, instructions, and meeting with patient required > 30 minutes.     Summer Hoyos DO  4:42 PM  4/26/2019

## 2019-04-26 NOTE — PROGRESS NOTES
[facility] Quality Flow/Interdisciplinary Rounds Progress Note        Quality Flow Rounds held on April 26, 2019    Disciplines Attending:  Bedside Nurse, ,  and Nursing Unit Leadership    Chioma Sanderson was admitted on 4/22/2019 12:48 PM    Anticipated Discharge Date:  Expected Discharge Date: 04/29/19    Disposition:    Darron Score:  Darron Scale Score: 15    Readmission Risk              Risk of Unplanned Readmission:        24           Discussed patient goal for the day, patient clinical progression, and barriers to discharge.   The following Goal(s) of the Day/Commitment(s) have been identified:  Activity progression, Transfer from ICU, HD, PICC, From [facility] wheelchair bound, scott Bernal  April 26, 2019

## 2019-04-26 NOTE — PLAN OF CARE
Problem: Pain:  Goal: Pain level will decrease  Description  Pain level will decrease  Outcome: Met This Shift   Comfortable. Denies need for pain medication. Problem: Falls - Risk of:  Goal: Will remain free from falls  Description  Will remain free from falls  Outcome: Met This Shift     Problem: Falls - Risk of:  Goal: Absence of physical injury  Description  Absence of physical injury  Outcome: Met This Shift   Encouraged to use call light to summon needs. No falls or injury this shift  Problem: Nutrition Deficit:  Goal: Ability to achieve adequate nutritional intake will improve  Description  Ability to achieve adequate nutritional intake will improve  Outcome: Not Met This Shift   No meals served at this time.  However pt states appt has been \"better\"

## 2019-04-26 NOTE — PROGRESS NOTES
PICC line removed from patient's left arm without complication. No bleeding noted. Pressure held. Pressure dressing placed over site. Asked patient to let staff know if she notices any bleeding. Temporary dialysis catheter removed from patient's right neck by dialysis nurse. Dressing clean, dry, intact.

## 2019-04-28 LAB — BLOOD CULTURE, ROUTINE: NORMAL

## 2019-04-29 LAB
6AM URINE: <10 NG/ML
CODEINE, URINE: <20 NG/ML
HYDROCODONE, URINE: 1345 NG/ML
HYDROMORPHONE, URINE: <20 NG/ML
MORPHINE URINE: <20 NG/ML
NORHYDROCODONE, URINE: 571 NG/ML
NOROXYCODONE, URINE: <20 NG/ML
NOROXYMORPHONE, URINE: <20 NG/ML
OXYCODONE, URINE CONFIRMATION: <20 NG/ML
OXYMORPHONE, URINE: <20 NG/ML

## 2019-09-23 ENCOUNTER — HOSPITAL ENCOUNTER (EMERGENCY)
Age: 59
Discharge: LEFT BEFORE DISCHARGE | End: 2019-09-24
Attending: EMERGENCY MEDICINE
Payer: COMMERCIAL

## 2019-09-23 ENCOUNTER — APPOINTMENT (OUTPATIENT)
Dept: MRI IMAGING | Age: 59
End: 2019-09-23
Payer: COMMERCIAL

## 2019-09-23 ENCOUNTER — APPOINTMENT (OUTPATIENT)
Dept: CT IMAGING | Age: 59
End: 2019-09-23
Payer: COMMERCIAL

## 2019-09-23 DIAGNOSIS — M54.50 CHRONIC LOW BACK PAIN WITHOUT SCIATICA, UNSPECIFIED BACK PAIN LATERALITY: Primary | ICD-10-CM

## 2019-09-23 DIAGNOSIS — G89.29 CHRONIC LOW BACK PAIN WITHOUT SCIATICA, UNSPECIFIED BACK PAIN LATERALITY: Primary | ICD-10-CM

## 2019-09-23 LAB
CO2: 29 MMOL/L (ref 22–29)
GFR AFRICAN AMERICAN: >60
GFR NON-AFRICAN AMERICAN: >60 ML/MIN/1.73
GLUCOSE BLD-MCNC: 141 MG/DL (ref 74–99)
POC ANION GAP: 10 MMOL/L (ref 7–16)
POC BUN: 26 MG/DL (ref 8–23)
POC CHLORIDE: 103 MMOL/L (ref 100–108)
POC CREATININE: 0.9 MG/DL (ref 0.5–1)
POC POTASSIUM: 4 MMOL/L (ref 3.5–5)
POC SODIUM: 142 MMOL/L (ref 132–146)

## 2019-09-23 PROCEDURE — 80051 ELECTROLYTE PANEL: CPT

## 2019-09-23 PROCEDURE — 6360000002 HC RX W HCPCS: Performed by: EMERGENCY MEDICINE

## 2019-09-23 PROCEDURE — 6370000000 HC RX 637 (ALT 250 FOR IP): Performed by: EMERGENCY MEDICINE

## 2019-09-23 PROCEDURE — 96375 TX/PRO/DX INJ NEW DRUG ADDON: CPT

## 2019-09-23 PROCEDURE — 82947 ASSAY GLUCOSE BLOOD QUANT: CPT

## 2019-09-23 PROCEDURE — 84520 ASSAY OF UREA NITROGEN: CPT

## 2019-09-23 PROCEDURE — 96374 THER/PROPH/DIAG INJ IV PUSH: CPT

## 2019-09-23 PROCEDURE — 82565 ASSAY OF CREATININE: CPT

## 2019-09-23 PROCEDURE — 72131 CT LUMBAR SPINE W/O DYE: CPT

## 2019-09-23 PROCEDURE — 99284 EMERGENCY DEPT VISIT MOD MDM: CPT

## 2019-09-23 RX ORDER — KETOROLAC TROMETHAMINE 15 MG/ML
15 INJECTION, SOLUTION INTRAMUSCULAR; INTRAVENOUS ONCE
Status: COMPLETED | OUTPATIENT
Start: 2019-09-23 | End: 2019-09-23

## 2019-09-23 RX ORDER — LORAZEPAM 0.5 MG/1
0.5 TABLET ORAL ONCE
Status: COMPLETED | OUTPATIENT
Start: 2019-09-23 | End: 2019-09-23

## 2019-09-23 RX ORDER — FENTANYL 25 UG/H
1 PATCH TRANSDERMAL
COMMUNITY

## 2019-09-23 RX ORDER — MORPHINE SULFATE 10 MG/ML
6 INJECTION, SOLUTION INTRAMUSCULAR; INTRAVENOUS ONCE
Status: COMPLETED | OUTPATIENT
Start: 2019-09-23 | End: 2019-09-23

## 2019-09-23 RX ADMIN — LORAZEPAM 0.5 MG: 0.5 TABLET ORAL at 18:39

## 2019-09-23 RX ADMIN — MORPHINE SULFATE 6 MG: 10 INJECTION INTRAVENOUS at 20:10

## 2019-09-23 RX ADMIN — KETOROLAC TROMETHAMINE 15 MG: 15 INJECTION, SOLUTION INTRAMUSCULAR; INTRAVENOUS at 20:18

## 2019-09-23 ASSESSMENT — ENCOUNTER SYMPTOMS
ABDOMINAL DISTENTION: 0
SORE THROAT: 0
VOMITING: 0
SHORTNESS OF BREATH: 0
EYE DISCHARGE: 0
ABDOMINAL SWELLING: 0
EYE PAIN: 0
EYE REDNESS: 0
COUGH: 0
BOWEL INCONTINENCE: 1
WHEEZING: 0
BACK PAIN: 1
ABDOMINAL PAIN: 0
SINUS PRESSURE: 0
DIARRHEA: 0
NAUSEA: 1

## 2019-09-23 ASSESSMENT — PAIN DESCRIPTION - FREQUENCY: FREQUENCY: INTERMITTENT

## 2019-09-23 ASSESSMENT — PAIN SCALES - GENERAL: PAINLEVEL_OUTOF10: 8

## 2019-09-23 ASSESSMENT — PAIN DESCRIPTION - PAIN TYPE: TYPE: ACUTE PAIN

## 2019-09-23 ASSESSMENT — PAIN DESCRIPTION - LOCATION: LOCATION: BACK

## 2019-09-23 ASSESSMENT — PAIN DESCRIPTION - DESCRIPTORS: DESCRIPTORS: PRESSURE;RADIATING

## 2019-09-24 VITALS
HEIGHT: 68 IN | SYSTOLIC BLOOD PRESSURE: 157 MMHG | DIASTOLIC BLOOD PRESSURE: 97 MMHG | BODY MASS INDEX: 37.89 KG/M2 | HEART RATE: 70 BPM | OXYGEN SATURATION: 98 % | RESPIRATION RATE: 20 BRPM | TEMPERATURE: 98.7 F | WEIGHT: 250 LBS

## 2020-02-10 ENCOUNTER — APPOINTMENT (OUTPATIENT)
Dept: GENERAL RADIOLOGY | Age: 60
End: 2020-02-10
Payer: COMMERCIAL

## 2020-02-10 ENCOUNTER — HOSPITAL ENCOUNTER (EMERGENCY)
Age: 60
Discharge: ANOTHER HOSPITAL | End: 2020-02-10
Attending: EMERGENCY MEDICINE
Payer: COMMERCIAL

## 2020-02-10 VITALS
TEMPERATURE: 97.9 F | BODY MASS INDEX: 36.58 KG/M2 | RESPIRATION RATE: 20 BRPM | HEART RATE: 100 BPM | SYSTOLIC BLOOD PRESSURE: 130 MMHG | OXYGEN SATURATION: 94 % | WEIGHT: 247 LBS | HEIGHT: 69 IN | DIASTOLIC BLOOD PRESSURE: 88 MMHG

## 2020-02-10 LAB
ANION GAP SERPL CALCULATED.3IONS-SCNC: 17 MMOL/L (ref 7–16)
APTT: 29 SEC (ref 24.5–35.1)
BASOPHILS ABSOLUTE: 0.03 E9/L (ref 0–0.2)
BASOPHILS RELATIVE PERCENT: 0.2 % (ref 0–2)
BUN BLDV-MCNC: 24 MG/DL (ref 6–20)
CALCIUM SERPL-MCNC: 9.2 MG/DL (ref 8.6–10.2)
CHLORIDE BLD-SCNC: 97 MMOL/L (ref 98–107)
CO2: 26 MMOL/L (ref 22–29)
CREAT SERPL-MCNC: 1 MG/DL (ref 0.5–1)
EOSINOPHILS ABSOLUTE: 0.01 E9/L (ref 0.05–0.5)
EOSINOPHILS RELATIVE PERCENT: 0.1 % (ref 0–6)
GFR AFRICAN AMERICAN: >60
GFR NON-AFRICAN AMERICAN: 57 ML/MIN/1.73
GLUCOSE BLD-MCNC: 107 MG/DL (ref 74–99)
HCT VFR BLD CALC: 30.1 % (ref 34–48)
HEMOGLOBIN: 9.7 G/DL (ref 11.5–15.5)
IMMATURE GRANULOCYTES #: 0.28 E9/L
IMMATURE GRANULOCYTES %: 1.5 % (ref 0–5)
INR BLD: 1.2
LYMPHOCYTES ABSOLUTE: 1.04 E9/L (ref 1.5–4)
LYMPHOCYTES RELATIVE PERCENT: 5.6 % (ref 20–42)
MCH RBC QN AUTO: 31.2 PG (ref 26–35)
MCHC RBC AUTO-ENTMCNC: 32.2 % (ref 32–34.5)
MCV RBC AUTO: 96.8 FL (ref 80–99.9)
MONOCYTES ABSOLUTE: 1.26 E9/L (ref 0.1–0.95)
MONOCYTES RELATIVE PERCENT: 6.8 % (ref 2–12)
NEUTROPHILS ABSOLUTE: 15.82 E9/L (ref 1.8–7.3)
NEUTROPHILS RELATIVE PERCENT: 85.8 % (ref 43–80)
PDW BLD-RTO: 14.3 FL (ref 11.5–15)
PLATELET # BLD: 270 E9/L (ref 130–450)
PMV BLD AUTO: 10.5 FL (ref 7–12)
POTASSIUM REFLEX MAGNESIUM: 3.9 MMOL/L (ref 3.5–5)
PROTHROMBIN TIME: 13.8 SEC (ref 9.3–12.4)
RBC # BLD: 3.11 E12/L (ref 3.5–5.5)
SODIUM BLD-SCNC: 140 MMOL/L (ref 132–146)
WBC # BLD: 18.4 E9/L (ref 4.5–11.5)

## 2020-02-10 PROCEDURE — 80048 BASIC METABOLIC PNL TOTAL CA: CPT

## 2020-02-10 PROCEDURE — 6360000002 HC RX W HCPCS: Performed by: STUDENT IN AN ORGANIZED HEALTH CARE EDUCATION/TRAINING PROGRAM

## 2020-02-10 PROCEDURE — 85025 COMPLETE CBC W/AUTO DIFF WBC: CPT

## 2020-02-10 PROCEDURE — 96374 THER/PROPH/DIAG INJ IV PUSH: CPT

## 2020-02-10 PROCEDURE — 6360000002 HC RX W HCPCS: Performed by: EMERGENCY MEDICINE

## 2020-02-10 PROCEDURE — 99285 EMERGENCY DEPT VISIT HI MDM: CPT

## 2020-02-10 PROCEDURE — 85730 THROMBOPLASTIN TIME PARTIAL: CPT

## 2020-02-10 PROCEDURE — 73502 X-RAY EXAM HIP UNI 2-3 VIEWS: CPT

## 2020-02-10 PROCEDURE — 96375 TX/PRO/DX INJ NEW DRUG ADDON: CPT

## 2020-02-10 PROCEDURE — 85610 PROTHROMBIN TIME: CPT

## 2020-02-10 PROCEDURE — 36415 COLL VENOUS BLD VENIPUNCTURE: CPT

## 2020-02-10 RX ORDER — HEPARIN SODIUM 10000 [USP'U]/100ML
18 INJECTION, SOLUTION INTRAVENOUS CONTINUOUS
Status: DISCONTINUED | OUTPATIENT
Start: 2020-02-10 | End: 2020-02-10 | Stop reason: HOSPADM

## 2020-02-10 RX ORDER — HEPARIN SODIUM 1000 [USP'U]/ML
80 INJECTION, SOLUTION INTRAVENOUS; SUBCUTANEOUS PRN
Status: DISCONTINUED | OUTPATIENT
Start: 2020-02-10 | End: 2020-02-10 | Stop reason: HOSPADM

## 2020-02-10 RX ORDER — HEPARIN SODIUM 1000 [USP'U]/ML
40 INJECTION, SOLUTION INTRAVENOUS; SUBCUTANEOUS PRN
Status: DISCONTINUED | OUTPATIENT
Start: 2020-02-10 | End: 2020-02-10 | Stop reason: HOSPADM

## 2020-02-10 RX ORDER — MORPHINE SULFATE 10 MG/ML
8 INJECTION, SOLUTION INTRAMUSCULAR; INTRAVENOUS ONCE
Status: COMPLETED | OUTPATIENT
Start: 2020-02-10 | End: 2020-02-10

## 2020-02-10 RX ORDER — FENTANYL CITRATE 50 UG/ML
50 INJECTION, SOLUTION INTRAMUSCULAR; INTRAVENOUS ONCE
Status: COMPLETED | OUTPATIENT
Start: 2020-02-10 | End: 2020-02-10

## 2020-02-10 RX ORDER — HEPARIN SODIUM 1000 [USP'U]/ML
80 INJECTION, SOLUTION INTRAVENOUS; SUBCUTANEOUS ONCE
Status: COMPLETED | OUTPATIENT
Start: 2020-02-10 | End: 2020-02-10

## 2020-02-10 RX ADMIN — FENTANYL CITRATE 50 MCG: 50 INJECTION, SOLUTION INTRAMUSCULAR; INTRAVENOUS at 07:42

## 2020-02-10 RX ADMIN — HEPARIN SODIUM 8960 UNITS: 1000 INJECTION INTRAVENOUS; SUBCUTANEOUS at 12:44

## 2020-02-10 RX ADMIN — MORPHINE SULFATE 8 MG: 10 INJECTION INTRAVENOUS at 08:30

## 2020-02-10 RX ADMIN — HEPARIN SODIUM 18 UNITS/KG/HR: 10000 INJECTION, SOLUTION INTRAVENOUS at 12:45

## 2020-02-10 ASSESSMENT — ENCOUNTER SYMPTOMS
VOMITING: 0
DIARRHEA: 0
WHEEZING: 0
NAUSEA: 0
SORE THROAT: 0
SHORTNESS OF BREATH: 0
ABDOMINAL PAIN: 0
COUGH: 0

## 2020-02-10 ASSESSMENT — PAIN DESCRIPTION - PAIN TYPE: TYPE: ACUTE PAIN

## 2020-02-10 ASSESSMENT — PAIN DESCRIPTION - DESCRIPTORS: DESCRIPTORS: SHARP

## 2020-02-10 ASSESSMENT — PAIN DESCRIPTION - FREQUENCY: FREQUENCY: CONTINUOUS

## 2020-02-10 ASSESSMENT — PAIN DESCRIPTION - LOCATION: LOCATION: HIP

## 2020-02-10 ASSESSMENT — PAIN DESCRIPTION - ORIENTATION: ORIENTATION: RIGHT

## 2020-02-10 ASSESSMENT — PAIN SCALES - GENERAL: PAINLEVEL_OUTOF10: 10

## 2020-02-10 NOTE — ED PROVIDER NOTES
The patient is a 62-year-old female presenting with hip pain. The pain is located in the right side. She has significant past medical history of obesity, diabetes, DVT, COPD, CHF. She states she got out of bed this morning and fell onto her right hip just prior to arrival.  Her pain is located in the lateral aspect of her right hip. Pain is made worse with passive and active range of motion of her right hip. States she tried oxycodone at home for her pain for which she was prescribed 2 days ago for the pain without any relief. She denies any alleviating factors. The patient reports that she is to have a fem-pop to be done to her right lower extremity this Friday. Hip Pain   The current episode started less than 1 hour ago. The problem occurs constantly. The problem has not changed since onset. Pertinent negatives include no chest pain, no abdominal pain, no headaches and no shortness of breath. Review of Systems   Constitutional: Negative for chills and fever. HENT: Negative for congestion and sore throat. Eyes: Negative for visual disturbance. Respiratory: Negative for cough, shortness of breath and wheezing. Cardiovascular: Negative for chest pain and palpitations. Gastrointestinal: Negative for abdominal pain, diarrhea, nausea and vomiting. Genitourinary: Negative for dysuria, frequency and hematuria. Musculoskeletal: Positive for arthralgias (Right hip pain). Skin: Negative. Neurological: Negative for dizziness, weakness, light-headedness and headaches. Hematological: Negative. Psychiatric/Behavioral: Negative. Physical Exam  Vitals signs and nursing note reviewed. Constitutional:       Appearance: She is well-developed. Comments: Lying on her left side, rolling back and forth trying to find a comfortable position   HENT:      Head: Normocephalic and atraumatic. Nose: Nose normal.      Mouth/Throat:      Pharynx: Oropharynx is clear.    Eyes: have been reviewed. Allergies: Niacin and related and Penicillins    -------------------------------------------------- RESULTS -------------------------------------------------    Lab  Results for orders placed or performed during the hospital encounter of 02/10/20   CBC Auto Differential   Result Value Ref Range    WBC 18.4 (H) 4.5 - 11.5 E9/L    RBC 3.11 (L) 3.50 - 5.50 E12/L    Hemoglobin 9.7 (L) 11.5 - 15.5 g/dL    Hematocrit 30.1 (L) 34.0 - 48.0 %    MCV 96.8 80.0 - 99.9 fL    MCH 31.2 26.0 - 35.0 pg    MCHC 32.2 32.0 - 34.5 %    RDW 14.3 11.5 - 15.0 fL    Platelets 976 074 - 406 E9/L    MPV 10.5 7.0 - 12.0 fL    Neutrophils % 85.8 (H) 43.0 - 80.0 %    Immature Granulocytes % 1.5 0.0 - 5.0 %    Lymphocytes % 5.6 (L) 20.0 - 42.0 %    Monocytes % 6.8 2.0 - 12.0 %    Eosinophils % 0.1 0.0 - 6.0 %    Basophils % 0.2 0.0 - 2.0 %    Neutrophils Absolute 15.82 (H) 1.80 - 7.30 E9/L    Immature Granulocytes # 0.28 E9/L    Lymphocytes Absolute 1.04 (L) 1.50 - 4.00 E9/L    Monocytes Absolute 1.26 (H) 0.10 - 0.95 E9/L    Eosinophils Absolute 0.01 (L) 0.05 - 0.50 E9/L    Basophils Absolute 0.03 0.00 - 0.20 O3/S   Basic Metabolic Panel w/ Reflex to MG   Result Value Ref Range    Sodium 140 132 - 146 mmol/L    Potassium reflex Magnesium 3.9 3.5 - 5.0 mmol/L    Chloride 97 (L) 98 - 107 mmol/L    CO2 26 22 - 29 mmol/L    Anion Gap 17 (H) 7 - 16 mmol/L    Glucose 107 (H) 74 - 99 mg/dL    BUN 24 (H) 6 - 20 mg/dL    CREATININE 1.0 0.5 - 1.0 mg/dL    GFR Non-African American 57 >=60 mL/min/1.73    GFR African American >60     Calcium 9.2 8.6 - 10.2 mg/dL   Protime-INR   Result Value Ref Range    Protime 13.8 (H) 9.3 - 12.4 sec    INR 1.2    APTT   Result Value Ref Range    aPTT 29.0 24.5 - 35.1 sec       Radiology  XR HIP 2-3 VW W PELVIS RIGHT   Final Result   No significant abnormalities.                    ------------------------- NURSING NOTES AND VITALS REVIEWED ---------------------------  Date / Time Roomed:  2/10/2020

## 2020-11-03 PROBLEM — J44.9 COPD (CHRONIC OBSTRUCTIVE PULMONARY DISEASE) (HCC): Status: RESOLVED | Noted: 2020-11-03 | Resolved: 2020-11-03

## 2020-11-03 PROBLEM — I10 ESSENTIAL HYPERTENSION: Status: RESOLVED | Noted: 2018-06-26 | Resolved: 2020-11-03
